# Patient Record
Sex: MALE | Race: BLACK OR AFRICAN AMERICAN | Employment: FULL TIME | URBAN - METROPOLITAN AREA
[De-identification: names, ages, dates, MRNs, and addresses within clinical notes are randomized per-mention and may not be internally consistent; named-entity substitution may affect disease eponyms.]

---

## 2017-02-01 ENCOUNTER — TELEPHONE (OUTPATIENT)
Dept: INTERNAL MEDICINE CLINIC | Facility: CLINIC | Age: 62
End: 2017-02-01

## 2017-03-16 ENCOUNTER — TELEPHONE (OUTPATIENT)
Dept: INTERNAL MEDICINE CLINIC | Facility: CLINIC | Age: 62
End: 2017-03-16

## 2017-03-16 NOTE — TELEPHONE ENCOUNTER
If he is still a pt here as he states he needs to make a fasting appt soon.    If he does not , he will be DC from the practice

## 2017-05-25 ENCOUNTER — LAB ENCOUNTER (OUTPATIENT)
Dept: LAB | Age: 62
End: 2017-05-25
Attending: FAMILY MEDICINE
Payer: COMMERCIAL

## 2017-05-25 ENCOUNTER — OFFICE VISIT (OUTPATIENT)
Dept: INTERNAL MEDICINE CLINIC | Facility: CLINIC | Age: 62
End: 2017-05-25

## 2017-05-25 VITALS
DIASTOLIC BLOOD PRESSURE: 90 MMHG | OXYGEN SATURATION: 96 % | BODY MASS INDEX: 50.62 KG/M2 | HEART RATE: 69 BPM | SYSTOLIC BLOOD PRESSURE: 148 MMHG | HEIGHT: 66 IN | WEIGHT: 315 LBS | TEMPERATURE: 99 F

## 2017-05-25 DIAGNOSIS — Z00.00 LABORATORY EXAMINATION ORDERED AS PART OF A ROUTINE GENERAL MEDICAL EXAMINATION: ICD-10-CM

## 2017-05-25 DIAGNOSIS — Z23 NEED FOR PROPHYLACTIC VACCINATION WITH COMBINED DIPHTHERIA-TETANUS-PERTUSSIS (DTP) VACCINE: ICD-10-CM

## 2017-05-25 DIAGNOSIS — L91.8 MULTIPLE ACQUIRED SKIN TAGS: ICD-10-CM

## 2017-05-25 DIAGNOSIS — R23.4 SKIN TEXTURE CHANGES: ICD-10-CM

## 2017-05-25 DIAGNOSIS — Z12.11 ENCOUNTER FOR SCREENING COLONOSCOPY: ICD-10-CM

## 2017-05-25 DIAGNOSIS — Z00.00 ROUTINE GENERAL MEDICAL EXAMINATION AT A HEALTH CARE FACILITY: Primary | ICD-10-CM

## 2017-05-25 DIAGNOSIS — M10.9 GOUTY ARTHROPATHY: ICD-10-CM

## 2017-05-25 PROCEDURE — 85025 COMPLETE CBC W/AUTO DIFF WBC: CPT

## 2017-05-25 PROCEDURE — 80053 COMPREHEN METABOLIC PANEL: CPT

## 2017-05-25 PROCEDURE — 80061 LIPID PANEL: CPT

## 2017-05-25 PROCEDURE — 84443 ASSAY THYROID STIM HORMONE: CPT

## 2017-05-25 PROCEDURE — 82306 VITAMIN D 25 HYDROXY: CPT

## 2017-05-25 PROCEDURE — 99386 PREV VISIT NEW AGE 40-64: CPT | Performed by: FAMILY MEDICINE

## 2017-05-25 PROCEDURE — 90471 IMMUNIZATION ADMIN: CPT | Performed by: FAMILY MEDICINE

## 2017-05-25 PROCEDURE — 90715 TDAP VACCINE 7 YRS/> IM: CPT | Performed by: FAMILY MEDICINE

## 2017-05-25 PROCEDURE — 84153 ASSAY OF PSA TOTAL: CPT

## 2017-05-25 PROCEDURE — 36415 COLL VENOUS BLD VENIPUNCTURE: CPT

## 2017-05-25 PROCEDURE — 83036 HEMOGLOBIN GLYCOSYLATED A1C: CPT

## 2017-05-25 PROCEDURE — 99203 OFFICE O/P NEW LOW 30 MIN: CPT | Performed by: FAMILY MEDICINE

## 2017-05-25 RX ORDER — CARVEDILOL 25 MG/1
25 TABLET ORAL 2 TIMES DAILY WITH MEALS
Qty: 180 TABLET | Refills: 0 | Status: SHIPPED | OUTPATIENT
Start: 2017-05-25 | End: 2017-07-24

## 2017-05-25 RX ORDER — ALLOPURINOL 100 MG/1
100 TABLET ORAL 2 TIMES DAILY
Qty: 180 TABLET | Refills: 1 | Status: SHIPPED | OUTPATIENT
Start: 2017-05-25 | End: 2018-08-13

## 2017-05-25 RX ORDER — CARVEDILOL 25 MG/1
25 TABLET ORAL 2 TIMES DAILY WITH MEALS
Qty: 60 TABLET | Refills: 1 | Status: SHIPPED | OUTPATIENT
Start: 2017-05-25 | End: 2017-05-25 | Stop reason: CLARIF

## 2017-05-25 NOTE — PROGRESS NOTES
Sayra Nathan is a 63year old male who presents for a complete physical exam.   HPI:   Pt complains of almazan gain since having both his replaced in the past 3 years. Pt has been seeing a doctor [facility] by where he lives now but does not like him.  We 01/09/2012       Lab Results  Component Value Date   AST 36* 10/30/2013   AST 22 06/18/2013   AST 22 10/01/2012       Lab Results  Component Value Date   ALT 54* 10/30/2013   ALT 27 06/18/2013   ALT 30 10/01/2012     No results found for: PSA       Current headaches  PSYCHE: denies depression or anxiety  HEMATOLOGIC: denies hx of anemia  ENDOCRINE: denies thyroid history,+DM  ALL/ASTHMA: denies hx of allergy or asthma    EXAM:   /90 mmHg  Pulse 69  Temp(Src) 98.6 °F (37 °C) (Oral)  Ht 66\"  Wt 327 lb 1 examination at a health care facility  (primary encounter diagnosis)  Need for prophylactic vaccination with combined diphtheria-tetanus-pertussis (dtp) vaccine  Encounter for screening colonoscopy  Multiple acquired skin tags  Skin texture changes  Gouty

## 2017-06-07 ENCOUNTER — TELEPHONE (OUTPATIENT)
Dept: INTERNAL MEDICINE CLINIC | Facility: CLINIC | Age: 62
End: 2017-06-07

## 2017-06-07 DIAGNOSIS — E55.9 VITAMIN D DEFICIENCY: ICD-10-CM

## 2017-06-07 DIAGNOSIS — E13.8 DIABETES MELLITUS OF OTHER TYPE WITH COMPLICATION: ICD-10-CM

## 2017-06-07 DIAGNOSIS — R74.8 ABNORMAL LIVER ENZYMES: Primary | ICD-10-CM

## 2017-06-07 RX ORDER — ERGOCALCIFEROL 1.25 MG/1
50000 CAPSULE ORAL WEEKLY
Qty: 12 CAPSULE | Refills: 1 | Status: SHIPPED | OUTPATIENT
Start: 2017-06-07 | End: 2018-02-20 | Stop reason: ALTCHOICE

## 2017-06-07 NOTE — TELEPHONE ENCOUNTER
----- Message from Radha Smith NP sent at 5/25/2017  5:11 PM CDT -----  Glucose up 179, Hgba1c pending. Alk phos, AST and ALT up. Pt needs to watch the tylenol, motrin and alcohol in his diet. Loosing weight would also help this.  Recheck these labs in on

## 2017-06-08 ENCOUNTER — TELEPHONE (OUTPATIENT)
Dept: INTERNAL MEDICINE CLINIC | Facility: CLINIC | Age: 62
End: 2017-06-08

## 2017-06-08 RX ORDER — LANCETS 33 GAUGE
1 EACH MISCELLANEOUS DAILY
Qty: 100 BOX | Refills: 1 | Status: SHIPPED | OUTPATIENT
Start: 2017-06-08 | End: 2018-06-08

## 2017-06-21 ENCOUNTER — OFFICE VISIT (OUTPATIENT)
Dept: INTERNAL MEDICINE CLINIC | Facility: CLINIC | Age: 62
End: 2017-06-21

## 2017-06-21 VITALS
DIASTOLIC BLOOD PRESSURE: 94 MMHG | OXYGEN SATURATION: 98 % | SYSTOLIC BLOOD PRESSURE: 150 MMHG | BODY MASS INDEX: 52 KG/M2 | WEIGHT: 315 LBS | HEART RATE: 65 BPM | TEMPERATURE: 99 F

## 2017-06-21 DIAGNOSIS — I10 ESSENTIAL HYPERTENSION: Primary | ICD-10-CM

## 2017-06-21 PROCEDURE — 99213 OFFICE O/P EST LOW 20 MIN: CPT | Performed by: FAMILY MEDICINE

## 2017-06-21 RX ORDER — VALSARTAN AND HYDROCHLOROTHIAZIDE 320; 12.5 MG/1; MG/1
1 TABLET, FILM COATED ORAL DAILY
Qty: 30 TABLET | Refills: 1 | Status: SHIPPED | OUTPATIENT
Start: 2017-06-21 | End: 2017-07-24

## 2017-06-21 NOTE — PROGRESS NOTES
CHIEF COMPLAINT:     Patient presents with:  Blood Pressure: Follow up. HPI:   Jamila Cruz is a 63year old male   Patient presents for recheck of his hypertension.  Pt has been taking medications as instructed, no medication side effects, home B Denies blurred vision or double vision  HENT: Denies congestion, rhinorrhea, sore throat or ear pain  CHEST: Denies chest pain, or palpitations  LUNGS: Denies shortness of breath, cough, or wheezing  NEURO: Denies headaches or lightheadedness      EXAM:

## 2017-07-12 ENCOUNTER — OFFICE VISIT (OUTPATIENT)
Dept: DERMATOLOGY CLINIC | Facility: CLINIC | Age: 62
End: 2017-07-12

## 2017-07-12 DIAGNOSIS — L98.9 PAINFUL SKIN LESION: ICD-10-CM

## 2017-07-12 DIAGNOSIS — L91.8 INFLAMED ACROCHORDON: ICD-10-CM

## 2017-07-12 DIAGNOSIS — D23.4 BENIGN NEOPLASM OF SCALP AND SKIN OF NECK: Primary | ICD-10-CM

## 2017-07-12 DIAGNOSIS — D23.9 BENIGN NEOPLASM OF SKIN, UNSPECIFIED LOCATION: ICD-10-CM

## 2017-07-12 PROCEDURE — 11200 RMVL SKIN TAGS UP TO&INC 15: CPT | Performed by: DERMATOLOGY

## 2017-07-12 PROCEDURE — 99201 OFFICE/OUTPT VISIT,NEW,LEVL I: CPT | Performed by: DERMATOLOGY

## 2017-07-12 RX ORDER — BLOOD-GLUCOSE METER
KIT MISCELLANEOUS
Refills: 0 | COMMUNITY
Start: 2017-06-09

## 2017-07-24 ENCOUNTER — OFFICE VISIT (OUTPATIENT)
Dept: INTERNAL MEDICINE CLINIC | Facility: CLINIC | Age: 62
End: 2017-07-24

## 2017-07-24 VITALS
WEIGHT: 315 LBS | OXYGEN SATURATION: 96 % | DIASTOLIC BLOOD PRESSURE: 78 MMHG | SYSTOLIC BLOOD PRESSURE: 138 MMHG | TEMPERATURE: 99 F | RESPIRATION RATE: 16 BRPM | HEART RATE: 70 BPM | BODY MASS INDEX: 51 KG/M2

## 2017-07-24 DIAGNOSIS — I10 ESSENTIAL HYPERTENSION: Primary | ICD-10-CM

## 2017-07-24 DIAGNOSIS — E11.9 DIABETES MELLITUS WITHOUT COMPLICATION (HCC): ICD-10-CM

## 2017-07-24 PROCEDURE — 99214 OFFICE O/P EST MOD 30 MIN: CPT | Performed by: FAMILY MEDICINE

## 2017-07-24 RX ORDER — CARVEDILOL 25 MG/1
25 TABLET ORAL 2 TIMES DAILY WITH MEALS
Qty: 180 TABLET | Refills: 0 | Status: SHIPPED | OUTPATIENT
Start: 2017-07-24 | End: 2018-02-20

## 2017-07-24 RX ORDER — VALSARTAN AND HYDROCHLOROTHIAZIDE 320; 12.5 MG/1; MG/1
1 TABLET, FILM COATED ORAL DAILY
Qty: 90 TABLET | Refills: 1 | Status: SHIPPED | OUTPATIENT
Start: 2017-07-24 | End: 2018-02-20

## 2017-07-24 NOTE — PROGRESS NOTES
CHIEF COMPLAINT:     Patient presents with:  HTN: Follow up      HPI:   Cristobal Samson is a 63year old male   Patient presents for recheck of his hypertension.  Pt has been taking medications as instructed, no medication side effects, home BP monitoring medical history on file. Social History:  Smoking status: Never Smoker                                                              Smokeless tobacco: Never Used                      Alcohol use:  No                 REVIEW OF SYSTEMS:   GENERAL: Denies fe pressure at home - < 135/85. Diabetes- DEC ordered  The patient indicates understanding of these issues and agrees to the plan. The patient is asked to return in 1-1.5 months.

## 2017-07-30 NOTE — PROGRESS NOTES
Pj Neil is a 63year old male. Patient presents with:  Lesion: New pt here for skin tags at neck. Review of patient's allergies indicates no known allergies.     Current Outpatient Prescriptions:  ONETOUCH DELICA LANCETS 07K Does 100 strip Rfl: 1   ergocalciferol 12152 units Oral Cap Take 1 capsule (50,000 Units total) by mouth once a week. Disp: 12 capsule Rfl: 1   MetFORMIN HCl 500 MG Oral Tab Take 1 tablet (500 mg total) by mouth 2 (two) times daily.  Disp: 60 tablet Rfl: 3   all lateral anterior neck. Caught on clothing painful. Increasing in number and size    Patient presents with concerns above. ROS:    Denies any other systemic complaints. No fevers, chills, night sweats, sensitivity to the sun, deeper lumps or bumps. unspecified location  Painful skin lesion    No orders of the defined types were placed in this encounter. Results From Past 48 Hours:  No results found for this or any previous visit (from the past 48 hour(s)).     Meds This Visit:      Imaging Orders

## 2017-09-05 ENCOUNTER — OFFICE VISIT (OUTPATIENT)
Dept: INTERNAL MEDICINE CLINIC | Facility: CLINIC | Age: 62
End: 2017-09-05

## 2017-09-05 VITALS
TEMPERATURE: 98 F | DIASTOLIC BLOOD PRESSURE: 90 MMHG | OXYGEN SATURATION: 97 % | HEART RATE: 62 BPM | WEIGHT: 315 LBS | BODY MASS INDEX: 52 KG/M2 | SYSTOLIC BLOOD PRESSURE: 138 MMHG

## 2017-09-05 DIAGNOSIS — I10 ESSENTIAL HYPERTENSION: Primary | ICD-10-CM

## 2017-09-05 DIAGNOSIS — M10.9 GOUTY ARTHROPATHY: ICD-10-CM

## 2017-09-05 PROCEDURE — 99214 OFFICE O/P EST MOD 30 MIN: CPT | Performed by: FAMILY MEDICINE

## 2017-09-05 RX ORDER — INDOMETHACIN 75 MG/1
75 CAPSULE, EXTENDED RELEASE ORAL 2 TIMES DAILY WITH MEALS
Qty: 180 CAPSULE | Refills: 1 | Status: SHIPPED | OUTPATIENT
Start: 2017-09-05 | End: 2018-02-20

## 2017-09-05 RX ORDER — COLCHICINE 0.6 MG/1
0.6 TABLET ORAL DAILY
Qty: 90 TABLET | Refills: 1 | Status: SHIPPED | OUTPATIENT
Start: 2017-09-05 | End: 2018-02-20

## 2017-09-05 NOTE — PROGRESS NOTES
CHIEF COMPLAINT:     Patient presents with:  Blood Pressure: Follow up. HPI:   Mason Macdonald is a 63year old male   Patient presents for recheck of his hypertension.  Pt has been taking medications as instructed, no medication side effects, home B 1200 MG Oral Cap Take 1 tablet by mouth daily. Disp:  Rfl:    allopurinol 100 MG Oral Tab Take 1 tablet (100 mg total) by mouth 2 (two) times daily. Disp: 180 tablet Rfl: 1      History reviewed. No pertinent past medical history.    Social History:  Smok meals.           Imaging & Consults:  None   1. Gouty arthropathy    - colchicine (COLCRYS) 0.6 MG Oral Tab; Take 1 tablet (0.6 mg total) by mouth daily. Dispense: 90 tablet; Refill: 1  - Indomethacin ER 75 MG Oral Cap CR;  Take 1 capsule (75 mg total) by

## 2017-09-06 ENCOUNTER — OFFICE VISIT (OUTPATIENT)
Dept: GASTROENTEROLOGY | Facility: CLINIC | Age: 62
End: 2017-09-06

## 2017-09-06 ENCOUNTER — TELEPHONE (OUTPATIENT)
Dept: GASTROENTEROLOGY | Facility: CLINIC | Age: 62
End: 2017-09-06

## 2017-09-06 VITALS
SYSTOLIC BLOOD PRESSURE: 152 MMHG | DIASTOLIC BLOOD PRESSURE: 102 MMHG | BODY MASS INDEX: 49.44 KG/M2 | HEIGHT: 67 IN | HEART RATE: 60 BPM | WEIGHT: 315 LBS

## 2017-09-06 DIAGNOSIS — Z12.11 SCREENING FOR COLON CANCER: Primary | ICD-10-CM

## 2017-09-06 DIAGNOSIS — Z12.11 COLON CANCER SCREENING: Primary | ICD-10-CM

## 2017-09-06 PROCEDURE — 99212 OFFICE O/P EST SF 10 MIN: CPT | Performed by: INTERNAL MEDICINE

## 2017-09-06 PROCEDURE — 99243 OFF/OP CNSLTJ NEW/EST LOW 30: CPT | Performed by: INTERNAL MEDICINE

## 2017-09-06 NOTE — TELEPHONE ENCOUNTER
Scheduled for:  Colon  Provider Name: KYLIE  Date:  11-9-17  Location:  [facility]  Sedation:  MAC  Time:  9:30am, arrival 8:30am  Prep: Colyte  Meds/Allergies Reconciled?:  yes  Diagnosis with codes:  Screening Z12.11  Was patient informed to call insurance with co

## 2017-09-06 NOTE — PROGRESS NOTES
Martha Lockett is a 63year old male. HPI:   Patient presents with:  Colonoscopy Screening  Abdominal Pain: belching    The patient is a 63-year-old male with a history of diabetes, who presents for colon cancer screening.   Patient reports he was sta Rfl: 1   carvedilol 25 MG Oral Tab Take 1 tablet (25 mg total) by mouth 2 (two) times daily with meals.  Disp: 180 tablet Rfl: 0   Blood Glucose Monitoring Suppl (FREESTYLE LITE) Does not apply Device U UTD Disp:  Rfl: 0   ONETOUCH DELICA LANCETS 63Q Does n discussed the screening modalities and he agrees to proceed with colonoscopy.   Risks/ benefits and alternatives were outlined including risk of perforation, bleeding, missed lesion and medication reaction.    - Coltye prep  - MAC sedation  - Adjust diabeti

## 2017-09-07 ENCOUNTER — NURSE ONLY (OUTPATIENT)
Dept: ENDOCRINOLOGY CLINIC | Facility: CLINIC | Age: 62
End: 2017-09-07

## 2017-09-07 VITALS
SYSTOLIC BLOOD PRESSURE: 176 MMHG | BODY MASS INDEX: 49.44 KG/M2 | WEIGHT: 315 LBS | DIASTOLIC BLOOD PRESSURE: 92 MMHG | HEIGHT: 67 IN | HEART RATE: 58 BPM

## 2017-09-07 DIAGNOSIS — E11.9 DIABETES MELLITUS WITHOUT COMPLICATION (HCC): Primary | ICD-10-CM

## 2017-09-07 PROCEDURE — G0108 DIAB MANAGE TRN  PER INDIV: HCPCS

## 2017-09-11 NOTE — PROGRESS NOTES
Jesika Tidwell  : 3/18/1955 attended Step 1 Diabetic Education:    Date: 2017       BP (!) 176/92   Pulse 58   Ht 67\"   Wt (!) 324 lb   BMI 50.75 kg/m²       HEMOGLOBIN A1c (% of total Hgb)   Date Value   10/30/2013 6.1 (H)   ----------  HgbA1C

## 2017-10-03 ENCOUNTER — OFFICE VISIT (OUTPATIENT)
Dept: INTERNAL MEDICINE CLINIC | Facility: CLINIC | Age: 62
End: 2017-10-03

## 2017-10-03 VITALS
HEART RATE: 76 BPM | SYSTOLIC BLOOD PRESSURE: 140 MMHG | DIASTOLIC BLOOD PRESSURE: 82 MMHG | RESPIRATION RATE: 18 BRPM | WEIGHT: 315 LBS | TEMPERATURE: 98 F | BODY MASS INDEX: 49.44 KG/M2 | HEIGHT: 67 IN

## 2017-10-03 DIAGNOSIS — I10 ESSENTIAL HYPERTENSION: Primary | ICD-10-CM

## 2017-10-03 PROCEDURE — 99213 OFFICE O/P EST LOW 20 MIN: CPT | Performed by: FAMILY MEDICINE

## 2017-10-03 NOTE — PROGRESS NOTES
CHIEF COMPLAINT:     Patient presents with:  HTN: Room 5. F/U on BP      HPI:   Rebecca Quinonez is a 63year old male   Patient presents for recheck of his hypertension.  Pt has been taking medications as instructed, no medication side effects, home BP mo VITAMIN E Take 1 Tab by mouth daily. Disp:  Rfl:    Omega-3 Fatty Acids (FISH OIL) 1200 MG Oral Cap Take 1 tablet by mouth daily.    Disp:  Rfl:       Past Medical History:   Diagnosis Date   • Biliary calculus 1985   • Diabetes mellitus ([facility] [address]    • Essent indicates understanding of these issues and agrees to the plan. The patient is asked to return in 3 months.

## 2017-10-31 ENCOUNTER — TELEPHONE (OUTPATIENT)
Dept: GASTROENTEROLOGY | Facility: CLINIC | Age: 62
End: 2017-10-31

## 2017-10-31 DIAGNOSIS — Z12.11 COLON CANCER SCREENING: Primary | ICD-10-CM

## 2017-10-31 NOTE — TELEPHONE ENCOUNTER
Rescheduled for:  [id number]  Provider Name:  Dr. Chaim Spicer   Date:    From-11/9/17  To-1/18/18  Location:  [facility]  Sedation:  MAC  Time:    From-0930  To-1015  Prep:  Dannielle, mailed new instructions 10/31/17  Meds/Allergies Reconciled?:  Physician reviewed

## 2017-10-31 NOTE — TELEPHONE ENCOUNTER
Pt called to reschedule CLN 11/9/17 due to transportation. Pt is aware of at least 72hr call back time.   Please call [phone] thank you

## 2017-11-14 DIAGNOSIS — R74.8 ABNORMAL LIVER ENZYMES: ICD-10-CM

## 2017-11-14 DIAGNOSIS — E55.9 VITAMIN D DEFICIENCY: ICD-10-CM

## 2017-12-14 DIAGNOSIS — E55.9 VITAMIN D DEFICIENCY: ICD-10-CM

## 2017-12-14 DIAGNOSIS — R74.8 ABNORMAL LIVER ENZYMES: ICD-10-CM

## 2017-12-18 ENCOUNTER — DIABETIC EDUCATION (OUTPATIENT)
Dept: ENDOCRINOLOGY CLINIC | Facility: CLINIC | Age: 62
End: 2017-12-18

## 2017-12-18 DIAGNOSIS — E11.65 TYPE 2 DIABETES MELLITUS WITH HYPERGLYCEMIA, WITHOUT LONG-TERM CURRENT USE OF INSULIN (HCC): Primary | ICD-10-CM

## 2017-12-18 PROCEDURE — G0108 DIAB MANAGE TRN  PER INDIV: HCPCS | Performed by: DIETITIAN, REGISTERED

## 2017-12-18 NOTE — TELEPHONE ENCOUNTER
From: Modesto Martinez  Sent: 12/14/2017 11:37 AM CST  Subject: Medication Renewal Request    Modesto Martinez would like a refill of the following medications:     MetFORMIN HCl 500 MG Oral Tab Ze Lagunas, TIANA]   Patient Comment: Pharmacy suggested that I try the Extended Release Version of the Metformin being I almost live in the bathroom, But we both agreed we should run that by You to see if You do approve of the switch. . Thank You. ..     Preferred pharmacy: [place] [address] [address], [facility] AT [address] [place] [address], [phone], [phone]

## 2017-12-18 NOTE — PROGRESS NOTES
Gianna SANTOS 3/18/1955 attended Step 2 Diabetic Education. Pt was seen individually as his work schedule conflicted with schedule for group classes.     Date: 2017  Start time: 10:00 End time: 12:00    His goal is to reduce 100# over the nex implementing healthy eating habits with portion control and attend Step 3 Class. Written materials provided for all areas covered. Patient verbalized understanding and has no further questions at this time.   Kecia Mcclellan MS, RD, CDE, LDN

## 2018-01-03 ENCOUNTER — TELEPHONE (OUTPATIENT)
Dept: GASTROENTEROLOGY | Facility: CLINIC | Age: 63
End: 2018-01-03

## 2018-01-03 DIAGNOSIS — Z12.11 COLON CANCER SCREENING: Primary | ICD-10-CM

## 2018-01-03 NOTE — TELEPHONE ENCOUNTER
CBLM to schedule procedure. Please transfer to Terry Mora at ext [phone] or [phone] for scheduling. Or please transfer to [facility] in GI if unavailable.

## 2018-01-03 NOTE — TELEPHONE ENCOUNTER
Forwarded to GI schedulers--pt contacted and is off week of March 26-30--please reschedule at this time, as it is the only week his son could be a . Will need to notify managed care of the change in date. Pt diabetic.  See 10/31 encounter for codieuli

## 2018-01-03 NOTE — TELEPHONE ENCOUNTER
Pt calling to reschedule CLN 1/18/18 due to class that needs to be taken per employer. Pt would prefer to have procedure at the end of March. Pt is aware of at least 72hr call back time.   Please call thank you [phone]

## 2018-01-11 ENCOUNTER — DIABETIC EDUCATION (OUTPATIENT)
Dept: ENDOCRINOLOGY CLINIC | Facility: CLINIC | Age: 63
End: 2018-01-11

## 2018-01-11 DIAGNOSIS — E11.65 TYPE 2 DIABETES MELLITUS WITH HYPERGLYCEMIA, WITHOUT LONG-TERM CURRENT USE OF INSULIN (HCC): Primary | ICD-10-CM

## 2018-01-11 PROCEDURE — G0109 DIAB MANAGE TRN IND/GROUP: HCPCS | Performed by: DIETITIAN, REGISTERED

## 2018-01-11 NOTE — PROGRESS NOTES
Jm Duff  [id number] attended Step 3 Diabetic Education:    Date: 1/11/2018  Start time: 9:00 End time: 11:00    Prevention, detection and treatment of chronic complications  Reviewed how to reduce risks of complications including eye, foot, den

## 2018-01-16 NOTE — TELEPHONE ENCOUNTER
CBLM to schedule procedure. Please transfer to Vasquez Camera at ext [phone] or [phone] for scheduling. Or please transfer to [facility] in GI if unavailable.

## 2018-02-12 ENCOUNTER — TELEPHONE (OUTPATIENT)
Dept: INTERNAL MEDICINE CLINIC | Facility: CLINIC | Age: 63
End: 2018-02-12

## 2018-02-12 NOTE — TELEPHONE ENCOUNTER
Called pt to inform overdue for labs. Pt stated has enough medication for a full month and will complete labs by the 1st of next month.

## 2018-02-20 ENCOUNTER — OFFICE VISIT (OUTPATIENT)
Dept: INTERNAL MEDICINE CLINIC | Facility: CLINIC | Age: 63
End: 2018-02-20

## 2018-02-20 VITALS
HEIGHT: 67 IN | HEART RATE: 74 BPM | WEIGHT: 315 LBS | DIASTOLIC BLOOD PRESSURE: 90 MMHG | TEMPERATURE: 99 F | RESPIRATION RATE: 16 BRPM | BODY MASS INDEX: 49.44 KG/M2 | SYSTOLIC BLOOD PRESSURE: 140 MMHG | OXYGEN SATURATION: 97 %

## 2018-02-20 DIAGNOSIS — I10 ESSENTIAL HYPERTENSION: ICD-10-CM

## 2018-02-20 DIAGNOSIS — M10.9 GOUTY ARTHROPATHY: ICD-10-CM

## 2018-02-20 DIAGNOSIS — Z13.21 SCREENING FOR ENDOCRINE, NUTRITIONAL, METABOLIC AND IMMUNITY DISORDER: ICD-10-CM

## 2018-02-20 DIAGNOSIS — Z13.228 SCREENING FOR ENDOCRINE, NUTRITIONAL, METABOLIC AND IMMUNITY DISORDER: ICD-10-CM

## 2018-02-20 DIAGNOSIS — E11.9 DIABETES MELLITUS WITHOUT COMPLICATION (HCC): Primary | ICD-10-CM

## 2018-02-20 DIAGNOSIS — Z13.29 SCREENING FOR ENDOCRINE, NUTRITIONAL, METABOLIC AND IMMUNITY DISORDER: ICD-10-CM

## 2018-02-20 DIAGNOSIS — Z13.0 SCREENING FOR ENDOCRINE, NUTRITIONAL, METABOLIC AND IMMUNITY DISORDER: ICD-10-CM

## 2018-02-20 PROCEDURE — 99214 OFFICE O/P EST MOD 30 MIN: CPT | Performed by: FAMILY MEDICINE

## 2018-02-20 RX ORDER — INDOMETHACIN 75 MG/1
75 CAPSULE, EXTENDED RELEASE ORAL 2 TIMES DAILY WITH MEALS
Qty: 180 CAPSULE | Refills: 0 | Status: SHIPPED | OUTPATIENT
Start: 2018-02-20 | End: 2018-07-19

## 2018-02-20 RX ORDER — VALSARTAN AND HYDROCHLOROTHIAZIDE 320; 12.5 MG/1; MG/1
1 TABLET, FILM COATED ORAL DAILY
Qty: 90 TABLET | Refills: 0 | Status: SHIPPED | OUTPATIENT
Start: 2018-02-20 | End: 2018-08-13

## 2018-02-20 RX ORDER — CARVEDILOL 25 MG/1
25 TABLET ORAL 2 TIMES DAILY WITH MEALS
Qty: 180 TABLET | Refills: 0 | Status: SHIPPED | OUTPATIENT
Start: 2018-02-20 | End: 2018-08-13

## 2018-02-20 RX ORDER — COLCHICINE 0.6 MG/1
0.6 TABLET ORAL DAILY
Qty: 90 TABLET | Refills: 0 | Status: SHIPPED | OUTPATIENT
Start: 2018-02-20 | End: 2018-07-19

## 2018-02-20 RX ORDER — METFORMIN HYDROCHLORIDE 500 MG/1
500 TABLET, EXTENDED RELEASE ORAL
Qty: 90 TABLET | Refills: 0 | Status: SHIPPED | OUTPATIENT
Start: 2018-02-20 | End: 2018-07-19

## 2018-02-20 NOTE — PROGRESS NOTES
HPI:   Milady Arredondo is a 63year old male who presents for recheck of his diabetes. Patient’s FBS have been - Pt not sure 's. Pt does not test daily. Pt over due for his labs- they were due in November 2017.   Last visit with ophthalmologist w 10/01/2012 22   ----------  ALT (U/L)   Date Value   10/30/2013 54 (H)   06/18/2013 27   10/01/2012 30   ----------  Alt (U/L)   Date Value   05/25/2017 88 (H)   ----------   No results found for: [facility]      Current Outpatient Prescriptions:  carv replacement   Social History: Smoking status: Never Smoker                                                              Smokeless tobacco: Never Used                      Alcohol use: No              Exercise: once per week.   Diet: watches fats closely and Visit:  Signed Prescriptions Disp Refills    carvedilol 25 MG Oral Tab 180 tablet 0      Sig: Take 1 tablet (25 mg total) by mouth 2 (two) times daily with meals.       Valsartan-Hydrochlorothiazide (DIOVAN HCT) 320-12.5 MG Oral Tab 90 tablet 0      Sig: Ta

## 2018-03-20 ENCOUNTER — HOSPITAL ENCOUNTER (OUTPATIENT)
Age: 63
Discharge: HOME | End: 2018-03-20
Attending: EMERGENCY MEDICINE
Payer: COMMERCIAL

## 2018-03-20 ENCOUNTER — LAB ENCOUNTER (OUTPATIENT)
Dept: LAB | Age: 63
End: 2018-03-20
Attending: FAMILY MEDICINE
Payer: COMMERCIAL

## 2018-03-20 VITALS
HEIGHT: 67 IN | RESPIRATION RATE: 18 BRPM | OXYGEN SATURATION: 96 % | SYSTOLIC BLOOD PRESSURE: 183 MMHG | BODY MASS INDEX: 48.65 KG/M2 | DIASTOLIC BLOOD PRESSURE: 109 MMHG | HEART RATE: 62 BPM | TEMPERATURE: 98 F | WEIGHT: 310 LBS

## 2018-03-20 DIAGNOSIS — E13.8 DIABETES MELLITUS OF OTHER TYPE WITH COMPLICATION: ICD-10-CM

## 2018-03-20 DIAGNOSIS — Z13.21 SCREENING FOR ENDOCRINE, NUTRITIONAL, METABOLIC AND IMMUNITY DISORDER: ICD-10-CM

## 2018-03-20 DIAGNOSIS — Z13.29 SCREENING FOR ENDOCRINE, NUTRITIONAL, METABOLIC AND IMMUNITY DISORDER: ICD-10-CM

## 2018-03-20 DIAGNOSIS — Z13.228 SCREENING FOR ENDOCRINE, NUTRITIONAL, METABOLIC AND IMMUNITY DISORDER: ICD-10-CM

## 2018-03-20 DIAGNOSIS — R74.8 ABNORMAL LIVER ENZYMES: ICD-10-CM

## 2018-03-20 DIAGNOSIS — S29.019A STRAIN OF THORACIC REGION, INITIAL ENCOUNTER: Primary | ICD-10-CM

## 2018-03-20 DIAGNOSIS — E55.9 VITAMIN D DEFICIENCY: ICD-10-CM

## 2018-03-20 DIAGNOSIS — Z13.0 SCREENING FOR ENDOCRINE, NUTRITIONAL, METABOLIC AND IMMUNITY DISORDER: ICD-10-CM

## 2018-03-20 LAB
ALP SERPL-CCNC: 83 U/L (ref 32–100)
ALT SERPL-CCNC: 33 U/L (ref 17–63)
AST SERPL-CCNC: 30 U/L (ref 15–41)
GLUCOSE SERPL-MCNC: 138 MG/DL (ref 70–99)

## 2018-03-20 PROCEDURE — 84403 ASSAY OF TOTAL TESTOSTERONE: CPT

## 2018-03-20 PROCEDURE — 36415 COLL VENOUS BLD VENIPUNCTURE: CPT

## 2018-03-20 PROCEDURE — 84402 ASSAY OF FREE TESTOSTERONE: CPT

## 2018-03-20 PROCEDURE — 99213 OFFICE O/P EST LOW 20 MIN: CPT

## 2018-03-20 PROCEDURE — 82947 ASSAY GLUCOSE BLOOD QUANT: CPT

## 2018-03-20 PROCEDURE — 99204 OFFICE O/P NEW MOD 45 MIN: CPT

## 2018-03-20 PROCEDURE — 83036 HEMOGLOBIN GLYCOSYLATED A1C: CPT

## 2018-03-20 PROCEDURE — 84460 ALANINE AMINO (ALT) (SGPT): CPT

## 2018-03-20 PROCEDURE — 84075 ASSAY ALKALINE PHOSPHATASE: CPT

## 2018-03-20 PROCEDURE — 84450 TRANSFERASE (AST) (SGOT): CPT

## 2018-03-20 RX ORDER — CYCLOBENZAPRINE HCL 10 MG
5 TABLET ORAL 3 TIMES DAILY PRN
Qty: 15 TABLET | Refills: 0 | Status: SHIPPED | OUTPATIENT
Start: 2018-03-20 | End: 2018-03-25

## 2018-03-20 NOTE — ED PROVIDER NOTES
Patient Seen in: [facility] Immediate Care In [address] [place] [address]    History   Patient presents with:  Back Pain (musculoskeletal)    Stated Complaint: Back Pain    HPI    The patient is a 19-year-old male with past history of hypertension, diabetes mellitus None (Room air)    Current:BP (!) 183/109   Pulse 62   Temp 97.9 °F (36.6 °C) (Oral)   Resp 18   Ht 170.2 cm (5' 7\")   Wt (!) 140.6 kg   SpO2 96%   BMI 48.55 kg/m²         Physical Exam    Constitutional: Well-developed obese male in no acute distress  He

## 2018-03-20 NOTE — ED INITIAL ASSESSMENT (HPI)
Middle and right sided Back pain since yesterday. Describes pain as achy. States he was playing with his grandkids on Sunday and felt the pain yesterday morning. Denies any urinary symptoms. Has been taking Indomethacin for the pain with relief.  Movement

## 2018-03-21 LAB — HBA1C MFR BLD: 7.3 % (ref 4–6)

## 2018-03-22 ENCOUNTER — TELEPHONE (OUTPATIENT)
Dept: INTERNAL MEDICINE CLINIC | Facility: CLINIC | Age: 63
End: 2018-03-22

## 2018-03-22 DIAGNOSIS — E11.9 DIABETES MELLITUS WITHOUT COMPLICATION (HCC): Primary | ICD-10-CM

## 2018-03-22 NOTE — TELEPHONE ENCOUNTER
Notes recorded by Cathleen Sterling NP on 3/21/2018 at 10:38 AM CDT  Hgba1c down some. Lets increase the Metformin ER to 1000 mg one daily #90.  Glucose and Hgba1c in 3 months

## 2018-03-22 NOTE — TELEPHONE ENCOUNTER
----- Message from Cheng River NP sent at 3/20/2018  6:36 PM CDT -----  Glucose improving,await Hgba1c. Pt to keep working on diet, and loosing weight. Rest of labs ok.  Others pending

## 2018-03-22 NOTE — TELEPHONE ENCOUNTER
Pt has not been taking metformin er 500 mg daily on the average taking 2 x week . Pt promised to take daily from now on so need to increase dose of metformin . Takes at night in case of loose stools. When do you want to repeat testing ?  Pt requests just sen

## 2018-03-23 LAB
TESTOSTERONE, FREE, S: 4.13 NG/DL
TESTOSTERONE, TOTAL, S: 258 NG/DL

## 2018-04-03 NOTE — TELEPHONE ENCOUNTER
CBLM to schedule procedure.  Please transfer to [facility] in GI     3 attempts, sending letter and closing this TE

## 2018-06-19 RX ORDER — BLOOD-GLUCOSE METER
KIT MISCELLANEOUS
Qty: 100 STRIP | Refills: 0 | Status: SHIPPED | OUTPATIENT
Start: 2018-06-19

## 2018-07-18 ENCOUNTER — TELEPHONE (OUTPATIENT)
Dept: INTERNAL MEDICINE CLINIC | Facility: CLINIC | Age: 63
End: 2018-07-18

## 2018-07-18 DIAGNOSIS — M10.9 GOUTY ARTHROPATHY: ICD-10-CM

## 2018-07-18 DIAGNOSIS — Z12.5 SCREENING PSA (PROSTATE SPECIFIC ANTIGEN): Primary | ICD-10-CM

## 2018-07-18 NOTE — TELEPHONE ENCOUNTER
Patient calling in requesting a refill for the following medication:     Indomethacin ER 75 MG Oral Cap CR  MetFORMIN HCl  MG Oral Tablet 24 Hr  colchicine (COLCRYS) 0.6 MG Oral Tab    To be sent to:  [address] -

## 2018-07-19 ENCOUNTER — TELEPHONE (OUTPATIENT)
Dept: INTERNAL MEDICINE CLINIC | Facility: CLINIC | Age: 63
End: 2018-07-19

## 2018-07-19 RX ORDER — COLCHICINE 0.6 MG/1
0.6 TABLET ORAL DAILY
Qty: 90 TABLET | Refills: 0 | Status: SHIPPED | OUTPATIENT
Start: 2018-07-19 | End: 2018-10-15

## 2018-07-19 RX ORDER — INDOMETHACIN 75 MG/1
75 CAPSULE, EXTENDED RELEASE ORAL 2 TIMES DAILY WITH MEALS
Qty: 180 CAPSULE | Refills: 0 | Status: SHIPPED | OUTPATIENT
Start: 2018-07-19 | End: 2019-02-09

## 2018-07-19 RX ORDER — METFORMIN HYDROCHLORIDE 500 MG/1
500 TABLET, EXTENDED RELEASE ORAL
Qty: 90 TABLET | Refills: 0 | Status: SHIPPED | OUTPATIENT
Start: 2018-07-19 | End: 2018-09-10 | Stop reason: DRUGHIGH

## 2018-07-19 NOTE — TELEPHONE ENCOUNTER
colchicine (COLCRYS) 0.6 MG Oral Tab  Indomethacin ER 75 MG Oral Cap CR    Needs refills on both and changed phamacy to [facility] [address]

## 2018-08-13 ENCOUNTER — APPOINTMENT (OUTPATIENT)
Dept: LAB | Age: 63
End: 2018-08-13
Attending: FAMILY MEDICINE
Payer: COMMERCIAL

## 2018-08-13 ENCOUNTER — OFFICE VISIT (OUTPATIENT)
Dept: INTERNAL MEDICINE CLINIC | Facility: CLINIC | Age: 63
End: 2018-08-13
Payer: COMMERCIAL

## 2018-08-13 VITALS
SYSTOLIC BLOOD PRESSURE: 176 MMHG | BODY MASS INDEX: 51 KG/M2 | HEART RATE: 63 BPM | OXYGEN SATURATION: 98 % | TEMPERATURE: 99 F | WEIGHT: 315 LBS | DIASTOLIC BLOOD PRESSURE: 102 MMHG

## 2018-08-13 DIAGNOSIS — E78.5 HYPERLIPIDEMIA, UNSPECIFIED HYPERLIPIDEMIA TYPE: ICD-10-CM

## 2018-08-13 DIAGNOSIS — E11.9 DIABETES MELLITUS WITHOUT COMPLICATION (HCC): Primary | ICD-10-CM

## 2018-08-13 DIAGNOSIS — E11.9 DIABETES MELLITUS WITHOUT COMPLICATION (HCC): ICD-10-CM

## 2018-08-13 DIAGNOSIS — I10 ESSENTIAL HYPERTENSION: ICD-10-CM

## 2018-08-13 DIAGNOSIS — Z12.5 SCREENING PSA (PROSTATE SPECIFIC ANTIGEN): ICD-10-CM

## 2018-08-13 LAB
CHOLEST SMN-MCNC: 156 MG/DL (ref ?–200)
CREAT UR-SCNC: 111 MG/DL
EST. AVERAGE GLUCOSE BLD GHB EST-MCNC: 160 MG/DL (ref 68–126)
GLUCOSE BLD-MCNC: 136 MG/DL (ref 70–99)
HBA1C MFR BLD HPLC: 7.2 % (ref ?–5.7)
HDLC SERPL-MCNC: 41 MG/DL (ref 40–59)
LDLC SERPL CALC-MCNC: 93 MG/DL (ref ?–100)
MICROALBUMIN UR-MCNC: 8.05 MG/DL
MICROALBUMIN/CREAT 24H UR-RTO: 72.5 UG/MG (ref ?–30)
NONHDLC SERPL-MCNC: 115 MG/DL (ref ?–130)
PSA SERPL-MCNC: 0.48 NG/ML (ref 0.01–4)
TRIGL SERPL-MCNC: 110 MG/DL (ref 30–149)
VLDLC SERPL CALC-MCNC: 22 MG/DL (ref 0–30)

## 2018-08-13 PROCEDURE — 83036 HEMOGLOBIN GLYCOSYLATED A1C: CPT | Performed by: FAMILY MEDICINE

## 2018-08-13 PROCEDURE — 36415 COLL VENOUS BLD VENIPUNCTURE: CPT | Performed by: FAMILY MEDICINE

## 2018-08-13 PROCEDURE — 82043 UR ALBUMIN QUANTITATIVE: CPT | Performed by: FAMILY MEDICINE

## 2018-08-13 PROCEDURE — 99214 OFFICE O/P EST MOD 30 MIN: CPT | Performed by: FAMILY MEDICINE

## 2018-08-13 PROCEDURE — 80061 LIPID PANEL: CPT | Performed by: FAMILY MEDICINE

## 2018-08-13 PROCEDURE — 82947 ASSAY GLUCOSE BLOOD QUANT: CPT | Performed by: FAMILY MEDICINE

## 2018-08-13 PROCEDURE — 84153 ASSAY OF PSA TOTAL: CPT | Performed by: FAMILY MEDICINE

## 2018-08-13 PROCEDURE — 82570 ASSAY OF URINE CREATININE: CPT | Performed by: FAMILY MEDICINE

## 2018-08-13 RX ORDER — BIOTIN 1 MG
1 TABLET ORAL EVERY OTHER DAY
COMMUNITY

## 2018-08-13 RX ORDER — CARVEDILOL 25 MG/1
25 TABLET ORAL 2 TIMES DAILY WITH MEALS
Qty: 180 TABLET | Refills: 0 | Status: SHIPPED | OUTPATIENT
Start: 2018-08-13 | End: 2018-12-14

## 2018-08-13 RX ORDER — IRBESARTAN AND HYDROCHLOROTHIAZIDE 300; 12.5 MG/1; MG/1
1 TABLET, FILM COATED ORAL DAILY
Qty: 30 TABLET | Refills: 1 | Status: SHIPPED | OUTPATIENT
Start: 2018-08-13 | End: 2018-10-14

## 2018-08-13 NOTE — PROGRESS NOTES
HPI:   Cristobal Samson is a 66year old male who presents for recheck of his diabetes. Patient’s FBS have been 109-130 when he checks it. Pt has been taking his Metformin at night when he remembers to take it. Pt works a lot of hours at [employer].   Last vi Cholesterol (mg/dL)   Date Value   05/25/2017 41 (L)   ----------  HDL CHOLESTEROL (mg/dL)   Date Value   06/18/2013 43   10/01/2012 41   01/09/2012 44   ----------  LDL-CHOLESTEROL (mg/dL (calc))   Date Value   06/18/2013 107   10/01/2012 111   01/09/2012 Diagnosis Date   • Biliary calculus 1985   • Diabetes mellitus ([facility] [address]    • Essential hypertension 1992   • Gout    • History of gallstones       Past Surgical History:  2014: HIP REPLACEMENT SURGERY      Comment: both  2014: OTHER SURGICAL HISTORY      Co who presents for a recheck of his   Essential hypertension  Diabetes mellitus without complication (hcc)  (primary encounter diagnosis)  Hyperlipidemia, unspecified hyperlipidemia type      Orders Placed This Encounter      Microalb/Creat Ratio, Random Margaret Tran

## 2018-08-17 ENCOUNTER — TELEPHONE (OUTPATIENT)
Dept: INTERNAL MEDICINE CLINIC | Facility: CLINIC | Age: 63
End: 2018-08-17

## 2018-08-17 DIAGNOSIS — E11.9 DIABETES MELLITUS WITHOUT COMPLICATION (HCC): Primary | ICD-10-CM

## 2018-08-17 NOTE — TELEPHONE ENCOUNTER
----- Message from Leda Robbins NP sent at 8/14/2018  5:58 PM CDT -----  Micro albumin up. Pt to take his Irbesartan daily to protect his kidneys form the DM. Hgba1c 7.2  Still up. Lets add jardiance 10 mg one daily.  Can give samples or call in for him fo

## 2018-08-21 ENCOUNTER — TELEPHONE (OUTPATIENT)
Dept: INTERNAL MEDICINE CLINIC | Facility: CLINIC | Age: 63
End: 2018-08-21

## 2018-08-21 NOTE — TELEPHONE ENCOUNTER
Pt would like to know if he can get samples for Empagliflozin (Appian) 10 MG and if so if he can have a call back

## 2018-08-22 ENCOUNTER — TELEPHONE (OUTPATIENT)
Dept: INTERNAL MEDICINE CLINIC | Facility: CLINIC | Age: 63
End: 2018-08-22

## 2018-08-22 NOTE — TELEPHONE ENCOUNTER
[facility] pharmacy called to follow up on prior authorization that was faxed for:  Empagliflozin (Reji Rota) 10 MG Oral Tab    [id number]- [place], [address] AT [facility], [phone], [phone]

## 2018-08-22 NOTE — TELEPHONE ENCOUNTER
Pt calling in inquiring about the sample request.   Pt was asking if that is what Sudheer Green would like him to take, if not, what other med does she recommend.      Please call pt @ : [phone]

## 2018-08-28 ENCOUNTER — TELEPHONE (OUTPATIENT)
Dept: INTERNAL MEDICINE CLINIC | Facility: CLINIC | Age: 63
End: 2018-08-28

## 2018-09-10 ENCOUNTER — OFFICE VISIT (OUTPATIENT)
Dept: INTERNAL MEDICINE CLINIC | Facility: CLINIC | Age: 63
End: 2018-09-10
Payer: COMMERCIAL

## 2018-09-10 VITALS
DIASTOLIC BLOOD PRESSURE: 86 MMHG | OXYGEN SATURATION: 98 % | WEIGHT: 315 LBS | TEMPERATURE: 98 F | SYSTOLIC BLOOD PRESSURE: 148 MMHG | BODY MASS INDEX: 50 KG/M2 | HEART RATE: 66 BPM

## 2018-09-10 DIAGNOSIS — I10 ESSENTIAL HYPERTENSION: Primary | ICD-10-CM

## 2018-09-10 PROCEDURE — 99213 OFFICE O/P EST LOW 20 MIN: CPT | Performed by: FAMILY MEDICINE

## 2018-09-10 RX ORDER — LORAZEPAM 0.5 MG
2 TABLET ORAL DAILY
COMMUNITY
End: 2020-07-14 | Stop reason: ALTCHOICE

## 2018-09-10 RX ORDER — AMLODIPINE BESYLATE 5 MG/1
5 TABLET ORAL DAILY
Qty: 90 TABLET | Refills: 0 | Status: SHIPPED | OUTPATIENT
Start: 2018-09-10 | End: 2018-12-08

## 2018-09-10 RX ORDER — METFORMIN HYDROCHLORIDE 1000 MG/1
1000 TABLET, FILM COATED, EXTENDED RELEASE ORAL
Qty: 90 TABLET | Refills: 0 | Status: SHIPPED | OUTPATIENT
Start: 2018-09-10 | End: 2018-09-18

## 2018-09-10 NOTE — PROGRESS NOTES
CHIEF COMPLAINT:     Patient presents with:  Blood Pressure: Follow up      HPI:   Natalie Vila is a 66year old male   Patient presents for recheck of his hypertension.  Pt has been taking medications as instructed, no medication side effects, home BP capsule (75 mg total) by mouth 2 (two) times daily with meals. Disp: 180 capsule Rfl: 0   colchicine (COLCRYS) 0.6 MG Oral Tab Take 1 tablet (0.6 mg total) by mouth daily.  Disp: 90 tablet Rfl: 0   FREESTYLE LITE TEST In Vitro Strip TEST DAILY AS DIRECTED D this encounter.       Meds & Refills for this Visit:  Requested Prescriptions     Signed Prescriptions Disp Refills   • MetFORMIN HCl ER, MOD, 1000 MG (MOD) Oral Tablet 24 Hr 90 tablet 0     Sig: Take 1 tablet (1,000 mg total) by mouth daily with breakfast.

## 2018-09-17 ENCOUNTER — TELEPHONE (OUTPATIENT)
Dept: INTERNAL MEDICINE CLINIC | Facility: CLINIC | Age: 63
End: 2018-09-17

## 2018-09-17 NOTE — TELEPHONE ENCOUNTER
Alternative requested: Patient's insurance pays for 2 tablets of Metformin ER 500mg daily only. Suggested alternatives: Metformin HCL ER 500mg OSM-TB, Metformin HCL ER 500mg tablet.

## 2018-09-17 NOTE — TELEPHONE ENCOUNTER
Pt needs to be on 1000 mg of the extended release so if Pt's insurance will cover 2 of the 500 mg tablets that is fine. #180,0 refills. Pt needs a Hgba1c in 3 months.

## 2018-09-18 RX ORDER — METFORMIN HYDROCHLORIDE 500 MG/1
1000 TABLET, EXTENDED RELEASE ORAL DAILY
Qty: 180 TABLET | Refills: 0 | Status: SHIPPED | OUTPATIENT
Start: 2018-09-18 | End: 2018-12-21

## 2018-10-14 DIAGNOSIS — I10 ESSENTIAL HYPERTENSION: ICD-10-CM

## 2018-10-15 DIAGNOSIS — M10.9 GOUTY ARTHROPATHY: ICD-10-CM

## 2018-10-16 RX ORDER — COLCHICINE 0.6 MG/1
TABLET ORAL
Qty: 30 TABLET | Refills: 0 | Status: SHIPPED | OUTPATIENT
Start: 2018-10-16 | End: 2018-10-25

## 2018-10-16 RX ORDER — IRBESARTAN AND HYDROCHLOROTHIAZIDE 300; 12.5 MG/1; MG/1
TABLET, FILM COATED ORAL
Qty: 30 TABLET | Refills: 0 | Status: SHIPPED | OUTPATIENT
Start: 2018-10-16 | End: 2018-10-25

## 2018-10-16 RX ORDER — METFORMIN HYDROCHLORIDE 500 MG/1
TABLET, EXTENDED RELEASE ORAL
Qty: 30 TABLET | Refills: 0 | Status: SHIPPED | OUTPATIENT
Start: 2018-10-16 | End: 2019-04-09

## 2018-10-17 NOTE — TELEPHONE ENCOUNTER
Your information has been submitted to [facility]. To check for an updated outcome later, reopen this PA request from your dashboard. If [facility] has not responded to your request within 24 hours, contact [facility] at [phone].  If you think there may

## 2018-10-25 ENCOUNTER — OFFICE VISIT (OUTPATIENT)
Dept: INTERNAL MEDICINE CLINIC | Facility: CLINIC | Age: 63
End: 2018-10-25
Payer: COMMERCIAL

## 2018-10-25 VITALS
SYSTOLIC BLOOD PRESSURE: 124 MMHG | HEIGHT: 67 IN | RESPIRATION RATE: 18 BRPM | WEIGHT: 315 LBS | HEART RATE: 65 BPM | OXYGEN SATURATION: 97 % | TEMPERATURE: 98 F | DIASTOLIC BLOOD PRESSURE: 76 MMHG | BODY MASS INDEX: 49.44 KG/M2

## 2018-10-25 DIAGNOSIS — Z23 NEED FOR PNEUMOCOCCAL VACCINATION: ICD-10-CM

## 2018-10-25 DIAGNOSIS — Z12.11 SCREEN FOR COLON CANCER: Primary | ICD-10-CM

## 2018-10-25 DIAGNOSIS — I10 ESSENTIAL HYPERTENSION: ICD-10-CM

## 2018-10-25 DIAGNOSIS — Z12.11 ENCOUNTER FOR SCREENING FECAL OCCULT BLOOD TESTING: ICD-10-CM

## 2018-10-25 DIAGNOSIS — M10.9 GOUTY ARTHROPATHY: ICD-10-CM

## 2018-10-25 PROCEDURE — 90471 IMMUNIZATION ADMIN: CPT | Performed by: FAMILY MEDICINE

## 2018-10-25 PROCEDURE — 90670 PCV13 VACCINE IM: CPT | Performed by: FAMILY MEDICINE

## 2018-10-25 PROCEDURE — 99396 PREV VISIT EST AGE 40-64: CPT | Performed by: FAMILY MEDICINE

## 2018-10-25 RX ORDER — IRBESARTAN AND HYDROCHLOROTHIAZIDE 300; 12.5 MG/1; MG/1
1 TABLET, FILM COATED ORAL
Qty: 90 TABLET | Refills: 1 | Status: SHIPPED | OUTPATIENT
Start: 2018-10-25 | End: 2019-02-23

## 2018-10-25 RX ORDER — COLCHICINE 0.6 MG/1
0.6 TABLET ORAL
Qty: 30 TABLET | Refills: 2 | Status: SHIPPED | OUTPATIENT
Start: 2018-10-25 | End: 2019-04-09

## 2018-10-25 NOTE — PROGRESS NOTES
Elisa Martinez is a 66year old male who presents for a complete physical exam.   HPI:   Pt doing well. Pt was in [place] last week to see his son and had some pork chops.  After that Pt's gout flared up a little and he took his colchicine and his tart c BY MOUTH EVERY DAY WITH BREAKFAST (Patient taking differently: TAKE 2 TABLET BY MOUTH EVERY DAY WITH BREAKFAST) Disp: 30 tablet Rfl: 0   MetFORMIN HCl  MG Oral Tablet 24 Hr Take 2 tablets (1,000 mg total) by mouth daily.  Disp: 180 tablet Rfl: 0   Mis Children: 3. Exercise: 2-3 times per week.   Diet: watches fats closely and watches sugar closely     REVIEW OF SYSTEMS:   GENERAL: feels well otherwise  SKIN: denies any unusual skin lesions  EYES:denies blurred vision or double vision  HEENT: denies leonidas are due to recheck his DM. Prevnar 13 today. Pt referred for screening colonoscopy. Medication refilled for Gout and HTN which both are stable. Pt states he has to find someone to drive him. Pt is going to his eye doctor today.  Pt info handouts given for:

## 2018-12-10 RX ORDER — AMLODIPINE BESYLATE 5 MG/1
TABLET ORAL
Qty: 90 TABLET | Refills: 0 | Status: SHIPPED | OUTPATIENT
Start: 2018-12-10 | End: 2019-02-23

## 2018-12-10 NOTE — TELEPHONE ENCOUNTER
Amlodipine last filled 9/18/18 #90    LOV 10/25/18 - CPX    RTC - 1 year    Last CMP ordered 5/25/17

## 2018-12-14 DIAGNOSIS — I10 ESSENTIAL HYPERTENSION: ICD-10-CM

## 2018-12-17 RX ORDER — CARVEDILOL 25 MG/1
25 TABLET ORAL 2 TIMES DAILY WITH MEALS
Qty: 180 TABLET | Refills: 0 | Status: SHIPPED | OUTPATIENT
Start: 2018-12-17 | End: 2019-02-23

## 2018-12-21 RX ORDER — METFORMIN HYDROCHLORIDE 500 MG/1
TABLET, EXTENDED RELEASE ORAL
Qty: 180 TABLET | Refills: 0 | Status: SHIPPED | OUTPATIENT
Start: 2018-12-21 | End: 2019-10-10

## 2018-12-26 RX ORDER — METFORMIN HYDROCHLORIDE 500 MG/1
TABLET, EXTENDED RELEASE ORAL
Qty: 30 TABLET | Refills: 0 | OUTPATIENT
Start: 2018-12-26

## 2019-02-06 ENCOUNTER — HOSPITAL ENCOUNTER (OUTPATIENT)
Age: 64
Discharge: HOME | End: 2019-02-06
Attending: EMERGENCY MEDICINE
Payer: COMMERCIAL

## 2019-02-06 VITALS
HEART RATE: 77 BPM | SYSTOLIC BLOOD PRESSURE: 149 MMHG | BODY MASS INDEX: 47 KG/M2 | TEMPERATURE: 98 F | WEIGHT: 300 LBS | RESPIRATION RATE: 16 BRPM | OXYGEN SATURATION: 96 % | DIASTOLIC BLOOD PRESSURE: 93 MMHG

## 2019-02-06 DIAGNOSIS — S39.012A LUMBOSACRAL STRAIN, INITIAL ENCOUNTER: Primary | ICD-10-CM

## 2019-02-06 LAB
BILIRUB UR QL STRIP: NEGATIVE
CLARITY UR: CLEAR
COLOR UR: YELLOW
GLUCOSE UR STRIP-MCNC: NEGATIVE MG/DL
HGB UR QL STRIP: NEGATIVE
KETONES UR STRIP-MCNC: NEGATIVE MG/DL
LEUKOCYTE ESTERASE UR QL STRIP: NEGATIVE
NITRITE UR QL STRIP: NEGATIVE
PH UR STRIP: 6.5 [PH]
PROT UR STRIP-MCNC: NEGATIVE MG/DL
SP GR UR STRIP: 1.01
UROBILINOGEN UR STRIP-ACNC: <2 MG/DL

## 2019-02-06 PROCEDURE — 81003 URINALYSIS AUTO W/O SCOPE: CPT

## 2019-02-06 PROCEDURE — 99214 OFFICE O/P EST MOD 30 MIN: CPT

## 2019-02-06 PROCEDURE — 99213 OFFICE O/P EST LOW 20 MIN: CPT

## 2019-02-06 RX ORDER — METHOCARBAMOL 500 MG/1
500 TABLET, FILM COATED ORAL 2 TIMES DAILY
Qty: 10 TABLET | Refills: 0 | Status: SHIPPED | OUTPATIENT
Start: 2019-02-06 | End: 2019-04-09 | Stop reason: ALTCHOICE

## 2019-02-06 NOTE — ED PROVIDER NOTES
Patient Seen in: [facility]mediate Care In [facility]    History   Patient presents with:  Back Pain (musculoskeletal)    Stated Complaint: back pain    HPI    Chief complaint: Back pain    History of present illness:   The patient complains of back 690 mg by mouth daily. Cholecalciferol (VITAMIN D3) 1000 units Oral Cap,  Take 1 capsule by mouth every other day. Indomethacin ER 75 MG Oral Cap CR,  Take 1 capsule (75 mg total) by mouth 2 (two) times daily with meals.    FREESTYLE LITE TEST In Vitr lower extremities hips knees and ankle flexion and extension, dorsiflexion and plantarflexion of the foot preserved bilateral 5 out of 5 strength, sensation intact from sacral region throughout lower extremities down to the dorsal surface of the foot, 2+ D

## 2019-02-06 NOTE — ED INITIAL ASSESSMENT (HPI)
R lower back pain since yesterday. Pain is worse with movement. Pt said he had a hard time getting out of bed this morning because of the pain. States he fell asleep on top of a pile of laundry so states that maybe he \"slept funny. \"

## 2019-02-08 ENCOUNTER — APPOINTMENT (OUTPATIENT)
Dept: GENERAL RADIOLOGY | Facility: HOSPITAL | Age: 64
End: 2019-02-08
Attending: NURSE PRACTITIONER
Payer: COMMERCIAL

## 2019-02-08 ENCOUNTER — HOSPITAL ENCOUNTER (EMERGENCY)
Facility: HOSPITAL | Age: 64
Discharge: HOME | End: 2019-02-08
Payer: COMMERCIAL

## 2019-02-08 VITALS
RESPIRATION RATE: 19 BRPM | OXYGEN SATURATION: 99 % | WEIGHT: 300 LBS | SYSTOLIC BLOOD PRESSURE: 139 MMHG | BODY MASS INDEX: 48.21 KG/M2 | TEMPERATURE: 98 F | DIASTOLIC BLOOD PRESSURE: 82 MMHG | HEART RATE: 73 BPM | HEIGHT: 66 IN

## 2019-02-08 DIAGNOSIS — M54.9 MODERATE BACK PAIN: Primary | ICD-10-CM

## 2019-02-08 PROCEDURE — 99284 EMERGENCY DEPT VISIT MOD MDM: CPT

## 2019-02-08 PROCEDURE — 72100 X-RAY EXAM L-S SPINE 2/3 VWS: CPT | Performed by: NURSE PRACTITIONER

## 2019-02-08 PROCEDURE — 96372 THER/PROPH/DIAG INJ SC/IM: CPT

## 2019-02-08 RX ORDER — KETOROLAC TROMETHAMINE 30 MG/ML
60 INJECTION, SOLUTION INTRAMUSCULAR; INTRAVENOUS ONCE
Status: COMPLETED | OUTPATIENT
Start: 2019-02-08 | End: 2019-02-08

## 2019-02-08 RX ORDER — ORPHENADRINE CITRATE 100 MG/1
100 TABLET, EXTENDED RELEASE ORAL 2 TIMES DAILY
Qty: 10 TABLET | Refills: 0 | Status: SHIPPED | OUTPATIENT
Start: 2019-02-08 | End: 2019-02-13

## 2019-02-08 RX ORDER — IBUPROFEN 600 MG/1
600 TABLET ORAL EVERY 8 HOURS PRN
Qty: 30 TABLET | Refills: 0 | Status: SHIPPED | OUTPATIENT
Start: 2019-02-08 | End: 2019-02-09

## 2019-02-08 RX ORDER — ORPHENADRINE CITRATE 30 MG/ML
30 INJECTION INTRAMUSCULAR; INTRAVENOUS EVERY 12 HOURS
Status: DISCONTINUED | OUTPATIENT
Start: 2019-02-08 | End: 2019-02-08

## 2019-02-08 NOTE — ED INITIAL ASSESSMENT (HPI)
Pt came in for continued back pain, reports muscle relaxer not helping. Reports \"stumbling down the stairs\" on Sunday. Denies fall, reports he caught himself. RR even and nonlabored, speaking in full sentences.

## 2019-02-08 NOTE — ED NOTES
PT safe to DC home per MD. Lupe Plummer to dress self. DC teaching done, pt verbalizes understanding. Ari gomez.

## 2019-02-08 NOTE — ED PROVIDER NOTES
Patient Seen in: [facility] Emergency Department    History   Patient presents with:  Back Pain (musculoskeletal)    Stated Complaint: Back Pain s/p Fall    HPI    Chief complaint: Back pain    History of present illness:   The patient complains of b METFORMIN HCL  MG Oral Tablet 24 Hr,  TAKE 1 TABLET BY MOUTH EVERY DAY WITH BREAKFAST  Patient taking differently: TAKE 2 TABLET BY MOUTH EVERY DAY WITH BREAKFAST   Misc Natural Products (TART CHERRY ADVANCED) Oral Cap,  Take 2 capsules by mouth charito motion, no midline bony tenderness  Abdomen: Soft nontender nondistended, no mass or prominent aortic pulsation  Back: No Tenderness to palpation in  The bilateral lumbar paraspinal region, no midline bony tenderness, no erythema, decreased range of motion

## 2019-02-09 ENCOUNTER — TELEPHONE (OUTPATIENT)
Dept: FAMILY MEDICINE CLINIC | Facility: CLINIC | Age: 64
End: 2019-02-09

## 2019-02-09 RX ORDER — KETOROLAC TROMETHAMINE 10 MG/1
10 TABLET, FILM COATED ORAL EVERY 6 HOURS PRN
Qty: 20 TABLET | Refills: 0 | Status: SHIPPED | OUTPATIENT
Start: 2019-02-09 | End: 2019-04-09 | Stop reason: ALTCHOICE

## 2019-02-09 NOTE — TELEPHONE ENCOUNTER
Patient called today at 10:30 am on the on call phone. Having persistent back pain after ED visit yesterday. Does not feel that ibuprofen is effective. Has appt with Dr. Yoon Spar office on Monday. Sent rx for ketorolac 10 mg PO q6h PRN.  Can take muscle rela

## 2019-02-11 ENCOUNTER — OFFICE VISIT (OUTPATIENT)
Dept: INTERNAL MEDICINE CLINIC | Facility: CLINIC | Age: 64
End: 2019-02-11
Payer: COMMERCIAL

## 2019-02-11 VITALS
OXYGEN SATURATION: 98 % | DIASTOLIC BLOOD PRESSURE: 86 MMHG | HEART RATE: 110 BPM | TEMPERATURE: 98 F | SYSTOLIC BLOOD PRESSURE: 142 MMHG | RESPIRATION RATE: 16 BRPM

## 2019-02-11 DIAGNOSIS — M25.552 BILATERAL HIP PAIN: ICD-10-CM

## 2019-02-11 DIAGNOSIS — S29.019A THORACIC MYOFASCIAL STRAIN, INITIAL ENCOUNTER: ICD-10-CM

## 2019-02-11 DIAGNOSIS — M25.551 BILATERAL HIP PAIN: ICD-10-CM

## 2019-02-11 DIAGNOSIS — M54.50 RIGHT-SIDED LOW BACK PAIN WITHOUT SCIATICA, UNSPECIFIED CHRONICITY: Primary | ICD-10-CM

## 2019-02-11 PROCEDURE — 96372 THER/PROPH/DIAG INJ SC/IM: CPT | Performed by: FAMILY MEDICINE

## 2019-02-11 PROCEDURE — 99214 OFFICE O/P EST MOD 30 MIN: CPT | Performed by: FAMILY MEDICINE

## 2019-02-11 RX ORDER — CYCLOBENZAPRINE HCL 10 MG
10 TABLET ORAL 3 TIMES DAILY PRN
Qty: 30 TABLET | Refills: 0 | Status: SHIPPED | OUTPATIENT
Start: 2019-02-11 | End: 2019-03-03

## 2019-02-11 RX ORDER — KETOROLAC TROMETHAMINE 30 MG/ML
60 INJECTION, SOLUTION INTRAMUSCULAR; INTRAVENOUS ONCE
Status: COMPLETED | OUTPATIENT
Start: 2019-02-11 | End: 2019-02-11

## 2019-02-11 RX ORDER — PREDNISONE 20 MG/1
TABLET ORAL
Qty: 10 TABLET | Refills: 0 | Status: SHIPPED | OUTPATIENT
Start: 2019-02-11 | End: 2019-02-23 | Stop reason: ALTCHOICE

## 2019-02-11 RX ADMIN — KETOROLAC TROMETHAMINE 60 MG: 30 INJECTION, SOLUTION INTRAMUSCULAR; INTRAVENOUS at 11:49:00

## 2019-02-11 NOTE — PROGRESS NOTES
CHIEF COMPLAINT:     Patient presents with:  Back Pain: started last sunday, Pt was going down the stairs and slipped.        HPI:   Terry Bello is a 66year old male who is here for complaints of lumbar pain but now his left knee and left foot are pa tablet Rfl: 0   Irbesartan-Hydrochlorothiazide 300-12.5 MG Oral Tab Take 1 tablet by mouth once daily. Disp: 90 tablet Rfl: 1   colchicine 0.6 MG Oral Tab Take 1 tablet (0.6 mg total) by mouth once daily.  Disp: 30 tablet Rfl: 2   METFORMIN HCL  MG Or 98.3 °F (36.8 °C) (Oral)   Resp 16   SpO2 98%    GENERAL: well developed, well nourished,in no apparent distress  SKIN: no rashes,no suspicious lesions  NECK: supple,no adenopathy,no bruits  LUNGS: clear to auscultation  CARDIO: RRR without murmur  GI: nor his Ortho who did his surgery. Pt does have an appt with a Back specialist tomorrow.

## 2019-02-13 ENCOUNTER — PATIENT MESSAGE (OUTPATIENT)
Dept: INTERNAL MEDICINE CLINIC | Facility: CLINIC | Age: 64
End: 2019-02-13

## 2019-02-13 ENCOUNTER — TELEPHONE (OUTPATIENT)
Dept: INTERNAL MEDICINE CLINIC | Facility: CLINIC | Age: 64
End: 2019-02-13

## 2019-02-13 DIAGNOSIS — S29.019A ACUTE THORACIC MYOFASCIAL STRAIN, INITIAL ENCOUNTER: ICD-10-CM

## 2019-02-13 DIAGNOSIS — M54.50 RIGHT-SIDED LOW BACK PAIN WITHOUT SCIATICA, UNSPECIFIED CHRONICITY: Primary | ICD-10-CM

## 2019-02-13 DIAGNOSIS — M25.551 BILATERAL HIP PAIN: ICD-10-CM

## 2019-02-13 DIAGNOSIS — M25.552 BILATERAL HIP PAIN: ICD-10-CM

## 2019-02-13 NOTE — TELEPHONE ENCOUNTER
Patient calling in, stated he will be faxing a form to be filled out by Camille, to return back to work.     Title of Form: Absence Certificate

## 2019-02-13 NOTE — TELEPHONE ENCOUNTER
From: Charanjit Lagunas  To: SHENA Rueda  Sent: 2/13/2019 2:53 PM CST  Subject: Referral Request    Good Chatman Jetty,  I did visit Dr. Tim Rogers at the [facility] in [facility] on Tuesday February 12, 2018 with My

## 2019-02-23 ENCOUNTER — OFFICE VISIT (OUTPATIENT)
Dept: INTERNAL MEDICINE CLINIC | Facility: CLINIC | Age: 64
End: 2019-02-23
Payer: COMMERCIAL

## 2019-02-23 VITALS
RESPIRATION RATE: 16 BRPM | DIASTOLIC BLOOD PRESSURE: 86 MMHG | SYSTOLIC BLOOD PRESSURE: 148 MMHG | HEART RATE: 98 BPM | TEMPERATURE: 98 F | OXYGEN SATURATION: 95 % | WEIGHT: 306 LBS | HEIGHT: 67 IN | BODY MASS INDEX: 48.03 KG/M2

## 2019-02-23 DIAGNOSIS — I49.9 IRREGULAR HEART BEAT: ICD-10-CM

## 2019-02-23 DIAGNOSIS — I10 ESSENTIAL HYPERTENSION: ICD-10-CM

## 2019-02-23 DIAGNOSIS — R10.9 RIGHT FLANK PAIN: Primary | ICD-10-CM

## 2019-02-23 LAB
APPEARANCE: CLEAR
BILIRUBIN: NEGATIVE
GLUCOSE (URINE DIPSTICK): NEGATIVE MG/DL
KETONES (URINE DIPSTICK): NEGATIVE MG/DL
LEUKOCYTES: NEGATIVE
MULTISTIX LOT#: NORMAL NUMERIC
NITRITE, URINE: NEGATIVE
PH, URINE: 6.5 (ref 4.5–8)
PROTEIN (URINE DIPSTICK): NEGATIVE MG/DL
SPECIFIC GRAVITY: 1.01 (ref 1–1.03)
UROBILINOGEN,SEMI-QN: 0.2 MG/DL (ref 0–1.9)

## 2019-02-23 PROCEDURE — 99214 OFFICE O/P EST MOD 30 MIN: CPT | Performed by: FAMILY MEDICINE

## 2019-02-23 PROCEDURE — 93000 ELECTROCARDIOGRAM COMPLETE: CPT | Performed by: FAMILY MEDICINE

## 2019-02-23 PROCEDURE — 81003 URINALYSIS AUTO W/O SCOPE: CPT | Performed by: FAMILY MEDICINE

## 2019-02-23 RX ORDER — CARVEDILOL 25 MG/1
25 TABLET ORAL 2 TIMES DAILY WITH MEALS
Qty: 180 TABLET | Refills: 0 | Status: SHIPPED | OUTPATIENT
Start: 2019-02-23 | End: 2019-07-24

## 2019-02-23 RX ORDER — AMLODIPINE BESYLATE 5 MG/1
5 TABLET ORAL
Qty: 90 TABLET | Refills: 0 | Status: SHIPPED | OUTPATIENT
Start: 2019-02-23 | End: 2019-07-24

## 2019-02-23 RX ORDER — IRBESARTAN AND HYDROCHLOROTHIAZIDE 300; 12.5 MG/1; MG/1
1 TABLET, FILM COATED ORAL
Qty: 90 TABLET | Refills: 0 | Status: SHIPPED | OUTPATIENT
Start: 2019-02-23 | End: 2019-10-10

## 2019-02-23 NOTE — PROGRESS NOTES
CHIEF COMPLAINT:   Patient presents with:  Abdomen/Flank Pain (GI/):  started the weekend when he was last seen. Lower flank pain, usually goes to bathroom 8-9 times daily just depends how much he drinks, Elevated hate rate.  No urine ordor, clear color 300-12.5 MG Oral Tab Take 1 tablet by mouth once daily. Disp: 90 tablet Rfl: 1   colchicine 0.6 MG Oral Tab Take 1 tablet (0.6 mg total) by mouth once daily.  Disp: 30 tablet Rfl: 2   METFORMIN HCL  MG Oral Tablet 24 Hr TAKE 1 TABLET BY MOUTH EVERY DA SpO2 95%   BMI 47.93 kg/m²   GENERAL: well developed, well nourished,in no apparent distress  CARDIO: RRR, no murmurs. EKG esst wnl  -EKG shows Normal sinus rhythm with rate of 90, IL and anterolateral [id number].    LUNGS: clear to auscu heart beat  - Pt to continue monitoring his rate. Pt to take all his BP medications daily.  - ELECTROCARDIOGRAM, COMPLETE    3. Essential hypertension  Conservative measures dicussed. Diet and exercise explained and encouraged. Fasting labs due.   Home bl

## 2019-02-25 ENCOUNTER — TELEPHONE (OUTPATIENT)
Dept: INTERNAL MEDICINE CLINIC | Facility: CLINIC | Age: 64
End: 2019-02-25

## 2019-02-25 NOTE — TELEPHONE ENCOUNTER
Sent medical release form for Most recent diabetic eye exam to Dr. Adeline Matson.  Made copy placed in to be scanned and placed original in tickler file on my desk

## 2019-03-01 ENCOUNTER — OFFICE VISIT (OUTPATIENT)
Dept: PHYSICAL THERAPY | Age: 64
End: 2019-03-01
Attending: FAMILY MEDICINE
Payer: COMMERCIAL

## 2019-03-01 DIAGNOSIS — M25.551 BILATERAL HIP PAIN: ICD-10-CM

## 2019-03-01 DIAGNOSIS — M54.50 RIGHT-SIDED LOW BACK PAIN WITHOUT SCIATICA, UNSPECIFIED CHRONICITY: ICD-10-CM

## 2019-03-01 DIAGNOSIS — M25.552 BILATERAL HIP PAIN: ICD-10-CM

## 2019-03-01 DIAGNOSIS — S29.019A ACUTE THORACIC MYOFASCIAL STRAIN, INITIAL ENCOUNTER: ICD-10-CM

## 2019-03-01 PROCEDURE — 97161 PT EVAL LOW COMPLEX 20 MIN: CPT

## 2019-03-01 PROCEDURE — 97110 THERAPEUTIC EXERCISES: CPT

## 2019-03-01 NOTE — PROGRESS NOTES
LUMBAR SPINE EVALUATION:   Referring Physician: Dr. Mcclain Side  Diagnosis: Right-sided low back pain without sciatica, unspecified chronicity (M54.5);  Acute thoracic myofascial strain, initial encounter (S29.019A)  Bilateral hip pain (M25.551,M25.552)     Da scapular stability, posture, body mechanics, bed mobility, and ergonomics necessary to return to PLOF, sitting and performing transitional motion without limitation    Rehab dx: thoracic muscle strain       Precautions: None     OBJECTIVE:   Observation/Po Re-education; Therapeutic Activity; Ultrasound;  Electrical Stim; Traction; Patient education: Home exercise program instruction    Education or treatment limitation: None  Rehab Potential:good  FOTO: 94/100 (predicted 95/100)    In agreement with FOTO scor

## 2019-03-20 ENCOUNTER — APPOINTMENT (OUTPATIENT)
Dept: PHYSICAL THERAPY | Age: 64
End: 2019-03-20
Attending: FAMILY MEDICINE
Payer: COMMERCIAL

## 2019-03-21 ENCOUNTER — APPOINTMENT (OUTPATIENT)
Dept: PHYSICAL THERAPY | Age: 64
End: 2019-03-21
Attending: FAMILY MEDICINE
Payer: COMMERCIAL

## 2019-03-26 ENCOUNTER — OFFICE VISIT (OUTPATIENT)
Dept: PHYSICAL THERAPY | Age: 64
End: 2019-03-26
Attending: FAMILY MEDICINE
Payer: COMMERCIAL

## 2019-03-26 PROCEDURE — 97110 THERAPEUTIC EXERCISES: CPT

## 2019-03-26 NOTE — PROGRESS NOTES
Diagnosis: Right-sided low back pain without sciatica, unspecified chronicity (M54.5);  Acute thoracic myofascial strain, initial encounter (S29.019A)  Bilateral hip pain (M25.551,M25.552)       Authorized # of Visits: 8 (POC)  Insurance: [facility] PPO

## 2019-03-28 ENCOUNTER — OFFICE VISIT (OUTPATIENT)
Dept: PHYSICAL THERAPY | Age: 64
End: 2019-03-28
Attending: FAMILY MEDICINE
Payer: COMMERCIAL

## 2019-03-28 PROCEDURE — 97110 THERAPEUTIC EXERCISES: CPT

## 2019-03-28 NOTE — PROGRESS NOTES
Diagnosis: Right-sided low back pain without sciatica, unspecified chronicity (M54.5);  Acute thoracic myofascial strain, initial encounter (S29.019A)  Bilateral hip pain (M25.551,M25.552)       Authorized # of Visits: 8 (POC)  Insurance: [facility] PPO 3  Total Timed Treatment:40 min       TotalTreatment Time: 42 min

## 2019-04-02 ENCOUNTER — OFFICE VISIT (OUTPATIENT)
Dept: PHYSICAL THERAPY | Age: 64
End: 2019-04-02
Attending: FAMILY MEDICINE
Payer: COMMERCIAL

## 2019-04-02 PROCEDURE — 97110 THERAPEUTIC EXERCISES: CPT

## 2019-04-02 NOTE — PROGRESS NOTES
Diagnosis: Right-sided low back pain without sciatica, unspecified chronicity (M54.5);  Acute thoracic myofascial strain, initial encounter (S29.019A)  Bilateral hip pain (M25.551,M25.552)       Authorized # of Visits: 8 (POC)  Insurance: [facility] PPO (bed mobility)     2) The patient will demonstrate > 4/5 MMT scapular strength necessary to maintain proper sitting posture > 2 hours      3) The patient will demonstrate normal pectoralis minor muscle length necessary for proper posture     4) The patient

## 2019-04-04 ENCOUNTER — OFFICE VISIT (OUTPATIENT)
Dept: PHYSICAL THERAPY | Age: 64
End: 2019-04-04
Attending: FAMILY MEDICINE
Payer: COMMERCIAL

## 2019-04-04 PROCEDURE — 97110 THERAPEUTIC EXERCISES: CPT

## 2019-04-04 NOTE — PROGRESS NOTES
Diagnosis: Right-sided low back pain without sciatica, unspecified chronicity (M54.5);  Acute thoracic myofascial strain, initial encounter (S29.019A)  Bilateral hip pain (M25.551,M25.552)       Authorized # of Visits: 8 (POC)  Insurance: [facility] PPO repetitions to improve functional strength.  Pt educated in BUE shoulder horizontal abduction with resistance band for HEP       Goals:  1) The patient will be safe and independent with a progressive HEP necessary to mange symptoms during ADLs (bed mobility

## 2019-04-09 ENCOUNTER — OFFICE VISIT (OUTPATIENT)
Dept: PHYSICAL THERAPY | Age: 64
End: 2019-04-09
Attending: FAMILY MEDICINE
Payer: COMMERCIAL

## 2019-04-09 ENCOUNTER — OFFICE VISIT (OUTPATIENT)
Dept: INTERNAL MEDICINE CLINIC | Facility: CLINIC | Age: 64
End: 2019-04-09
Payer: COMMERCIAL

## 2019-04-09 VITALS
SYSTOLIC BLOOD PRESSURE: 122 MMHG | RESPIRATION RATE: 14 BRPM | DIASTOLIC BLOOD PRESSURE: 88 MMHG | BODY MASS INDEX: 49.12 KG/M2 | TEMPERATURE: 98 F | WEIGHT: 313 LBS | OXYGEN SATURATION: 98 % | HEIGHT: 67 IN | HEART RATE: 67 BPM

## 2019-04-09 DIAGNOSIS — E11.9 DIABETES MELLITUS WITHOUT COMPLICATION (HCC): Primary | ICD-10-CM

## 2019-04-09 DIAGNOSIS — M10.9 GOUTY ARTHROPATHY: ICD-10-CM

## 2019-04-09 DIAGNOSIS — M25.562 ACUTE PAIN OF BOTH KNEES: ICD-10-CM

## 2019-04-09 DIAGNOSIS — M25.561 ACUTE PAIN OF BOTH KNEES: ICD-10-CM

## 2019-04-09 PROCEDURE — 99214 OFFICE O/P EST MOD 30 MIN: CPT | Performed by: FAMILY MEDICINE

## 2019-04-09 PROCEDURE — 83036 HEMOGLOBIN GLYCOSYLATED A1C: CPT | Performed by: FAMILY MEDICINE

## 2019-04-09 PROCEDURE — 97110 THERAPEUTIC EXERCISES: CPT

## 2019-04-09 RX ORDER — COLCHICINE 0.6 MG/1
0.6 TABLET ORAL
Qty: 30 TABLET | Refills: 2 | Status: SHIPPED | OUTPATIENT
Start: 2019-04-09 | End: 2019-06-27

## 2019-04-09 RX ORDER — METHYLPREDNISOLONE 4 MG/1
TABLET ORAL
Qty: 1 KIT | Refills: 0 | Status: SHIPPED | OUTPATIENT
Start: 2019-04-09 | End: 2019-07-24 | Stop reason: ALTCHOICE

## 2019-04-09 NOTE — PROGRESS NOTES
CHIEF COMPLAINT:     Patient presents with:  Knee Pain: both knees are bothering him. pt sleeps with knees bent and bends knees with he sits       HPI:   Rebecca Quinonez is a 60year old male . The patient complains of  bilateral knee pain.  Sx's started capsule by mouth every other day.    Disp:  Rfl:    FREESTYLE LITE TEST In Vitro Strip TEST DAILY AS DIRECTED Disp: 100 strip Rfl: 0   Blood Glucose Monitoring Suppl (FREESTYLE LITE) Does not apply Device U UTD Disp:  Rfl: 0   Multiple Vitamin (MULTIVITAMIN crepitus, locking or popping. + swelling on the inner aspect of the knee. No redness or warmth. Gait is steady, but slow. No palpable Baker's cyst..     Physical Exam    ASSESSMENT AND PLAN:     Gouty arthropathy  Diabetes mellitus without complication (h

## 2019-04-09 NOTE — PROGRESS NOTES
Diagnosis: Right-sided low back pain without sciatica, unspecified chronicity (M54.5);  Acute thoracic myofascial strain, initial encounter (S29.019A)  Bilateral hip pain (M25.551,M25.552)       Authorized # of Visits: 8 (POC)  Insurance: [facility] PPO push ups 2 x 15    SA reach with BTB R/L 20x    Doorway stretch 6 x 10 cts                         HEP:    HEP: HEP:  HEP: BUE horizontal abduction          Manual Therapy:  0 min        Assessment:   Pt able to tolerate increase in repetitions without res

## 2019-04-11 ENCOUNTER — OFFICE VISIT (OUTPATIENT)
Dept: PHYSICAL THERAPY | Age: 64
End: 2019-04-11
Attending: FAMILY MEDICINE
Payer: COMMERCIAL

## 2019-04-11 PROCEDURE — 97110 THERAPEUTIC EXERCISES: CPT

## 2019-04-11 NOTE — PROGRESS NOTES
DISCHARGE SUMMARY       Diagnosis: Right-sided low back pain without sciatica, unspecified chronicity (M54.5);  Acute thoracic myofascial strain, initial encounter (S29.019A)  Bilateral hip pain (M25.551,M25.552)       Authorized # of Visits: 8 (POC)  Insur F/B 5 min    Standing BUE ext BTB 20x  Standing narrow/high row BTB 20x    Standing SA reach BTB 20x  Standing low row with rot BTB 20x  Standing diagonals BTB  20x    (Review HEP)                          HEP: BUE horizontal abduction           Manual The

## 2019-05-20 DIAGNOSIS — I10 ESSENTIAL HYPERTENSION: ICD-10-CM

## 2019-05-20 RX ORDER — CARVEDILOL 25 MG/1
TABLET ORAL
Qty: 180 TABLET | Refills: 0 | OUTPATIENT
Start: 2019-05-20

## 2019-05-20 RX ORDER — AMLODIPINE BESYLATE 5 MG/1
TABLET ORAL
Qty: 90 TABLET | Refills: 0 | OUTPATIENT
Start: 2019-05-20

## 2019-05-20 NOTE — TELEPHONE ENCOUNTER
Pt states that he did not request medication refills nor does he need them.      Carvedilol failed protocol due to  Hypertension Medications Protocol Failed5/20 11:39 AM   CMP or BMP in past 12 months   Last OV relevant to medication: 10-25-18  Last refill

## 2019-06-27 DIAGNOSIS — M10.9 GOUTY ARTHROPATHY: ICD-10-CM

## 2019-06-27 RX ORDER — COLCHICINE 0.6 MG/1
0.6 TABLET ORAL
Qty: 90 TABLET | Refills: 0 | Status: SHIPPED | OUTPATIENT
Start: 2019-06-27 | End: 2019-10-10

## 2019-07-24 ENCOUNTER — OFFICE VISIT (OUTPATIENT)
Dept: INTERNAL MEDICINE CLINIC | Facility: CLINIC | Age: 64
End: 2019-07-24
Payer: COMMERCIAL

## 2019-07-24 VITALS
HEART RATE: 66 BPM | SYSTOLIC BLOOD PRESSURE: 132 MMHG | BODY MASS INDEX: 48.85 KG/M2 | DIASTOLIC BLOOD PRESSURE: 78 MMHG | OXYGEN SATURATION: 98 % | WEIGHT: 311.25 LBS | HEIGHT: 67 IN | RESPIRATION RATE: 16 BRPM | TEMPERATURE: 98 F

## 2019-07-24 DIAGNOSIS — E11.9 DIABETES MELLITUS WITHOUT COMPLICATION (HCC): ICD-10-CM

## 2019-07-24 DIAGNOSIS — S29.019A ACUTE THORACIC MYOFASCIAL STRAIN, INITIAL ENCOUNTER: ICD-10-CM

## 2019-07-24 DIAGNOSIS — M10.9 GOUTY ARTHROPATHY: Primary | ICD-10-CM

## 2019-07-24 DIAGNOSIS — I10 ESSENTIAL HYPERTENSION: ICD-10-CM

## 2019-07-24 PROCEDURE — 99214 OFFICE O/P EST MOD 30 MIN: CPT | Performed by: FAMILY MEDICINE

## 2019-07-24 RX ORDER — CYCLOBENZAPRINE HCL 10 MG
10 TABLET ORAL 3 TIMES DAILY
Qty: 30 TABLET | Refills: 1 | Status: SHIPPED | OUTPATIENT
Start: 2019-07-24 | End: 2019-08-13

## 2019-07-24 RX ORDER — AMLODIPINE BESYLATE 5 MG/1
5 TABLET ORAL
Qty: 90 TABLET | Refills: 0 | Status: SHIPPED | OUTPATIENT
Start: 2019-07-24 | End: 2019-10-10

## 2019-07-24 RX ORDER — CARVEDILOL 25 MG/1
25 TABLET ORAL 2 TIMES DAILY WITH MEALS
Qty: 180 TABLET | Refills: 0 | Status: SHIPPED | OUTPATIENT
Start: 2019-07-24 | End: 2019-10-10

## 2019-07-24 RX ORDER — INDOMETHACIN 50 MG/1
50 CAPSULE ORAL
Qty: 30 CAPSULE | Refills: 0 | Status: SHIPPED | OUTPATIENT
Start: 2019-07-24 | End: 2019-12-19 | Stop reason: DRUGHIGH

## 2019-07-24 RX ORDER — MELOXICAM 15 MG/1
15 TABLET ORAL DAILY
Qty: 30 TABLET | Refills: 1 | Status: SHIPPED | OUTPATIENT
Start: 2019-07-24 | End: 2020-09-23

## 2019-07-24 RX ORDER — METFORMIN HYDROCHLORIDE 500 MG/1
1000 TABLET, EXTENDED RELEASE ORAL
Qty: 180 TABLET | Refills: 0 | Status: CANCELLED | OUTPATIENT
Start: 2019-07-24

## 2019-07-24 NOTE — PROGRESS NOTES
CHIEF COMPLAINT:     Patient presents with:  Spasms: Pt has been having back spasms. Pt states last Wednesday, pt sleeps side ways in the middle of the bed. And pt got up and got a back spasms on the right side of back. Pt was using Flexeril.        HPI: times daily with meals. Take 1 tablet three times a day for three days when gout flares up. Disp: 30 capsule Rfl: 0   COLCHICINE 0.6 MG Oral Tab TAKE 1 TABLET (0.6 MG TOTAL) BY MOUTH ONCE DAILY.  Disp: 90 tablet Rfl: 0   Irbesartan-hydroCHLOROthiazide 300-1 lymphadenopathy  MUSCULOSKELETAL: Per HPI. No other joints are affected  NEURO: No numbness or tingling. No loss of bowel or bladder control. EXAM:   /78 (BP Location: Left arm, Patient Position: Sitting, Cuff Size: large)   Pulse 66   Temp 97. 8 Take 1 capsule (50 mg total) by mouth 3 (three) times daily with meals. Take 1 tablet three times a day for three days when gout flares up. Dispense: 30 capsule; Refill: 0    3.  Acute thoracic and Lumbar myofascial strain, initial encounter  --If have wor

## 2019-09-24 ENCOUNTER — OFFICE VISIT (OUTPATIENT)
Dept: INTERNAL MEDICINE CLINIC | Facility: CLINIC | Age: 64
End: 2019-09-24
Payer: COMMERCIAL

## 2019-09-24 VITALS
SYSTOLIC BLOOD PRESSURE: 134 MMHG | RESPIRATION RATE: 16 BRPM | OXYGEN SATURATION: 97 % | DIASTOLIC BLOOD PRESSURE: 82 MMHG | TEMPERATURE: 98 F | BODY MASS INDEX: 48.97 KG/M2 | HEART RATE: 68 BPM | WEIGHT: 312 LBS | HEIGHT: 67 IN

## 2019-09-24 DIAGNOSIS — E11.9 DIABETES MELLITUS WITHOUT COMPLICATION (HCC): ICD-10-CM

## 2019-09-24 DIAGNOSIS — I10 ESSENTIAL HYPERTENSION: Primary | ICD-10-CM

## 2019-09-24 DIAGNOSIS — J30.9 ALLERGIC RHINITIS, UNSPECIFIED SEASONALITY, UNSPECIFIED TRIGGER: ICD-10-CM

## 2019-09-24 DIAGNOSIS — K21.9 GASTROESOPHAGEAL REFLUX DISEASE, ESOPHAGITIS PRESENCE NOT SPECIFIED: ICD-10-CM

## 2019-09-24 PROCEDURE — 99214 OFFICE O/P EST MOD 30 MIN: CPT | Performed by: FAMILY MEDICINE

## 2019-09-24 RX ORDER — PANTOPRAZOLE SODIUM 40 MG/1
40 TABLET, DELAYED RELEASE ORAL
Qty: 30 TABLET | Refills: 1 | Status: SHIPPED | OUTPATIENT
Start: 2019-09-24 | End: 2019-09-30

## 2019-09-24 NOTE — PROGRESS NOTES
HPI:   Mason Macdonald is a 60year old male who presents for recheck of his diabetes. Patient’s FBS have been  118-130. States morning FBS around 130. Pt tests his blood 1-2 times daily. Last visit with ophthalmologist was 1 year.    Pt has been scottie Value   08/13/2018 7.2 (H)   05/25/2017 7.9 (H)     Glycohemoglobin (HgA1c) (%)   Date Value   03/20/2018 7.3 (H)     Cholesterol, Total (mg/dL)   Date Value   08/13/2018 156   05/25/2017 178     CHOLESTEROL, TOTAL (mg/dL)   Date Value   06/18/2013 175   1 tablet Rfl: 0   Misc Natural Products (TART CHERRY ADVANCED) Oral Cap Take 2 capsules by mouth daily. Disp:  Rfl:    Linolenic Acid (OMEGA 3 OR) Take 690 mg by mouth daily.  Disp:  Rfl:    Cholecalciferol (VITAMIN D3) 1000 units Oral Cap Take 1 capsule by m Position: Sitting, Cuff Size: large)   Pulse 68   Temp 97.9 °F (36.6 °C) (Oral)   Resp 16   Ht 67\"   Wt (!) 312 lb   SpO2 97%   BMI 48.87 kg/m²        GENERAL: well developed, well nourished,in no apparent distress, pleasant, no weight.   SKIN: no rashes,n The patient indicates understanding of these issues and agrees to the plan.   The patient is asked to return in 1 month for complete physical exam.

## 2019-09-29 ENCOUNTER — PATIENT MESSAGE (OUTPATIENT)
Dept: INTERNAL MEDICINE CLINIC | Facility: CLINIC | Age: 64
End: 2019-09-29

## 2019-09-30 RX ORDER — RANITIDINE 300 MG/1
300 TABLET ORAL 2 TIMES DAILY
Qty: 60 TABLET | Refills: 2 | Status: SHIPPED | OUTPATIENT
Start: 2019-09-30 | End: 2019-12-11

## 2019-09-30 NOTE — TELEPHONE ENCOUNTER
From: Luly Rowland  To: SHENA Reed  Sent: 9/29/2019 10:54 AM CDT  Subject: Prescription Question    Good Morning,    [facility] would not FILL THE PRESCRIPTION that You called in last Thursday saying My Insurance would not cover it.  Is there another

## 2019-09-30 NOTE — PROGRESS NOTES
Can you see what PPI the Pt's insurance will cover like generic Nexium 40 mg, Omeprazole 40 mg, prevacid 30 mg? If not he can use otc but he will need to take two of them a day.

## 2019-09-30 NOTE — PROGRESS NOTES
Approved but have qty limitations:   Nexium, omeprazole, and pantoprazole.     Covered:  Pepcid 20 & 40mg   ranitidine hcl tab 150 mg   ranitidine hcl tab 300 mg (Zantac)

## 2019-10-10 ENCOUNTER — OFFICE VISIT (OUTPATIENT)
Dept: INTERNAL MEDICINE CLINIC | Facility: CLINIC | Age: 64
End: 2019-10-10
Payer: COMMERCIAL

## 2019-10-10 ENCOUNTER — TELEPHONE (OUTPATIENT)
Dept: INTERNAL MEDICINE CLINIC | Facility: CLINIC | Age: 64
End: 2019-10-10

## 2019-10-10 VITALS
DIASTOLIC BLOOD PRESSURE: 86 MMHG | HEART RATE: 78 BPM | HEIGHT: 67 IN | OXYGEN SATURATION: 97 % | SYSTOLIC BLOOD PRESSURE: 130 MMHG | BODY MASS INDEX: 49.01 KG/M2 | RESPIRATION RATE: 18 BRPM | TEMPERATURE: 98 F | WEIGHT: 312.25 LBS

## 2019-10-10 DIAGNOSIS — G89.29 CHRONIC LOW BACK PAIN WITH RIGHT-SIDED SCIATICA, UNSPECIFIED BACK PAIN LATERALITY: ICD-10-CM

## 2019-10-10 DIAGNOSIS — M10.9 GOUTY ARTHROPATHY: ICD-10-CM

## 2019-10-10 DIAGNOSIS — I10 ESSENTIAL HYPERTENSION: Primary | ICD-10-CM

## 2019-10-10 DIAGNOSIS — M54.41 CHRONIC LOW BACK PAIN WITH RIGHT-SIDED SCIATICA, UNSPECIFIED BACK PAIN LATERALITY: ICD-10-CM

## 2019-10-10 PROCEDURE — 90686 IIV4 VACC NO PRSV 0.5 ML IM: CPT | Performed by: FAMILY MEDICINE

## 2019-10-10 PROCEDURE — 90471 IMMUNIZATION ADMIN: CPT | Performed by: FAMILY MEDICINE

## 2019-10-10 PROCEDURE — 90472 IMMUNIZATION ADMIN EACH ADD: CPT | Performed by: FAMILY MEDICINE

## 2019-10-10 PROCEDURE — 90732 PPSV23 VACC 2 YRS+ SUBQ/IM: CPT | Performed by: FAMILY MEDICINE

## 2019-10-10 PROCEDURE — 99214 OFFICE O/P EST MOD 30 MIN: CPT | Performed by: FAMILY MEDICINE

## 2019-10-10 RX ORDER — METFORMIN HYDROCHLORIDE 500 MG/1
1000 TABLET, EXTENDED RELEASE ORAL
Qty: 180 TABLET | Refills: 0 | Status: SHIPPED | OUTPATIENT
Start: 2019-10-10 | End: 2020-01-11

## 2019-10-10 RX ORDER — IRBESARTAN AND HYDROCHLOROTHIAZIDE 300; 12.5 MG/1; MG/1
1 TABLET, FILM COATED ORAL
Qty: 90 TABLET | Refills: 0 | Status: SHIPPED | OUTPATIENT
Start: 2019-10-10 | End: 2019-11-20 | Stop reason: RX

## 2019-10-10 RX ORDER — AMLODIPINE BESYLATE 5 MG/1
5 TABLET ORAL
Qty: 90 TABLET | Refills: 0 | Status: SHIPPED | OUTPATIENT
Start: 2019-10-10 | End: 2020-01-11

## 2019-10-10 RX ORDER — COLCHICINE 0.6 MG/1
0.6 TABLET ORAL
Qty: 90 TABLET | Refills: 0 | Status: SHIPPED | OUTPATIENT
Start: 2019-10-10 | End: 2020-04-14

## 2019-10-10 RX ORDER — CARVEDILOL 25 MG/1
25 TABLET ORAL 2 TIMES DAILY WITH MEALS
Qty: 180 TABLET | Refills: 0 | Status: SHIPPED | OUTPATIENT
Start: 2019-10-10 | End: 2020-01-11

## 2019-10-10 NOTE — PROGRESS NOTES
CHIEF COMPLAINT:     Patient presents with:  Musculoskeletal Problem: Pt states there were a couple times were he couldn't feel his leg, Pt thinks he was wearing belt to tight. Pt states he took off his belt and felt blood cirrculation flow back to legs.  P mg total) by mouth 2 (two) times daily. Disp: 60 tablet Rfl: 2   Meloxicam 15 MG Oral Tab Take 1 tablet (15 mg total) by mouth daily.  Disp: 30 tablet Rfl: 1   indomethacin 50 MG Oral Cap Take 1 capsule (50 mg total) by mouth 3 (three) times daily with meal Temp 98.1 °F (36.7 °C) (Oral)   Resp 18   Ht 67\"   Wt (!) 312 lb 4 oz (141.6 kg)   SpO2 97%   BMI 48.91 kg/m²        GENERAL: well developed, well nourished,in no apparent distress  SKIN: no rashes,no suspicious lesions  HEAD: atraumatic, normocephalic times daily with meals. • amLODIPine Besylate 5 MG Oral Tab 90 tablet 0     Sig: Take 1 tablet (5 mg total) by mouth once daily. Imaging & Consults:  FLULAVAL INFLUENZA VACCINE QUAD PRESERVATIVE FREE 0.5 ML  PNEUMOCOCCAL IMM (PNEUMOVAX)      1.  Ess

## 2019-10-10 NOTE — TELEPHONE ENCOUNTER
Leave papers have been faxed to [place] @ [phone] - copies have been made for scan and POD folder and originals have been put into outgoing mail as the pt requested. LVM for pt to contact office to relay message.

## 2019-11-05 ENCOUNTER — LAB ENCOUNTER (OUTPATIENT)
Dept: LAB | Age: 64
End: 2019-11-05
Attending: FAMILY MEDICINE
Payer: COMMERCIAL

## 2019-11-05 DIAGNOSIS — Z12.5 SPECIAL SCREENING FOR MALIGNANT NEOPLASM OF PROSTATE: ICD-10-CM

## 2019-11-05 DIAGNOSIS — Z00.00 LABORATORY EXAMINATION ORDERED AS PART OF A ROUTINE GENERAL MEDICAL EXAMINATION: ICD-10-CM

## 2019-11-05 DIAGNOSIS — I10 ESSENTIAL HYPERTENSION: ICD-10-CM

## 2019-11-05 DIAGNOSIS — E11.9 DIABETES MELLITUS WITHOUT COMPLICATION (HCC): ICD-10-CM

## 2019-11-05 PROCEDURE — 85025 COMPLETE CBC W/AUTO DIFF WBC: CPT

## 2019-11-05 PROCEDURE — 83036 HEMOGLOBIN GLYCOSYLATED A1C: CPT

## 2019-11-05 PROCEDURE — 80053 COMPREHEN METABOLIC PANEL: CPT

## 2019-11-05 PROCEDURE — 36415 COLL VENOUS BLD VENIPUNCTURE: CPT

## 2019-11-05 PROCEDURE — 82570 ASSAY OF URINE CREATININE: CPT

## 2019-11-05 PROCEDURE — 82043 UR ALBUMIN QUANTITATIVE: CPT

## 2019-11-05 PROCEDURE — 80061 LIPID PANEL: CPT

## 2019-11-05 PROCEDURE — 84443 ASSAY THYROID STIM HORMONE: CPT

## 2019-11-05 PROCEDURE — 84402 ASSAY OF FREE TESTOSTERONE: CPT

## 2019-11-05 PROCEDURE — 84403 ASSAY OF TOTAL TESTOSTERONE: CPT

## 2019-11-07 ENCOUNTER — OFFICE VISIT (OUTPATIENT)
Dept: INTERNAL MEDICINE CLINIC | Facility: CLINIC | Age: 64
End: 2019-11-07
Payer: COMMERCIAL

## 2019-11-07 VITALS
SYSTOLIC BLOOD PRESSURE: 130 MMHG | WEIGHT: 295.75 LBS | RESPIRATION RATE: 16 BRPM | TEMPERATURE: 98 F | DIASTOLIC BLOOD PRESSURE: 78 MMHG | HEIGHT: 67 IN | BODY MASS INDEX: 46.42 KG/M2 | HEART RATE: 70 BPM | OXYGEN SATURATION: 98 %

## 2019-11-07 DIAGNOSIS — R74.8 ELEVATED LIVER ENZYMES: ICD-10-CM

## 2019-11-07 DIAGNOSIS — K64.9 HEMORRHOIDS, UNSPECIFIED HEMORRHOID TYPE: ICD-10-CM

## 2019-11-07 DIAGNOSIS — Z00.00 ROUTINE GENERAL MEDICAL EXAMINATION AT A HEALTH CARE FACILITY: Primary | ICD-10-CM

## 2019-11-07 DIAGNOSIS — R79.9 ELEVATED BUN: ICD-10-CM

## 2019-11-07 PROCEDURE — 99396 PREV VISIT EST AGE 40-64: CPT | Performed by: FAMILY MEDICINE

## 2019-11-07 PROCEDURE — 99212 OFFICE O/P EST SF 10 MIN: CPT | Performed by: FAMILY MEDICINE

## 2019-11-07 NOTE — PROGRESS NOTES
Gianna Ramos is a 60year old male who presents for a complete physical exam.   HPI:   Pt complains of external hemorrhoid that is improving. Pt had some constipation, straining few weeks ago. Then the hemorrhoid started.  Pt has been using prep H and 50 (H) 05/25/2017     Lab Results   Component Value Date    ALT 76 (H) 11/05/2019    ALT 33 03/20/2018    ALT 88 (H) 05/25/2017     Lab Results   Component Value Date    PSA 0.48 08/13/2018    PSA 0.494 05/25/2017        Current Outpatient Medications   Me of gallstones       Past Surgical History:   Procedure Laterality Date   • HIP REPLACEMENT SURGERY  2014    both   • OTHER SURGICAL HISTORY  2014    bilateral hip replacement      Family History   Adopted: Yes   Problem Relation Age of Onset   • Heart Suzi Weinstein masses  MUSCULOSKELETAL: back is not tender,FROM of the back  EXTREMITIES: no cyanosis, clubbing or edema  NEURO: Oriented times three,cranial nerves are intact,motor and sensory are grossly intact    ASSESSMENT AND PLAN:   Jesika Tidwell is a 60 year o

## 2019-11-15 ENCOUNTER — TELEPHONE (OUTPATIENT)
Dept: INTERNAL MEDICINE CLINIC | Facility: CLINIC | Age: 64
End: 2019-11-15

## 2019-11-15 DIAGNOSIS — E78.5 HYPERLIPIDEMIA, UNSPECIFIED HYPERLIPIDEMIA TYPE: ICD-10-CM

## 2019-11-15 DIAGNOSIS — R74.8 ELEVATED LIVER ENZYMES: Primary | ICD-10-CM

## 2019-11-15 NOTE — TELEPHONE ENCOUNTER
Patient returning call from yesterday to discuss his lab results. Relayed pts results. Pt understood. No further questions.      Notes recorded by SHENA Forde on 11/14/2019 at 10:18 AM CST  Testosterone wnl

## 2019-11-20 RX ORDER — IRBESARTAN 300 MG/1
300 TABLET ORAL NIGHTLY
Qty: 90 TABLET | Refills: 0 | Status: SHIPPED | OUTPATIENT
Start: 2019-11-20 | End: 2019-11-21 | Stop reason: RX

## 2019-11-20 RX ORDER — HYDROCHLOROTHIAZIDE 12.5 MG/1
12.5 CAPSULE, GELATIN COATED ORAL DAILY
Qty: 90 CAPSULE | Refills: 0 | Status: SHIPPED | OUTPATIENT
Start: 2019-11-20 | End: 2020-02-17

## 2019-11-20 NOTE — TELEPHONE ENCOUNTER
Pt calling to let SM know that his irbesartan hctz is on backorder and he is out . Split meds ?  Send to pharmacy only callpt if changing medicatiobn

## 2019-11-20 NOTE — TELEPHONE ENCOUNTER
SHENA Hughes  [facility] Clinical Staff             Microalb improving. Pt to keep taking his BP meds and keeping his BP under control. Creatine up,and ratio down recheck BMP nonfasting and well hydrated. AST and ALT up.  Pt should  avoid NSAID, Jewels Lie

## 2019-11-21 RX ORDER — VALSARTAN 320 MG/1
320 TABLET ORAL DAILY
Qty: 90 TABLET | Refills: 0 | Status: SHIPPED | OUTPATIENT
Start: 2019-11-21 | End: 2020-02-17

## 2019-11-21 NOTE — TELEPHONE ENCOUNTER
[facility] calling in, stated now the RX Irbesartan 300 MG Oral Tab. Is on back order. Requesting an alternative prescription.

## 2019-12-11 RX ORDER — RANITIDINE 300 MG/1
300 TABLET ORAL 2 TIMES DAILY
Qty: 180 TABLET | Refills: 0 | Status: SHIPPED | OUTPATIENT
Start: 2019-12-11 | End: 2020-12-21 | Stop reason: ALTCHOICE

## 2019-12-11 NOTE — TELEPHONE ENCOUNTER
Ranitidine 300 mg 1 tab bid filled 9-30-19 60 with 2 refills     LOV 11-7-19   No upcoming apt on file   Labs 11-5-19

## 2019-12-19 DIAGNOSIS — M10.9 GOUTY ARTHROPATHY: ICD-10-CM

## 2019-12-19 RX ORDER — INDOMETHACIN 50 MG/1
CAPSULE ORAL
Qty: 30 CAPSULE | Refills: 0 | OUTPATIENT
Start: 2019-12-19

## 2019-12-19 NOTE — TELEPHONE ENCOUNTER
Indomethacin 50 MG  Last OV relevant to medication: 11-7-19  Last refill date: 7-24-19 #/refills: 0  When pt was asked to return for OV: CPX 1yr  Upcoming appt/reason: none  Recent labs: none

## 2020-01-10 DIAGNOSIS — I10 ESSENTIAL HYPERTENSION: ICD-10-CM

## 2020-01-10 NOTE — TELEPHONE ENCOUNTER
Last OV: 11/7/19 with Romina Rubio NP  Last refill date: 10/10/19     #/refills: #3-month supply, 0 refills  When pt was asked to return for OV: 1 year  Upcoming appt: 1/13/2020 with Romina Rubio NP  Last labs 11/5/19

## 2020-01-11 RX ORDER — CARVEDILOL 25 MG/1
TABLET ORAL
Qty: 180 TABLET | Refills: 0 | Status: SHIPPED | OUTPATIENT
Start: 2020-01-11 | End: 2020-09-23

## 2020-01-11 RX ORDER — AMLODIPINE BESYLATE 5 MG/1
TABLET ORAL
Qty: 90 TABLET | Refills: 0 | Status: SHIPPED | OUTPATIENT
Start: 2020-01-11 | End: 2020-09-23

## 2020-01-11 RX ORDER — METFORMIN HYDROCHLORIDE 500 MG/1
1000 TABLET, EXTENDED RELEASE ORAL
Qty: 180 TABLET | Refills: 0 | Status: SHIPPED | OUTPATIENT
Start: 2020-01-11 | End: 2020-04-14

## 2020-01-13 ENCOUNTER — OFFICE VISIT (OUTPATIENT)
Dept: INTERNAL MEDICINE CLINIC | Facility: CLINIC | Age: 65
End: 2020-01-13
Payer: COMMERCIAL

## 2020-01-13 VITALS
HEIGHT: 67 IN | HEART RATE: 66 BPM | DIASTOLIC BLOOD PRESSURE: 80 MMHG | TEMPERATURE: 98 F | OXYGEN SATURATION: 95 % | SYSTOLIC BLOOD PRESSURE: 128 MMHG | WEIGHT: 304.5 LBS | BODY MASS INDEX: 47.79 KG/M2 | RESPIRATION RATE: 18 BRPM

## 2020-01-13 DIAGNOSIS — J01.40 ACUTE PANSINUSITIS, RECURRENCE NOT SPECIFIED: Primary | ICD-10-CM

## 2020-01-13 PROCEDURE — 99213 OFFICE O/P EST LOW 20 MIN: CPT | Performed by: FAMILY MEDICINE

## 2020-01-13 RX ORDER — AZITHROMYCIN 250 MG/1
TABLET, FILM COATED ORAL
Qty: 6 TABLET | Refills: 0 | Status: SHIPPED | OUTPATIENT
Start: 2020-01-13 | End: 2020-06-08 | Stop reason: ALTCHOICE

## 2020-01-13 RX ORDER — CODEINE PHOSPHATE AND GUAIFENESIN 10; 100 MG/5ML; MG/5ML
5 SOLUTION ORAL EVERY 6 HOURS PRN
Qty: 120 ML | Refills: 0 | Status: SHIPPED | OUTPATIENT
Start: 2020-01-13 | End: 2020-07-14 | Stop reason: ALTCHOICE

## 2020-01-13 NOTE — PROGRESS NOTES
CHIEF COMPLAINT:   Patient presents with:  Cold: Pt states his congestion and wheezing is better. Denies any SOB and denies any chills. Pt states he had the flu and went to a cold. OTC allegra.  Pt states he had black phlegm last week but then changed to wh TEST In Vitro Strip TEST DAILY AS DIRECTED 100 strip 0   • Blood Glucose Monitoring Suppl (FREESTYLE LITE) Does not apply Device U UTD  0   • Multiple Vitamin (MULTIVITAMINS) Oral Tab Take 1 Tab by mouth daily.      • Zn-Pyg Afri-Nettle-Saw Palmet (SAW PALM murmur  LYMPH:  no lymphadenopathy. ASSESSMENT AND PLAN:   Jm Duff is a 60year old male who presents with   No diagnosis found. No orders of the defined types were placed in this encounter.       Meds & Refills for this Visit:  Requeste

## 2020-01-31 ENCOUNTER — HOSPITAL ENCOUNTER (OUTPATIENT)
Dept: CT IMAGING | Facility: HOSPITAL | Age: 65
Discharge: HOME | End: 2020-01-31
Attending: FAMILY MEDICINE

## 2020-01-31 DIAGNOSIS — Z13.9 ENCOUNTER FOR SCREENING: ICD-10-CM

## 2020-02-17 RX ORDER — VALSARTAN 320 MG/1
TABLET ORAL
Qty: 90 TABLET | Refills: 0 | Status: SHIPPED | OUTPATIENT
Start: 2020-02-17 | End: 2020-02-20 | Stop reason: RX

## 2020-02-17 RX ORDER — HYDROCHLOROTHIAZIDE 12.5 MG/1
CAPSULE, GELATIN COATED ORAL
Qty: 90 CAPSULE | Refills: 0 | Status: SHIPPED | OUTPATIENT
Start: 2020-02-17 | End: 2020-07-08

## 2020-02-17 NOTE — TELEPHONE ENCOUNTER
HYDROCHLOROTHIAZIDE 12.5 MG CP    Passed Protocol    Last OV relevant to medication: 11/7/2019  Last refill date: 11/20/2019     #/refills: #90 w/ 0 refills   When pt was asked to return for OV: 1 year   Upcoming appt/reason: no future appointments   Lab R

## 2020-02-18 ENCOUNTER — OFFICE VISIT (OUTPATIENT)
Dept: SURGERY | Facility: CLINIC | Age: 65
End: 2020-02-18
Payer: COMMERCIAL

## 2020-02-18 VITALS
HEIGHT: 67 IN | RESPIRATION RATE: 18 BRPM | SYSTOLIC BLOOD PRESSURE: 192 MMHG | DIASTOLIC BLOOD PRESSURE: 98 MMHG | WEIGHT: 297 LBS | HEART RATE: 79 BPM | BODY MASS INDEX: 46.62 KG/M2

## 2020-02-18 DIAGNOSIS — K80.10 CALCULUS OF GALLBLADDER WITH CHRONIC CHOLECYSTITIS WITHOUT OBSTRUCTION: ICD-10-CM

## 2020-02-18 DIAGNOSIS — K83.8 PNEUMOBILIA: Primary | ICD-10-CM

## 2020-02-18 PROCEDURE — 99243 OFF/OP CNSLTJ NEW/EST LOW 30: CPT | Performed by: SURGERY

## 2020-02-18 NOTE — H&P
New Patient Visit Note       Active Problems      1. Pneumobilia    2. Calculus of gallbladder with chronic cholecystitis without obstruction        Chief Complaint   Patient presents with:  Gallbladder: NW PT ref by PCP for gallbladder- CT done 1/31.  PT d • OTHER SURGICAL HISTORY  2014    bilateral hip replacement       The family history and social history have been reviewed by me today.     Family History   Adopted: Yes   Problem Relation Age of Onset   • Heart Attack Father    • Heart Attack Mother      Aron Matthew •  colchicine 0.6 MG Oral Tab, Take 1 tablet (0.6 mg total) by mouth once daily. , Disp: 90 tablet, Rfl: 0  •  Meloxicam 15 MG Oral Tab, Take 1 tablet (15 mg total) by mouth daily. , Disp: 30 tablet, Rfl: 1  •  Misc Natural Products (TART CHERRY ADVANCED) Or Neurological: He is alert and oriented to person, place, and time. Skin: Skin is warm and dry. Psychiatric: He has a normal mood and affect. His behavior is normal.   Nursing note and vitals reviewed.           Assessment   Pneumobilia  (primary encount

## 2020-02-20 ENCOUNTER — TELEPHONE (OUTPATIENT)
Dept: INTERNAL MEDICINE CLINIC | Facility: CLINIC | Age: 65
End: 2020-02-20

## 2020-02-20 RX ORDER — OLMESARTAN MEDOXOMIL 40 MG/1
40 TABLET ORAL DAILY
Qty: 90 TABLET | Refills: 0 | Status: SHIPPED | OUTPATIENT
Start: 2020-02-20 | End: 2020-07-08

## 2020-02-20 NOTE — TELEPHONE ENCOUNTER
Valsartan 320 Mg on backorder. Alternative needed. Suggested: Irbesartan 300 mg, Olmesartan 40 mg, candesartan 32 mg, telemisartan 80 mg, losartan 100 mg.

## 2020-02-20 NOTE — TELEPHONE ENCOUNTER
Called pt to let him know we are sending over [address] since Valsartan is in back order. He understood.

## 2020-06-08 ENCOUNTER — OFFICE VISIT (OUTPATIENT)
Dept: INTERNAL MEDICINE CLINIC | Facility: CLINIC | Age: 65
End: 2020-06-08
Payer: COMMERCIAL

## 2020-06-08 VITALS
HEART RATE: 72 BPM | HEIGHT: 67 IN | TEMPERATURE: 99 F | BODY MASS INDEX: 48.26 KG/M2 | SYSTOLIC BLOOD PRESSURE: 138 MMHG | DIASTOLIC BLOOD PRESSURE: 88 MMHG | WEIGHT: 307.5 LBS

## 2020-06-08 DIAGNOSIS — I10 ESSENTIAL HYPERTENSION: ICD-10-CM

## 2020-06-08 DIAGNOSIS — E11.9 DIABETES MELLITUS WITHOUT COMPLICATION (HCC): ICD-10-CM

## 2020-06-08 DIAGNOSIS — G47.00 INSOMNIA, UNSPECIFIED TYPE: Primary | ICD-10-CM

## 2020-06-08 PROCEDURE — 99214 OFFICE O/P EST MOD 30 MIN: CPT | Performed by: FAMILY MEDICINE

## 2020-06-08 RX ORDER — CYCLOBENZAPRINE HCL 10 MG
10 TABLET ORAL 3 TIMES DAILY
COMMUNITY
Start: 2020-05-18 | End: 2021-04-01 | Stop reason: ALTCHOICE

## 2020-06-15 ENCOUNTER — TELEPHONE (OUTPATIENT)
Dept: INTERNAL MEDICINE CLINIC | Facility: CLINIC | Age: 65
End: 2020-06-15

## 2020-06-15 ENCOUNTER — PATIENT MESSAGE (OUTPATIENT)
Dept: INTERNAL MEDICINE CLINIC | Facility: CLINIC | Age: 65
End: 2020-06-15

## 2020-06-15 DIAGNOSIS — M54.2 NECK PAIN: Primary | ICD-10-CM

## 2020-06-15 RX ORDER — CYCLOBENZAPRINE HCL 10 MG
10 TABLET ORAL 3 TIMES DAILY
Qty: 30 TABLET | Refills: 0 | Status: SHIPPED | OUTPATIENT
Start: 2020-06-15 | End: 2020-06-25

## 2020-06-15 NOTE — TELEPHONE ENCOUNTER
Incoming fax from [address]   With diabetic eye exam results   Placed in [facility] in basket

## 2020-06-15 NOTE — TELEPHONE ENCOUNTER
Pt can do some PT if he would like and I can give him a muscle relaxant to try Flexeril 10 mg one tid  prn #30.

## 2020-06-15 NOTE — TELEPHONE ENCOUNTER
From: Natalie Vila  To:  SHENA Gibbs  Sent: 6/15/2020 5:35 AM CDT  Subject: Other    Morning,  I have a Hypothetical Question for You This Morning, And I Hope I am right with the answer   After my appointment last Monday, I came home and tried t

## 2020-06-17 ENCOUNTER — OFFICE VISIT (OUTPATIENT)
Dept: PHYSICAL THERAPY | Age: 65
End: 2020-06-17
Attending: FAMILY MEDICINE
Payer: COMMERCIAL

## 2020-06-17 DIAGNOSIS — M54.2 NECK PAIN: ICD-10-CM

## 2020-06-17 PROCEDURE — 97162 PT EVAL MOD COMPLEX 30 MIN: CPT | Performed by: PHYSICAL THERAPIST

## 2020-06-17 PROCEDURE — 97110 THERAPEUTIC EXERCISES: CPT | Performed by: PHYSICAL THERAPIST

## 2020-06-17 NOTE — PROGRESS NOTES
SPINE EVALUATION:   Referring Physician: SHENA Mckeon  Diagnosis:   Neck pain (M54.2) Date of Service: 6/17/2020     PATIENT SUMMARY   Milady Arredondo \"MJ\"is a 63year old male who presents to therapy today with complaints of neck stiffness in t transfer sit - stand. Signs and symptoms are consistent with diagnosis of neck pain. Pt and PT discussed evaluation findings, pathology, POC and HEP. Pt voiced understanding and performs HEP correctly without reported pain.  Skilled Physical Therapy is me Moderate Complexity decision making due to 1-2 personal factors/comorbidities, 3 body structures involved/activity limitations, and evolving symptoms including changing pain levels. PLAN OF CARE:    Goals: (to be met in 8 visits)   1.  Patient to report in

## 2020-06-23 ENCOUNTER — TELEPHONE (OUTPATIENT)
Dept: INTERNAL MEDICINE CLINIC | Facility: CLINIC | Age: 65
End: 2020-06-23

## 2020-06-23 NOTE — TELEPHONE ENCOUNTER
[employer] calling for update on care and health maintenance for patient asked for a nurse call to discuss

## 2020-06-23 NOTE — TELEPHONE ENCOUNTER
Unable to give info by phone due to [place]. Pt's insurance may send request by witting via fax.

## 2020-06-24 ENCOUNTER — OFFICE VISIT (OUTPATIENT)
Dept: PHYSICAL THERAPY | Age: 65
End: 2020-06-24
Attending: FAMILY MEDICINE
Payer: COMMERCIAL

## 2020-06-24 PROCEDURE — 97140 MANUAL THERAPY 1/> REGIONS: CPT | Performed by: PHYSICAL THERAPIST

## 2020-06-24 PROCEDURE — 97110 THERAPEUTIC EXERCISES: CPT | Performed by: PHYSICAL THERAPIST

## 2020-06-24 NOTE — PROGRESS NOTES
Dx:   Neck pain (M54.2)      Insurance (Authorized # of Visits):  As needed     Authorizing Physician: SHENA Salazar  Next MD visit: none scheduled  Fall Risk: standard         Precautions: n/a             Subjective: C/o intermittent neck discomfort seated thoracic spine extension, seated cervical spine retraction, scapular retraction, MHP 10-15 min. Complete as well tolerated; stop if c/o.        Charges: 2 there ex, 1 manual       Total Timed Treatment: 45 min  Total Treatment Time: 45 min

## 2020-06-26 ENCOUNTER — OFFICE VISIT (OUTPATIENT)
Dept: PHYSICAL THERAPY | Age: 65
End: 2020-06-26
Attending: FAMILY MEDICINE
Payer: COMMERCIAL

## 2020-06-26 PROCEDURE — 97110 THERAPEUTIC EXERCISES: CPT | Performed by: PHYSICAL THERAPIST

## 2020-06-26 PROCEDURE — 97140 MANUAL THERAPY 1/> REGIONS: CPT | Performed by: PHYSICAL THERAPIST

## 2020-06-26 NOTE — PROGRESS NOTES
Dx:   Neck pain (M54.2)      Insurance (Authorized # of Visits):  As needed     Authorizing Physician: SHENA Salazar  Next MD visit: none scheduled  Fall Risk: standard         Precautions: n/a             Subjective:  Overslept; I'm late.  Slept reall sec  Thread the needle at wall x 10 reps each L/R  Row high/mid/low with black tubing x 15 reps   There Ex:   Seated thoracic spine ext/pec stretch 5 x 5 sec each  Thread the needle at wall x 10 reps each L/R  Row high/mid/low with black tubing x 15 reps

## 2020-07-01 ENCOUNTER — OFFICE VISIT (OUTPATIENT)
Dept: PHYSICAL THERAPY | Age: 65
End: 2020-07-01
Attending: FAMILY MEDICINE
Payer: COMMERCIAL

## 2020-07-01 PROCEDURE — 97140 MANUAL THERAPY 1/> REGIONS: CPT | Performed by: PHYSICAL THERAPIST

## 2020-07-01 PROCEDURE — 97110 THERAPEUTIC EXERCISES: CPT | Performed by: PHYSICAL THERAPIST

## 2020-07-01 NOTE — PROGRESS NOTES
Dx:   Neck pain (M54.2)      Insurance (Authorized # of Visits):  As needed     Authorizing Physician: SHENA Abbott  Next MD visit: none scheduled  Fall Risk: standard         Precautions: n/a             Subjective:  Neck feels better.  Slept awkward effort to reduce L hand symptoms  Seated thoracic spine ext/pec stretch 5 x 5 sec each  S/L spinal rotation with pec stretch 3 x 15 sec  Thread the needle at wall x 10 reps each L/R  Row high/mid/low with black tubing x 15 reps   There Ex:   Seated thoraci

## 2020-07-03 ENCOUNTER — OFFICE VISIT (OUTPATIENT)
Dept: PHYSICAL THERAPY | Age: 65
End: 2020-07-03
Attending: FAMILY MEDICINE
Payer: COMMERCIAL

## 2020-07-03 PROCEDURE — 97140 MANUAL THERAPY 1/> REGIONS: CPT | Performed by: PHYSICAL THERAPIST

## 2020-07-03 PROCEDURE — 97110 THERAPEUTIC EXERCISES: CPT | Performed by: PHYSICAL THERAPIST

## 2020-07-03 NOTE — PROGRESS NOTES
Dx:   Neck pain (M54.2)      Insurance (Authorized # of Visits):  As needed     Authorizing Physician: SHENA Akins  Next MD visit: none scheduled  Fall Risk: standard         Precautions: n/a             Subjective:  Magdaleno Reese asleep in chair last night, high/mid/low with black tubing x 15 reps   There Ex:   Seated thoracic spine ext/pec stretch 5 x 5 sec each  Thread the needle at wall x 10 reps each L/R  Row high/mid/low with black tubing x 15 reps There Ex:   Seated thoracic spine ext/pec stretch 5 x 5

## 2020-07-07 RX ORDER — METFORMIN HYDROCHLORIDE 500 MG/1
1000 TABLET, EXTENDED RELEASE ORAL
Qty: 180 TABLET | Refills: 0 | Status: SHIPPED | OUTPATIENT
Start: 2020-07-07 | End: 2020-10-05

## 2020-07-08 ENCOUNTER — OFFICE VISIT (OUTPATIENT)
Dept: PHYSICAL THERAPY | Age: 65
End: 2020-07-08
Attending: FAMILY MEDICINE
Payer: COMMERCIAL

## 2020-07-08 PROCEDURE — 97110 THERAPEUTIC EXERCISES: CPT | Performed by: PHYSICAL THERAPIST

## 2020-07-08 RX ORDER — OLMESARTAN MEDOXOMIL 40 MG/1
TABLET ORAL
Qty: 90 TABLET | Refills: 0 | Status: SHIPPED | OUTPATIENT
Start: 2020-07-08 | End: 2020-09-23 | Stop reason: DRUGHIGH

## 2020-07-08 RX ORDER — HYDROCHLOROTHIAZIDE 12.5 MG/1
CAPSULE, GELATIN COATED ORAL
Qty: 90 CAPSULE | Refills: 0 | Status: SHIPPED | OUTPATIENT
Start: 2020-07-08 | End: 2020-10-05

## 2020-07-08 NOTE — TELEPHONE ENCOUNTER
Olmesartan 40 mg  Last OV relevant to medication: 6-8-20  Last refill date: 2-20-20 #/refills: 0  When pt was asked to return for OV: 6 mo.   Upcoming appt/reason: none  Recent labs: 11-5-19: CMP    HCTZ 12.5 mg  Last OV relevant to medication: 6-8-20  Last

## 2020-07-08 NOTE — PROGRESS NOTES
Dx:   Neck pain (M54.2)      Insurance (Authorized # of Visits):  As needed     Authorizing Physician: SHENA Ventura  Next MD visit: none scheduled  Fall Risk: standard         Precautions: n/a           Subjective:  Neck 98% better since started PT. TX#: 3/8 Date:      7/1/2020 - Pt arrived 10 min late. TX#: 4/8 Date:  7/3/2020               TX#: 5/8 Date: 7/8/2020  Tx#: 6/8 - Patient arrived ~20 minutes late.     There Ex:   HEP review/practice: doorway pec stretch, seated thoracic 5 sec on: 15 sec off             HEP review/progression: doorway pec stretch, seated thoracic spine extension, seated cervical spine retraction, scapular retraction ( pt declined resistance band, reports has one at home), MHP 10-15 min.   May add self ce

## 2020-07-10 ENCOUNTER — APPOINTMENT (OUTPATIENT)
Dept: PHYSICAL THERAPY | Age: 65
End: 2020-07-10
Attending: FAMILY MEDICINE
Payer: COMMERCIAL

## 2020-07-14 ENCOUNTER — OFFICE VISIT (OUTPATIENT)
Dept: INTERNAL MEDICINE CLINIC | Facility: CLINIC | Age: 65
End: 2020-07-14
Payer: COMMERCIAL

## 2020-07-14 VITALS
SYSTOLIC BLOOD PRESSURE: 128 MMHG | HEIGHT: 67 IN | WEIGHT: 294.75 LBS | DIASTOLIC BLOOD PRESSURE: 78 MMHG | BODY MASS INDEX: 46.26 KG/M2 | TEMPERATURE: 98 F | RESPIRATION RATE: 20 BRPM | HEART RATE: 80 BPM

## 2020-07-14 DIAGNOSIS — R20.0 NUMBNESS AND TINGLING OF LEFT HAND: ICD-10-CM

## 2020-07-14 DIAGNOSIS — M25.532 LEFT WRIST PAIN: Primary | ICD-10-CM

## 2020-07-14 DIAGNOSIS — R20.2 NUMBNESS AND TINGLING OF LEFT HAND: ICD-10-CM

## 2020-07-14 PROCEDURE — 99213 OFFICE O/P EST LOW 20 MIN: CPT | Performed by: FAMILY MEDICINE

## 2020-07-14 RX ORDER — PREDNISONE 10 MG/1
TABLET ORAL
Qty: 20 TABLET | Refills: 0 | Status: SHIPPED | OUTPATIENT
Start: 2020-07-14 | End: 2020-09-22 | Stop reason: ALTCHOICE

## 2020-07-14 NOTE — PROGRESS NOTES
Patient presents with:  Hand Pain: L hand, pt states it goes numb. Pt pinky is the only finger that he has sensation. Every finger is numb. Wrist hurts. Pt is looking for a referral through [facility]. [facility] Advil.      HPI:     Sayra Nathan is a 63 year o 0   • Indomethacin ER 75 MG Oral Cap CR Take 1 capsule (75 mg total) by mouth 2 (two) times daily with meals. 60 capsule 1   • RANITIDINE  MG Oral Tab TAKE 1 TABLET (300 MG TOTAL) BY MOUTH 2 (TWO) TIMES DAILY.  180 tablet 0   • Cholecalciferol (VITAM Crepitus: none  - Ecchymosis/bruising: none  - Range of motion: FROM with mild pain  - Tenderness: + mild  - Joint laxity: none  - Tendons intact: +  - Peripheral pulses: intact  - Motor exam/strength/sensation: intact      ASSESSMENT AND PLAN:     Left wr

## 2020-07-20 ENCOUNTER — OFFICE VISIT (OUTPATIENT)
Dept: ORTHOPEDICS CLINIC | Facility: CLINIC | Age: 65
End: 2020-07-20
Payer: COMMERCIAL

## 2020-07-20 ENCOUNTER — HOSPITAL ENCOUNTER (OUTPATIENT)
Dept: GENERAL RADIOLOGY | Facility: HOSPITAL | Age: 65
Discharge: HOME | End: 2020-07-20
Attending: ORTHOPAEDIC SURGERY
Payer: COMMERCIAL

## 2020-07-20 VITALS — BODY MASS INDEX: 45.52 KG/M2 | HEIGHT: 67 IN | WEIGHT: 290 LBS

## 2020-07-20 DIAGNOSIS — R52 PAIN: ICD-10-CM

## 2020-07-20 DIAGNOSIS — G56.02 LEFT CARPAL TUNNEL SYNDROME: Primary | ICD-10-CM

## 2020-07-20 PROCEDURE — 99243 OFF/OP CNSLTJ NEW/EST LOW 30: CPT | Performed by: ORTHOPAEDIC SURGERY

## 2020-07-20 PROCEDURE — L3908 WHO COCK-UP NONMOLDE PRE OTS: HCPCS | Performed by: ORTHOPAEDIC SURGERY

## 2020-07-20 PROCEDURE — 3008F BODY MASS INDEX DOCD: CPT | Performed by: ORTHOPAEDIC SURGERY

## 2020-07-20 PROCEDURE — 73110 X-RAY EXAM OF WRIST: CPT | Performed by: ORTHOPAEDIC SURGERY

## 2020-07-20 NOTE — H&P
NURSING INTAKE COMMENTS: Patient presents with:  Consult: left hand pain- pt states pain started 3 wks. pt states he saw his pcp and she rx'd prednisone and it helped. pt has numbness in fingers.        HPI: This 63year old male presents today with complain Monitoring Suppl (FREESTYLE LITE) Does not apply Device U UTD  0   • Multiple Vitamin (MULTIVITAMINS) Oral Tab Take 1 Tab by mouth daily.      • OLMESARTAN MEDOXOMIL 40 MG Oral Tab TAKE 1 TABLET BY MOUTH EVERY DAY (Patient not taking: Reported on 7/20/2020) benign  Neurologic: Bilateral motor strength symmetric and intact grossly, sensation intact to light touch grossly  Musculoskeletal: extremity exam demonstrates some symmetric 1st dorsal interosseous atrophy bilaterally.   5 out of 5 opponens strength bilat

## 2020-07-22 ENCOUNTER — APPOINTMENT (OUTPATIENT)
Dept: PHYSICAL THERAPY | Age: 65
End: 2020-07-22
Attending: FAMILY MEDICINE
Payer: COMMERCIAL

## 2020-07-24 ENCOUNTER — APPOINTMENT (OUTPATIENT)
Dept: PHYSICAL THERAPY | Age: 65
End: 2020-07-24
Attending: FAMILY MEDICINE
Payer: COMMERCIAL

## 2020-09-22 ENCOUNTER — PATIENT MESSAGE (OUTPATIENT)
Dept: INTERNAL MEDICINE CLINIC | Facility: CLINIC | Age: 65
End: 2020-09-22

## 2020-09-22 ENCOUNTER — OFFICE VISIT (OUTPATIENT)
Dept: ORTHOPEDICS CLINIC | Facility: CLINIC | Age: 65
End: 2020-09-22
Payer: COMMERCIAL

## 2020-09-22 ENCOUNTER — HOSPITAL ENCOUNTER (OUTPATIENT)
Dept: GENERAL RADIOLOGY | Facility: HOSPITAL | Age: 65
Discharge: HOME | End: 2020-09-22
Attending: ORTHOPAEDIC SURGERY
Payer: COMMERCIAL

## 2020-09-22 ENCOUNTER — HOSPITAL ENCOUNTER (EMERGENCY)
Facility: HOSPITAL | Age: 65
Discharge: HOME | End: 2020-09-22
Attending: EMERGENCY MEDICINE
Payer: COMMERCIAL

## 2020-09-22 VITALS
HEIGHT: 66 IN | WEIGHT: 300 LBS | OXYGEN SATURATION: 98 % | SYSTOLIC BLOOD PRESSURE: 209 MMHG | HEART RATE: 95 BPM | TEMPERATURE: 98 F | RESPIRATION RATE: 18 BRPM | DIASTOLIC BLOOD PRESSURE: 107 MMHG | BODY MASS INDEX: 48.21 KG/M2

## 2020-09-22 VITALS — SYSTOLIC BLOOD PRESSURE: 192 MMHG | RESPIRATION RATE: 18 BRPM | DIASTOLIC BLOOD PRESSURE: 111 MMHG | HEART RATE: 90 BPM

## 2020-09-22 DIAGNOSIS — I10 UNCONTROLLED HYPERTENSION: Primary | ICD-10-CM

## 2020-09-22 DIAGNOSIS — M19.032 PRIMARY OSTEOARTHRITIS OF LEFT WRIST: Primary | ICD-10-CM

## 2020-09-22 DIAGNOSIS — R52 PAIN: ICD-10-CM

## 2020-09-22 DIAGNOSIS — M70.22 OLECRANON BURSITIS OF LEFT ELBOW: ICD-10-CM

## 2020-09-22 PROCEDURE — 3077F SYST BP >= 140 MM HG: CPT | Performed by: ORTHOPAEDIC SURGERY

## 2020-09-22 PROCEDURE — 73070 X-RAY EXAM OF ELBOW: CPT | Performed by: ORTHOPAEDIC SURGERY

## 2020-09-22 PROCEDURE — 3080F DIAST BP >= 90 MM HG: CPT | Performed by: ORTHOPAEDIC SURGERY

## 2020-09-22 PROCEDURE — 99214 OFFICE O/P EST MOD 30 MIN: CPT | Performed by: ORTHOPAEDIC SURGERY

## 2020-09-22 PROCEDURE — 99282 EMERGENCY DEPT VISIT SF MDM: CPT

## 2020-09-22 RX ORDER — TRIAMCINOLONE ACETONIDE 40 MG/ML
40 INJECTION, SUSPENSION INTRA-ARTICULAR; INTRAMUSCULAR ONCE
Status: DISCONTINUED | OUTPATIENT
Start: 2020-09-22 | End: 2020-09-22

## 2020-09-22 NOTE — PROGRESS NOTES
NURSING INTAKE COMMENTS: Patient presents with:  Elbow Pain: left- pt states pain started 5 days ago. pt denies any injury. HPI: This 63year old male presents today with complaints of left elbow pain.   This is a pleasant 53-year-old gentleman well-k EVERY DAY 90 tablet 0   • Indomethacin ER 75 MG Oral Cap CR Take 1 capsule (75 mg total) by mouth 2 (two) times daily with meals. 60 capsule 1   • Cholecalciferol (VITAMIN D3) 1000 units Oral Cap Take 1 capsule by mouth every other day.        • FREESTYLE L palpation of the distal triceps medial, lateral or anterior elbow. There is full elbow range of motion with minimal pain. Left wrist exam demonstrates tenderness to palpation over the radial styloid and radial carpal joint.   There is no ulnar tenderness pressure was noted to be significantly elevated after multiple checks greater than 180/100.   Therefore, I contacted the patient's primary care physician for further management of his hypertension also sent him to emergency department for further evaluation

## 2020-09-22 NOTE — TELEPHONE ENCOUNTER
From: Sayra Nathan  To: SHENA Garrison  Sent: 9/22/2020 1:49 PM CDT  Subject: Other    Walked to ER, figured what the [facility], here waiting now. .    Thanks

## 2020-09-23 ENCOUNTER — OFFICE VISIT (OUTPATIENT)
Dept: INTERNAL MEDICINE CLINIC | Facility: CLINIC | Age: 65
End: 2020-09-23
Payer: COMMERCIAL

## 2020-09-23 VITALS
SYSTOLIC BLOOD PRESSURE: 160 MMHG | HEART RATE: 96 BPM | RESPIRATION RATE: 22 BRPM | HEIGHT: 66 IN | WEIGHT: 300 LBS | DIASTOLIC BLOOD PRESSURE: 90 MMHG | TEMPERATURE: 98 F | BODY MASS INDEX: 48.21 KG/M2

## 2020-09-23 DIAGNOSIS — M70.22 OLECRANON BURSITIS OF LEFT ELBOW: Primary | ICD-10-CM

## 2020-09-23 DIAGNOSIS — M25.532 LEFT WRIST PAIN: ICD-10-CM

## 2020-09-23 DIAGNOSIS — I10 ESSENTIAL HYPERTENSION: ICD-10-CM

## 2020-09-23 PROCEDURE — 3077F SYST BP >= 140 MM HG: CPT | Performed by: FAMILY MEDICINE

## 2020-09-23 PROCEDURE — 3008F BODY MASS INDEX DOCD: CPT | Performed by: FAMILY MEDICINE

## 2020-09-23 PROCEDURE — 99214 OFFICE O/P EST MOD 30 MIN: CPT | Performed by: FAMILY MEDICINE

## 2020-09-23 PROCEDURE — 3080F DIAST BP >= 90 MM HG: CPT | Performed by: FAMILY MEDICINE

## 2020-09-23 RX ORDER — HYDROCODONE BITARTRATE AND ACETAMINOPHEN 5; 325 MG/1; MG/1
1 TABLET ORAL EVERY 6 HOURS PRN
Qty: 30 TABLET | Refills: 0 | Status: SHIPPED | OUTPATIENT
Start: 2020-09-23 | End: 2021-04-01 | Stop reason: ALTCHOICE

## 2020-09-23 RX ORDER — MELOXICAM 15 MG/1
15 TABLET ORAL DAILY
Qty: 30 TABLET | Refills: 1 | Status: SHIPPED | OUTPATIENT
Start: 2020-09-23 | End: 2020-12-21

## 2020-09-23 RX ORDER — OLMESARTAN MEDOXOMIL 20 MG/1
20 TABLET ORAL DAILY
Qty: 30 TABLET | Refills: 0 | Status: SHIPPED | OUTPATIENT
Start: 2020-09-23 | End: 2020-10-05

## 2020-09-23 RX ORDER — AMLODIPINE BESYLATE 5 MG/1
5 TABLET ORAL DAILY
Qty: 90 TABLET | Refills: 0 | Status: SHIPPED | OUTPATIENT
Start: 2020-09-23 | End: 2021-03-04

## 2020-09-23 RX ORDER — CARVEDILOL 25 MG/1
25 TABLET ORAL 2 TIMES DAILY WITH MEALS
Qty: 180 TABLET | Refills: 0 | Status: SHIPPED | OUTPATIENT
Start: 2020-09-23 | End: 2021-03-20

## 2020-09-23 NOTE — ED PROVIDER NOTES
Patient Seen in: [facility] AND [facility] Emergency Department    History   Patient presents with:  Hypertension      HPI    Patient presents to the ED after being sent by his doctor for hypertension.   He states that he has history of hypertension and his medic reviewed. Social History and Family History elements reviewed from today, pertinent positives to the presenting problem noted.     Physical Exam     ED Triage Vitals [09/22/20 1326]   BP (!) 199/108   Pulse 112   Resp 18   Temp 98 °F (36.7 °C)   Temp s 98%, Room air, Normal     Differential Diagnosis/ Diagnostic Considerations: Uncontrolled hypertension    Medical Record Review: I personally reviewed available prior medical records for any recent pertinent discharge summaries, testing, and procedures and

## 2020-09-23 NOTE — PROGRESS NOTES
CHIEF COMPLAINT:     Patient presents with:  Hypertension: Pt was in ER due to BP. Pt states Benicar is making him pee a lot and is keeping him up. at 4am today BP was at 147/94. HPI:   Bird Sewell is a 63year old male .   The patient presents HYDROcodone-acetaminophen (NORCO) 5-325 MG Oral Tab Take 1 tablet by mouth every 6 (six) hours as needed for Pain. 30 tablet 0   • Meloxicam 15 MG Oral Tab Take 1 tablet (15 mg total) by mouth daily.  30 tablet 1   • HYDROCHLOROTHIAZIDE 12.5 MG Oral Cap NOEL MUSCULOSKELETAL: see HPI  LYMPH:  Denies lymphadenopathy  NEURO: Denies headaches or lightheadedness      EXAM:   /90 (BP Location: Right arm, Patient Position: Sitting, Cuff Size: large)   Pulse 96   Temp 97.8 °F (36.6 °C) (Oral)   Resp 22   Ht 66 hypertension  - Benicar decreased to 20 mg daily. Pt to take all his medications daily! BP check in 1-2 weeks. Conservative measures dicussed. Diet and exercise explained and encouraged. Home blood pressure monitoring.  Pt should measure BP’s two to thr

## 2020-09-23 NOTE — TELEPHONE ENCOUNTER
From: Elisa Martinez  To: SHENA Sanderson  Sent: 9/22/2020 5:07 PM CDT  Subject: Other    At home now.  ER Doc felt it was the Change in Medication along with taking Advil and Aleive within a 12 hr period along with the Olmesartan-Medoxomil and Coffee

## 2020-09-28 ENCOUNTER — OFFICE VISIT (OUTPATIENT)
Dept: INTERNAL MEDICINE CLINIC | Facility: CLINIC | Age: 65
End: 2020-09-28
Payer: COMMERCIAL

## 2020-09-28 VITALS
TEMPERATURE: 98 F | DIASTOLIC BLOOD PRESSURE: 78 MMHG | SYSTOLIC BLOOD PRESSURE: 136 MMHG | HEART RATE: 84 BPM | HEIGHT: 66 IN | BODY MASS INDEX: 48.21 KG/M2 | RESPIRATION RATE: 18 BRPM | WEIGHT: 300 LBS

## 2020-09-28 DIAGNOSIS — Z12.5 SPECIAL SCREENING FOR MALIGNANT NEOPLASM OF PROSTATE: ICD-10-CM

## 2020-09-28 DIAGNOSIS — M10.9 GOUTY ARTHROPATHY: ICD-10-CM

## 2020-09-28 DIAGNOSIS — Z00.00 LABORATORY EXAMINATION ORDERED AS PART OF A ROUTINE GENERAL MEDICAL EXAMINATION: ICD-10-CM

## 2020-09-28 DIAGNOSIS — I10 ESSENTIAL HYPERTENSION: Primary | ICD-10-CM

## 2020-09-28 PROCEDURE — 3075F SYST BP GE 130 - 139MM HG: CPT | Performed by: FAMILY MEDICINE

## 2020-09-28 PROCEDURE — 99214 OFFICE O/P EST MOD 30 MIN: CPT | Performed by: FAMILY MEDICINE

## 2020-09-28 PROCEDURE — 3078F DIAST BP <80 MM HG: CPT | Performed by: FAMILY MEDICINE

## 2020-09-28 PROCEDURE — 3008F BODY MASS INDEX DOCD: CPT | Performed by: FAMILY MEDICINE

## 2020-09-28 RX ORDER — PREDNISONE 10 MG/1
TABLET ORAL
Qty: 20 TABLET | Refills: 0 | Status: SHIPPED | OUTPATIENT
Start: 2020-09-28 | End: 2020-12-29 | Stop reason: ALTCHOICE

## 2020-09-28 NOTE — PROGRESS NOTES
CHIEF COMPLAINT:     Patient presents with:  Hypertension: Pt took BP today 129/65 when he woke up. Pt rechecked after shower and it was 139/75. Pt is a bit nervous now. Gout: R ankle.        HPI:   Rikki Pepper is a 63year old male   Patient present (1,000 MG TOTAL) BY MOUTH ONCE DAILY. 180 tablet 0   • colchicine 0.6 MG Oral Tab Take 1 tablet (0.6 mg total) by mouth once daily. 90 tablet 0   • Indomethacin ER 75 MG Oral Cap CR Take 1 capsule (75 mg total) by mouth 2 (two) times daily with meals.  60 c developed, well nourished,in no apparent distress  SKIN: no rashes,no suspicious lesions  HEAD: atraumatic, normocephalic  EYES: conjunctiva clear, EOM intact, PERRLA  HEENT: ears,nose and throat clear  NECK: supple, non-tender  LUNGS: clear to auscultatio dietary modifications to decrease the risk. FMLA forms completed for Pt after his visit. Pt called to come pick them up. - predniSONE 10 MG Oral Tab; Day 1-2 four tabs, day 3-4 three tabs, day 5-6 two tabs,day 7-8 one tab  Dispense: 20 tablet;  Refill: 0

## 2020-09-29 ENCOUNTER — TELEPHONE (OUTPATIENT)
Dept: INTERNAL MEDICINE CLINIC | Facility: CLINIC | Age: 65
End: 2020-09-29

## 2020-09-29 NOTE — TELEPHONE ENCOUNTER
Spoke to pt. He will be here today or tomorrow to p/u. Made copy placed 1 in tickler file above sink cabinet and another to be scanned.

## 2020-10-05 RX ORDER — OLMESARTAN MEDOXOMIL 20 MG/1
20 TABLET ORAL DAILY
Qty: 90 TABLET | Refills: 0 | Status: SHIPPED | OUTPATIENT
Start: 2020-10-05 | End: 2021-01-28

## 2020-10-05 RX ORDER — HYDROCHLOROTHIAZIDE 12.5 MG/1
CAPSULE, GELATIN COATED ORAL
Qty: 90 CAPSULE | Refills: 0 | Status: SHIPPED | OUTPATIENT
Start: 2020-10-05

## 2020-10-05 RX ORDER — OLMESARTAN MEDOXOMIL 40 MG/1
TABLET ORAL
Qty: 90 TABLET | Refills: 0 | OUTPATIENT
Start: 2020-10-05

## 2020-10-05 RX ORDER — METFORMIN HYDROCHLORIDE 500 MG/1
1000 TABLET, EXTENDED RELEASE ORAL
Qty: 180 TABLET | Refills: 0 | Status: SHIPPED | OUTPATIENT
Start: 2020-10-05 | End: 2020-12-31

## 2020-10-05 NOTE — TELEPHONE ENCOUNTER
Refused Benicar 40 mg, dose adjusted to 20 mg daily.     hctz 12.5 mg  Last OV relevant to medication: 9-28-20  Last refill date: 7-8-20 #/refills: 0  When pt was asked to return for OV: Nov. 2020  Upcoming appt/reason: none  Recent labs: 11-5-19: ANNIE García

## 2020-10-16 ENCOUNTER — OFFICE VISIT (OUTPATIENT)
Dept: ORTHOPEDICS CLINIC | Facility: CLINIC | Age: 65
End: 2020-10-16
Payer: COMMERCIAL

## 2020-10-16 VITALS — RESPIRATION RATE: 22 BRPM | SYSTOLIC BLOOD PRESSURE: 126 MMHG | DIASTOLIC BLOOD PRESSURE: 77 MMHG | HEART RATE: 71 BPM

## 2020-10-16 DIAGNOSIS — G56.02 LEFT CARPAL TUNNEL SYNDROME: Primary | ICD-10-CM

## 2020-10-16 PROCEDURE — 3078F DIAST BP <80 MM HG: CPT | Performed by: ORTHOPAEDIC SURGERY

## 2020-10-16 PROCEDURE — 99212 OFFICE O/P EST SF 10 MIN: CPT | Performed by: ORTHOPAEDIC SURGERY

## 2020-10-16 PROCEDURE — 3074F SYST BP LT 130 MM HG: CPT | Performed by: ORTHOPAEDIC SURGERY

## 2020-10-17 NOTE — PROGRESS NOTES
NURSING INTAKE COMMENTS: Patient presents with:  Wrist Pain: Left f/u - states he is better - no more pain - has a little numbness and tingling on and off -      HPI: This 63year old male presents today with complaints of follow-up for left wrist pain wit tablet 0   • Meloxicam 15 MG Oral Tab Take 1 tablet (15 mg total) by mouth daily. 30 tablet 1   • cyclobenzaprine 10 MG Oral Tab Take 10 mg by mouth 3 (three) times daily. • colchicine 0.6 MG Oral Tab Take 1 tablet (0.6 mg total) by mouth once daily.  9 Durkan's compression test after about 5 seconds. Imaging: Xr Elbow, (2 Views), Left (cpt=73070)    Result Date: 9/22/2020  PROCEDURE: XR ELBOW, (2 VIEWS), LEFT (CPT=73070)  COMPARISON: None. INDICATIONS: Posterior left elbow pain for 4 days, no injury.

## 2020-12-14 ENCOUNTER — TELEPHONE (OUTPATIENT)
Dept: INTERNAL MEDICINE CLINIC | Facility: CLINIC | Age: 65
End: 2020-12-14

## 2020-12-21 DIAGNOSIS — S29.019A STRAIN OF MUSCLE AND TENDON OF UNSPECIFIED WALL OF THORAX, INITIAL ENCOUNTER: ICD-10-CM

## 2020-12-21 RX ORDER — MELOXICAM 15 MG/1
15 TABLET ORAL DAILY
Qty: 30 TABLET | Refills: 0 | Status: SHIPPED | OUTPATIENT
Start: 2020-12-21 | End: 2021-04-01 | Stop reason: ALTCHOICE

## 2020-12-22 ENCOUNTER — OFFICE VISIT (OUTPATIENT)
Dept: ORTHOPEDICS CLINIC | Facility: CLINIC | Age: 65
End: 2020-12-22
Payer: COMMERCIAL

## 2020-12-22 VITALS
WEIGHT: 300 LBS | HEIGHT: 66 IN | SYSTOLIC BLOOD PRESSURE: 135 MMHG | HEART RATE: 63 BPM | BODY MASS INDEX: 48.21 KG/M2 | DIASTOLIC BLOOD PRESSURE: 72 MMHG

## 2020-12-22 DIAGNOSIS — M19.032 PRIMARY OSTEOARTHRITIS OF LEFT WRIST: ICD-10-CM

## 2020-12-22 DIAGNOSIS — G56.02 LEFT CARPAL TUNNEL SYNDROME: ICD-10-CM

## 2020-12-22 DIAGNOSIS — M70.22 OLECRANON BURSITIS OF LEFT ELBOW: Primary | ICD-10-CM

## 2020-12-22 PROCEDURE — 3078F DIAST BP <80 MM HG: CPT | Performed by: ORTHOPAEDIC SURGERY

## 2020-12-22 PROCEDURE — 3008F BODY MASS INDEX DOCD: CPT | Performed by: ORTHOPAEDIC SURGERY

## 2020-12-22 PROCEDURE — 3075F SYST BP GE 130 - 139MM HG: CPT | Performed by: ORTHOPAEDIC SURGERY

## 2020-12-22 PROCEDURE — 99213 OFFICE O/P EST LOW 20 MIN: CPT | Performed by: ORTHOPAEDIC SURGERY

## 2020-12-22 NOTE — PROGRESS NOTES
NURSING INTAKE COMMENTS: Patient presents with:  Hand Pain: CTS and fall since last visit, left elbow pain      HPI: This 63year old male presents today with complaints of follow-up left wrist evaluation of left elbow.   The patient did fall directly onto (FREESTYLE LITE) Does not apply Device U UTD  0   • Multiple Vitamin (MULTIVITAMINS) Oral Tab Take 1 Tab by mouth daily. • Meloxicam 15 MG Oral Tab Take 1 tablet (15 mg total) by mouth daily.  (Patient not taking: Reported on 12/22/2020 ) 30 tablet 0 Left wrist exam demonstrates no tenderness palpation about the wrist negative combined Phalen's and Durkan's compression test negative Tinel's at the cubital tunnel and carpal tunnel. Imaging: No results found.          Lab Results   Component Value Date

## 2020-12-28 NOTE — PROGRESS NOTES
Charanjit Lagunas is a 63year old male who presents for a complete physical exam.   HPI:   Pt complains of none but did stop his Metformin because he read that he should not take it with his Benicar. I reassured him there is no interaction between them. 03/20/2018    ALT 88 (H) 05/25/2017     Lab Results   Component Value Date    PSA 0.48 08/13/2018    PSA 0.494 05/25/2017        Current Outpatient Medications   Medication Sig Dispense Refill   • lansoprazole 30 MG Oral Capsule Delayed Release Take 1 caps Surgical History:   Procedure Laterality Date   • HIP REPLACEMENT SURGERY  2014    both   • OTHER SURGICAL HISTORY  2014    bilateral hip replacement      Family History   Adopted: Yes   Problem Relation Age of Onset   • Heart Attack Father    • Heart Erinn Hannah tender,FROM of the back  EXTREMITIES: no cyanosis, clubbing or edema  NEURO: Oriented times three,cranial nerves are intact,motor and sensory are grossly intact    ASSESSMENT AND PLAN:   Jm Duff is a 63year old male who presents for a complete p

## 2020-12-29 ENCOUNTER — PATIENT MESSAGE (OUTPATIENT)
Dept: INTERNAL MEDICINE CLINIC | Facility: CLINIC | Age: 65
End: 2020-12-29

## 2020-12-29 ENCOUNTER — OFFICE VISIT (OUTPATIENT)
Dept: INTERNAL MEDICINE CLINIC | Facility: CLINIC | Age: 65
End: 2020-12-29
Payer: COMMERCIAL

## 2020-12-29 ENCOUNTER — LAB ENCOUNTER (OUTPATIENT)
Dept: LAB | Age: 65
End: 2020-12-29
Attending: FAMILY MEDICINE
Payer: COMMERCIAL

## 2020-12-29 VITALS
RESPIRATION RATE: 18 BRPM | SYSTOLIC BLOOD PRESSURE: 122 MMHG | DIASTOLIC BLOOD PRESSURE: 86 MMHG | TEMPERATURE: 98 F | WEIGHT: 302 LBS | HEART RATE: 72 BPM | HEIGHT: 66 IN | BODY MASS INDEX: 48.53 KG/M2

## 2020-12-29 DIAGNOSIS — Z12.5 SPECIAL SCREENING FOR MALIGNANT NEOPLASM OF PROSTATE: ICD-10-CM

## 2020-12-29 DIAGNOSIS — Z23 NEED FOR INFLUENZA VACCINATION: ICD-10-CM

## 2020-12-29 DIAGNOSIS — Z12.11 ENCOUNTER FOR SCREENING FECAL OCCULT BLOOD TESTING: ICD-10-CM

## 2020-12-29 DIAGNOSIS — Z00.00 ROUTINE GENERAL MEDICAL EXAMINATION AT A HEALTH CARE FACILITY: Primary | ICD-10-CM

## 2020-12-29 DIAGNOSIS — R74.8 ELEVATED ALKALINE PHOSPHATASE LEVEL: ICD-10-CM

## 2020-12-29 DIAGNOSIS — E11.9 DIABETES MELLITUS WITHOUT COMPLICATION (HCC): ICD-10-CM

## 2020-12-29 DIAGNOSIS — R74.8 ELEVATED LIVER ENZYMES: ICD-10-CM

## 2020-12-29 DIAGNOSIS — Z00.00 ROUTINE GENERAL MEDICAL EXAMINATION AT A HEALTH CARE FACILITY: ICD-10-CM

## 2020-12-29 DIAGNOSIS — E11.9 DIABETES MELLITUS WITHOUT COMPLICATION (HCC): Primary | ICD-10-CM

## 2020-12-29 PROCEDURE — 3008F BODY MASS INDEX DOCD: CPT | Performed by: FAMILY MEDICINE

## 2020-12-29 PROCEDURE — 99396 PREV VISIT EST AGE 40-64: CPT | Performed by: FAMILY MEDICINE

## 2020-12-29 PROCEDURE — 3074F SYST BP LT 130 MM HG: CPT | Performed by: FAMILY MEDICINE

## 2020-12-29 PROCEDURE — 36415 COLL VENOUS BLD VENIPUNCTURE: CPT

## 2020-12-29 PROCEDURE — 84443 ASSAY THYROID STIM HORMONE: CPT

## 2020-12-29 PROCEDURE — 80053 COMPREHEN METABOLIC PANEL: CPT

## 2020-12-29 PROCEDURE — 83036 HEMOGLOBIN GLYCOSYLATED A1C: CPT

## 2020-12-29 PROCEDURE — 90471 IMMUNIZATION ADMIN: CPT | Performed by: FAMILY MEDICINE

## 2020-12-29 PROCEDURE — 90686 IIV4 VACC NO PRSV 0.5 ML IM: CPT | Performed by: FAMILY MEDICINE

## 2020-12-29 PROCEDURE — 80061 LIPID PANEL: CPT

## 2020-12-29 PROCEDURE — 3079F DIAST BP 80-89 MM HG: CPT | Performed by: FAMILY MEDICINE

## 2020-12-29 PROCEDURE — 85025 COMPLETE CBC W/AUTO DIFF WBC: CPT

## 2020-12-30 NOTE — TELEPHONE ENCOUNTER
Rachell Pickens, APRN   12/29/2020  6:21 PM      Hgba1c bad up to 7. 6. average glucose 171. Pt needs to restart his Metformin. I checked with Dr. Jacquie Junior and none of his BP meds interfere with his Metformin.  If he does not want to restart it then the we can tr

## 2020-12-30 NOTE — TELEPHONE ENCOUNTER
From: Gianna Arango  To: SHENA Taylor  Sent: 12/29/2020 7:08 PM CST  Subject: Test Results Question    I will do better with the Glucose level, Will go back to the Metformin in the AM. I do feel very well yho even with that Crappy result.  Can we

## 2020-12-31 RX ORDER — METFORMIN HYDROCHLORIDE 500 MG/1
1000 TABLET, EXTENDED RELEASE ORAL
Qty: 180 TABLET | Refills: 0 | Status: SHIPPED | OUTPATIENT
Start: 2020-12-31 | End: 2022-01-25

## 2021-01-28 RX ORDER — OLMESARTAN MEDOXOMIL 20 MG/1
TABLET ORAL
Qty: 90 TABLET | Refills: 0 | Status: SHIPPED | OUTPATIENT
Start: 2021-01-28 | End: 2021-10-12 | Stop reason: ALTCHOICE

## 2021-01-29 RX ORDER — INDOMETHACIN 75 MG/1
75 CAPSULE, EXTENDED RELEASE ORAL 2 TIMES DAILY WITH MEALS
Qty: 60 CAPSULE | Refills: 1 | Status: SHIPPED | OUTPATIENT
Start: 2021-01-29 | End: 2021-07-19

## 2021-02-03 ENCOUNTER — TELEMEDICINE (OUTPATIENT)
Dept: INTERNAL MEDICINE CLINIC | Facility: CLINIC | Age: 66
End: 2021-02-03
Payer: COMMERCIAL

## 2021-02-03 DIAGNOSIS — Z20.822 SUSPECTED COVID-19 VIRUS INFECTION: Primary | ICD-10-CM

## 2021-02-03 PROCEDURE — 99212 OFFICE O/P EST SF 10 MIN: CPT | Performed by: FAMILY MEDICINE

## 2021-02-03 NOTE — PROGRESS NOTES
Virtual Video Check-In     This visit is conducted using Telemedicine with live, interactive video and audio.     Gianna Ramos, who has verified his/her identification by name and , verbally consents to a Virtual/Telephone Check-In visit on  lymphadenopathy  NEURO:  Denies headaches and lightheadedness.    PSYCH: denies depression or anxiety    ALLERGIES:    Seasonal                Runny nose    Current Outpatient Medications   Medication Sig Dispense Refill   • INDOMETHACIN ER 75 MG Oral Cap C Laterality Date   • HIP REPLACEMENT SURGERY  2014    both   • OTHER SURGICAL HISTORY  2014    bilateral hip replacement      Social History    Tobacco Use      Smoking status: Never Smoker      Smokeless tobacco: Never Used    Alcohol use: No    Drug use: completed using two-way, real-time interactive audio and/or video communication.   This has been done in good sonido to provide continuity of care in the best interest of the provider-patient relationship, due to the ongoing public health crisis/national cristin

## 2021-02-04 ENCOUNTER — LAB ENCOUNTER (OUTPATIENT)
Dept: LAB | Facility: HOSPITAL | Age: 66
End: 2021-02-04
Attending: FAMILY MEDICINE
Payer: COMMERCIAL

## 2021-02-04 DIAGNOSIS — Z20.822 SUSPECTED COVID-19 VIRUS INFECTION: ICD-10-CM

## 2021-02-04 LAB — SARS-COV-2 RNA RESP QL NAA+PROBE: NOT DETECTED

## 2021-02-16 ENCOUNTER — TELEMEDICINE (OUTPATIENT)
Dept: INTERNAL MEDICINE CLINIC | Facility: CLINIC | Age: 66
End: 2021-02-16

## 2021-02-16 DIAGNOSIS — J01.90 ACUTE SINUSITIS, RECURRENCE NOT SPECIFIED, UNSPECIFIED LOCATION: Primary | ICD-10-CM

## 2021-02-16 DIAGNOSIS — R06.02 SOB (SHORTNESS OF BREATH): ICD-10-CM

## 2021-02-16 PROCEDURE — 99213 OFFICE O/P EST LOW 20 MIN: CPT | Performed by: FAMILY MEDICINE

## 2021-02-16 RX ORDER — AMOXICILLIN AND CLAVULANATE POTASSIUM 875; 125 MG/1; MG/1
1 TABLET, FILM COATED ORAL 2 TIMES DAILY
Qty: 20 TABLET | Refills: 0 | Status: SHIPPED | OUTPATIENT
Start: 2021-02-16 | End: 2021-02-26

## 2021-02-16 RX ORDER — ALBUTEROL SULFATE 90 UG/1
2 AEROSOL, METERED RESPIRATORY (INHALATION) EVERY 4 HOURS PRN
Qty: 1 INHALER | Refills: 0 | Status: SHIPPED | OUTPATIENT
Start: 2021-02-16

## 2021-02-16 RX ORDER — METHYLPREDNISOLONE 4 MG/1
TABLET ORAL
Qty: 1 KIT | Refills: 0 | Status: SHIPPED | OUTPATIENT
Start: 2021-02-16 | End: 2021-04-01 | Stop reason: ALTCHOICE

## 2021-02-16 NOTE — PROGRESS NOTES
Virtual Video Check-In     This visit is conducted using Telemedicine with live, interactive video and audio.     Jha Kadeemmario, who has verified his/her identification by name and , verbally consents to a Virtual/Telephone Check-In visit on  HYDROCHLOROTHIAZIDE 12.5 MG Oral Cap TAKE 1 CAPSULE BY MOUTH EVERY DAY 90 capsule 0   • amLODIPine Besylate 5 MG Oral Tab Take 1 tablet (5 mg total) by mouth daily.  90 tablet 0   • carvedilol 25 MG Oral Tab Take 1 tablet (25 mg total) by mouth 2 (two) time insight    ASSESSMENT/PLAN:    Sob (shortness of breath)  Acute sinusitis, recurrence not specified, unspecified location  (primary encounter diagnosis)    No orders of the defined types were placed in this encounter.       Meds & Refills for this Visit:  R best interest of the provider-patient relationship, due to the ongoing public health crisis/national emergency and because of restrictions of visitation. There are limitations of this visit as physical exam could not be fully performed.   Every conscious e

## 2021-03-03 DIAGNOSIS — I10 ESSENTIAL HYPERTENSION: ICD-10-CM

## 2021-03-03 NOTE — TELEPHONE ENCOUNTER
LOV: 2/16/2021 with Rudy Bhakta NP  RTC: no follow-up on file  Last Relevant Labs: 12/29/2020   Filled: 10/5/2020  #90 with 0 refills    No future appointments.

## 2021-03-04 RX ORDER — AMLODIPINE BESYLATE 5 MG/1
TABLET ORAL
Qty: 90 TABLET | Refills: 0 | Status: SHIPPED | OUTPATIENT
Start: 2021-03-04 | End: 2021-06-04

## 2021-03-19 DIAGNOSIS — I10 ESSENTIAL HYPERTENSION: ICD-10-CM

## 2021-03-19 NOTE — TELEPHONE ENCOUNTER
LOV: 2/16/2021 with SHENA Haro  RTC: no follow-up on file  Last Relevant Labs: 12/9/2020   Filled: 9/23/2020  #180 with 0 refills    No future appointments.

## 2021-03-20 DIAGNOSIS — Z23 NEED FOR VACCINATION: ICD-10-CM

## 2021-03-20 RX ORDER — CARVEDILOL 25 MG/1
TABLET ORAL
Qty: 180 TABLET | Refills: 0 | Status: SHIPPED | OUTPATIENT
Start: 2021-03-20 | End: 2021-08-02

## 2021-04-01 ENCOUNTER — OFFICE VISIT (OUTPATIENT)
Dept: INTERNAL MEDICINE CLINIC | Facility: CLINIC | Age: 66
End: 2021-04-01
Payer: COMMERCIAL

## 2021-04-01 ENCOUNTER — ORDER TRANSCRIPTION (OUTPATIENT)
Dept: SLEEP CENTER | Age: 66
End: 2021-04-01

## 2021-04-01 VITALS
SYSTOLIC BLOOD PRESSURE: 138 MMHG | OXYGEN SATURATION: 99 % | RESPIRATION RATE: 16 BRPM | BODY MASS INDEX: 50.22 KG/M2 | WEIGHT: 312.5 LBS | DIASTOLIC BLOOD PRESSURE: 96 MMHG | HEART RATE: 63 BPM | HEIGHT: 66 IN | TEMPERATURE: 98 F

## 2021-04-01 DIAGNOSIS — R06.02 SOB (SHORTNESS OF BREATH): Primary | ICD-10-CM

## 2021-04-01 DIAGNOSIS — G47.00 INSOMNIA, UNSPECIFIED TYPE: ICD-10-CM

## 2021-04-01 DIAGNOSIS — G47.33 OBSTRUCTIVE SLEEP APNEA (ADULT) (PEDIATRIC): Primary | ICD-10-CM

## 2021-04-01 DIAGNOSIS — E11.9 DIABETES MELLITUS WITHOUT COMPLICATION (HCC): ICD-10-CM

## 2021-04-01 DIAGNOSIS — I10 ESSENTIAL HYPERTENSION: ICD-10-CM

## 2021-04-01 PROCEDURE — 3051F HG A1C>EQUAL 7.0%<8.0%: CPT | Performed by: FAMILY MEDICINE

## 2021-04-01 PROCEDURE — 99214 OFFICE O/P EST MOD 30 MIN: CPT | Performed by: FAMILY MEDICINE

## 2021-04-01 PROCEDURE — 3080F DIAST BP >= 90 MM HG: CPT | Performed by: FAMILY MEDICINE

## 2021-04-01 PROCEDURE — 3008F BODY MASS INDEX DOCD: CPT | Performed by: FAMILY MEDICINE

## 2021-04-01 PROCEDURE — 3075F SYST BP GE 130 - 139MM HG: CPT | Performed by: FAMILY MEDICINE

## 2021-04-01 PROCEDURE — 83036 HEMOGLOBIN GLYCOSYLATED A1C: CPT | Performed by: FAMILY MEDICINE

## 2021-04-01 NOTE — PROGRESS NOTES
CHIEF COMPLAINT:     Patient presents with: Insomnia: For the past month   Hypertension      HPI:   Cristobal Samson is a 61year old male   Patient presents for recheck of their hypertension.  no medication side effects, home BP monitoring has not been d MOUTH 2 (TWO) TIMES DAILY WITH MEALS. 60 capsule 1   • OLMESARTAN MEDOXOMIL 20 MG Oral Tab TAKE 1 TABLET BY MOUTH EVERY DAY 90 tablet 0   • metFORMIN HCl  MG Oral Tablet 24 Hr Take 2 tablets (1,000 mg total) by mouth once daily.  180 tablet 0   • lans Temp 97.9 °F (36.6 °C) (Oral)   Resp 16   Ht 5' 6\" (1.676 m)   Wt (!) 312 lb 8 oz (141.7 kg)   SpO2 99%   BMI 50.44 kg/m²   GENERAL: well developed, well nourished,in no apparent distress  SKIN: no rashes,no suspicious lesions  HEAD: atraumatic, normoceph Benadryl.  - OP [facility] ALT REFERRAL DIAGOSTIC SLEEP STUDY ADULT  - GENERAL SLEEP STUDY TRANSCRIPTION; Future    4.  Diabetes mellitus without complication ([facility] [address]  - Pt to restart his Metformin but to take a probiotic at least an hour prior to take his medicatio

## 2021-04-21 ENCOUNTER — TELEPHONE (OUTPATIENT)
Dept: INTERNAL MEDICINE CLINIC | Facility: CLINIC | Age: 66
End: 2021-04-21

## 2021-04-21 DIAGNOSIS — E66.01 CLASS 3 SEVERE OBESITY DUE TO EXCESS CALORIES WITH SERIOUS COMORBIDITY AND BODY MASS INDEX (BMI) OF 50.0 TO 59.9 IN ADULT (HCC): ICD-10-CM

## 2021-04-21 DIAGNOSIS — G47.00 INSOMNIA, UNSPECIFIED TYPE: Primary | ICD-10-CM

## 2021-04-21 DIAGNOSIS — I10 ESSENTIAL HYPERTENSION: ICD-10-CM

## 2021-04-21 DIAGNOSIS — R06.02 SOB (SHORTNESS OF BREATH): ICD-10-CM

## 2021-04-21 NOTE — TELEPHONE ENCOUNTER
FW: sleep study denied  Received: SHENA White  [facility] Staff  Lets order a home sleep study since that is covered.    Jay Quigley, 04/20/21, 8:33 PM           Previous Messages       ----- Message -----   From: Chris Taylor

## 2021-04-23 ENCOUNTER — HOSPITAL ENCOUNTER (OUTPATIENT)
Dept: CV DIAGNOSTICS | Age: 66
Discharge: HOME | End: 2021-04-23
Attending: FAMILY MEDICINE
Payer: COMMERCIAL

## 2021-04-23 DIAGNOSIS — I10 ESSENTIAL HYPERTENSION: ICD-10-CM

## 2021-04-23 DIAGNOSIS — R06.02 SOB (SHORTNESS OF BREATH): ICD-10-CM

## 2021-04-23 PROCEDURE — 93306 TTE W/DOPPLER COMPLETE: CPT | Performed by: FAMILY MEDICINE

## 2021-04-26 ENCOUNTER — TELEMEDICINE (OUTPATIENT)
Dept: INTERNAL MEDICINE CLINIC | Facility: CLINIC | Age: 66
End: 2021-04-26

## 2021-04-26 DIAGNOSIS — E11.9 DIABETES MELLITUS WITHOUT COMPLICATION (HCC): ICD-10-CM

## 2021-04-26 DIAGNOSIS — I10 ESSENTIAL HYPERTENSION: Primary | ICD-10-CM

## 2021-04-26 PROCEDURE — 99212 OFFICE O/P EST SF 10 MIN: CPT | Performed by: FAMILY MEDICINE

## 2021-04-26 NOTE — PROGRESS NOTES
Virtual Video Check-In     This visit is conducted using Telemedicine with live, interactive video and audio.     Winifred Wright, who has verified his/her identification by name and , verbally consents to a Virtual/Telephone Check-In visit on  EVERY DAY 90 tablet 0   • Albuterol Sulfate HFA (PROAIR HFA) 108 (90 Base) MCG/ACT Inhalation Aero Soln Inhale 2 puffs into the lungs every 4 (four) hours as needed for Shortness of Breath.  1 Inhaler 0   • INDOMETHACIN ER 75 MG Oral Cap CR TAKE 1 CAPSULE ( of skin  HEENT: normocephalic, atraumatic, conjunctiva clear  LUNGS: regular breathing rate and effort with no audible respiratory distress  NEURO: A&Ox3  PSYCH: pleasant mood and affect, appropriate judgement and insight    ASSESSMENT/PLAN:    Essential h care above. Coding/billing information is submitted for this visit based on complexity of care and/or time spent for the visit.       Start Time: 11:15 am   End Time: 11:39 am    Rudy CHATTERJEE

## 2021-04-28 ENCOUNTER — IMMUNIZATION (OUTPATIENT)
Dept: LAB | Age: 66
End: 2021-04-28
Attending: HOSPITALIST
Payer: COMMERCIAL

## 2021-04-28 DIAGNOSIS — Z23 NEED FOR VACCINATION: Primary | ICD-10-CM

## 2021-04-28 PROCEDURE — 0001A SARSCOV2 VAC 30MCG/0.3ML IM: CPT

## 2021-05-07 ENCOUNTER — HOSPITAL ENCOUNTER (OUTPATIENT)
Dept: GENERAL RADIOLOGY | Age: 66
Discharge: HOME | End: 2021-05-07
Attending: FAMILY MEDICINE
Payer: COMMERCIAL

## 2021-05-07 DIAGNOSIS — R06.02 SOB (SHORTNESS OF BREATH): ICD-10-CM

## 2021-05-07 PROCEDURE — 71046 X-RAY EXAM CHEST 2 VIEWS: CPT | Performed by: FAMILY MEDICINE

## 2021-05-20 ENCOUNTER — IMMUNIZATION (OUTPATIENT)
Dept: LAB | Age: 66
End: 2021-05-20
Attending: HOSPITALIST
Payer: COMMERCIAL

## 2021-05-20 DIAGNOSIS — Z23 NEED FOR VACCINATION: Primary | ICD-10-CM

## 2021-05-20 PROCEDURE — 0002A SARSCOV2 VAC 30MCG/0.3ML IM: CPT

## 2021-05-26 ENCOUNTER — TELEPHONE (OUTPATIENT)
Dept: PULMONOLOGY | Facility: CLINIC | Age: 66
End: 2021-05-26

## 2021-05-26 ENCOUNTER — OFFICE VISIT (OUTPATIENT)
Dept: PULMONOLOGY | Facility: CLINIC | Age: 66
End: 2021-05-26
Payer: COMMERCIAL

## 2021-05-26 VITALS
RESPIRATION RATE: 18 BRPM | DIASTOLIC BLOOD PRESSURE: 87 MMHG | HEART RATE: 75 BPM | BODY MASS INDEX: 48.37 KG/M2 | HEIGHT: 66 IN | OXYGEN SATURATION: 95 % | SYSTOLIC BLOOD PRESSURE: 154 MMHG | WEIGHT: 301 LBS

## 2021-05-26 DIAGNOSIS — J98.6 ELEVATED DIAPHRAGM: ICD-10-CM

## 2021-05-26 DIAGNOSIS — R06.02 SOB (SHORTNESS OF BREATH): Primary | ICD-10-CM

## 2021-05-26 DIAGNOSIS — E66.01 MORBID OBESITY (HCC): ICD-10-CM

## 2021-05-26 DIAGNOSIS — R93.89 ABNORMAL CXR (CHEST X-RAY): ICD-10-CM

## 2021-05-26 PROCEDURE — 99244 OFF/OP CNSLTJ NEW/EST MOD 40: CPT | Performed by: INTERNAL MEDICINE

## 2021-05-26 PROCEDURE — 3077F SYST BP >= 140 MM HG: CPT | Performed by: INTERNAL MEDICINE

## 2021-05-26 PROCEDURE — 3079F DIAST BP 80-89 MM HG: CPT | Performed by: INTERNAL MEDICINE

## 2021-05-26 PROCEDURE — 3008F BODY MASS INDEX DOCD: CPT | Performed by: INTERNAL MEDICINE

## 2021-05-26 NOTE — TELEPHONE ENCOUNTER
----- Message from Ronnie Gallegos MD sent at 5/26/2021 11:56 AM CDT -----  I want to order a \"sniff\" test. It is a fluoroscopy test of the diaphram done while the pt \"sniffs\"    However I can not find it in EPIC.  I ordered something related to fluorosc

## 2021-05-26 NOTE — PROGRESS NOTES
Pulmonary Consult Note    HPI:   Min Steward is a 61year old male with Chief Complaint Patient presents with:  Consult: Dayanara Lindsay MD    Pt referred for abnl CXR    Pt notes breathing problems  PATEL was severe   Now better    Not aware Breath. 1 Inhaler 0   • INDOMETHACIN ER 75 MG Oral Cap CR TAKE 1 CAPSULE (75 MG TOTAL) BY MOUTH 2 (TWO) TIMES DAILY WITH MEALS.  60 capsule 1   • OLMESARTAN MEDOXOMIL 20 MG Oral Tab TAKE 1 TABLET BY MOUTH EVERY DAY 90 tablet 0   • metFORMIN HCl  MG Or Anicteric conjunctiva, lids normal, Pupils equal and reactive to light, No nystagmus  ENT normal external ears and nose, normal lips  Neck symmetrical, no masses, no tracheal deviation  Lung Normal effort, CTA bilaterally   CV Reg, NL S1S2, No M/G/R apprec motivated to lose weight    CKD  Pt states he was not aware  Extensive counseling provided  Plan follow   Rec aggressive management of HTN and DM              Pierre Koo MD  5/26/2021  11:15 AM

## 2021-05-28 NOTE — TELEPHONE ENCOUNTER
Dr. Yesica Hubbard pended order for XR SNIFF TEST for your signature. Should I DC other XR FLUOROSCOPY order? *the reason you couldn't find test is b/c it's not an ambulatory order and I needed to call [facility] for access.

## 2021-06-04 DIAGNOSIS — I10 ESSENTIAL HYPERTENSION: ICD-10-CM

## 2021-06-04 RX ORDER — AMLODIPINE BESYLATE 5 MG/1
TABLET ORAL
Qty: 90 TABLET | Refills: 0 | Status: SHIPPED | OUTPATIENT
Start: 2021-06-04 | End: 2021-08-02

## 2021-06-04 NOTE — TELEPHONE ENCOUNTER
Sent my chart message for pt to schedule appt. For BP f/u. Amlodipine 5 mg  Filled 3-4-21  Qty 90  0 refills  No upcoming appt.    RTC 1 month  Telemed: 4-26-21

## 2021-07-16 ENCOUNTER — TELEPHONE (OUTPATIENT)
Dept: PULMONOLOGY | Facility: CLINIC | Age: 66
End: 2021-07-16

## 2021-07-19 RX ORDER — INDOMETHACIN 75 MG/1
75 CAPSULE, EXTENDED RELEASE ORAL 2 TIMES DAILY WITH MEALS
Qty: 60 CAPSULE | Refills: 1 | Status: SHIPPED | OUTPATIENT
Start: 2021-07-19

## 2021-07-19 NOTE — TELEPHONE ENCOUNTER
Name from pharmacy: INDOMETHACIN ER 76 [facility]          Will file in chart as: INDOMETHACIN ER 75 MG Oral Cap CR    Sig: TAKE 1 CAPSULE (75 MG TOTAL) BY MOUTH 2 (TWO) TIMES DAILY WITH MEALS.     Disp:  60 capsule    Refills:  1    Start: 7/19/2021    Cla

## 2021-07-21 ENCOUNTER — TELEPHONE (OUTPATIENT)
Dept: PULMONOLOGY | Facility: CLINIC | Age: 66
End: 2021-07-21

## 2021-08-02 DIAGNOSIS — I10 ESSENTIAL HYPERTENSION: ICD-10-CM

## 2021-08-02 DIAGNOSIS — M10.9 GOUTY ARTHROPATHY: ICD-10-CM

## 2021-08-03 RX ORDER — COLCHICINE 0.6 MG/1
0.6 TABLET ORAL
Qty: 90 TABLET | Refills: 0 | Status: SHIPPED | OUTPATIENT
Start: 2021-08-03 | End: 2021-10-25

## 2021-08-03 RX ORDER — CARVEDILOL 25 MG/1
25 TABLET ORAL 2 TIMES DAILY WITH MEALS
Qty: 180 TABLET | Refills: 0 | Status: SHIPPED | OUTPATIENT
Start: 2021-08-03 | End: 2022-01-11

## 2021-08-03 RX ORDER — AMLODIPINE BESYLATE 5 MG/1
5 TABLET ORAL DAILY
Qty: 90 TABLET | Refills: 0 | Status: SHIPPED | OUTPATIENT
Start: 2021-08-03 | End: 2021-10-19

## 2021-08-03 NOTE — TELEPHONE ENCOUNTER
carvedilol 25 MG Oral Tab         Sig: Take 1 tablet (25 mg total) by mouth 2 (two) times daily with meals.     Disp:  180 tablet    Refills:  0    Start: 8/2/2021    Class: Normal    Non-formulary For: Essential hypertension    Last ordered: 4 months ago

## 2021-08-03 NOTE — TELEPHONE ENCOUNTER
colchicine 0.6 MG Oral Tab          Sig: Take 1 tablet (0.6 mg total) by mouth once daily.     Disp:  90 tablet    Refills:  0    Start: 8/2/2021    Class: Normal    Non-formulary For: Gouty arthropathy    Last ordered: 1 year ago by SHENA Ventura

## 2021-08-17 ENCOUNTER — TELEMEDICINE (OUTPATIENT)
Dept: INTERNAL MEDICINE CLINIC | Facility: CLINIC | Age: 66
End: 2021-08-17
Payer: COMMERCIAL

## 2021-08-17 DIAGNOSIS — M10.9 ACUTE GOUT INVOLVING TOE OF LEFT FOOT, UNSPECIFIED CAUSE: Primary | ICD-10-CM

## 2021-08-17 PROCEDURE — 99213 OFFICE O/P EST LOW 20 MIN: CPT | Performed by: FAMILY MEDICINE

## 2021-08-17 RX ORDER — METHYLPREDNISOLONE 4 MG/1
TABLET ORAL
Qty: 1 EACH | Refills: 0 | Status: SHIPPED | OUTPATIENT
Start: 2021-08-17 | End: 2021-10-12 | Stop reason: ALTCHOICE

## 2021-08-17 NOTE — PROGRESS NOTES
Virtual Video Check-In     This visit is conducted using Telemedicine with live, interactive video and audio.     Sayra Arriolajorge alberto, who has verified his/her identification by name and , verbally consents to a Virtual/Telephone Check-In visit on  Oral Tab Take 1 tablet (0.6 mg total) by mouth once daily. 90 tablet 0   • INDOMETHACIN ER 75 MG Oral Cap CR TAKE 1 CAPSULE (75 MG TOTAL) BY MOUTH 2 (TWO) TIMES DAILY WITH MEALS.  60 capsule 1   • Albuterol Sulfate HFA (PROAIR HFA) 108 (90 Base) MCG/ACT Inh video visit.   GENERAL: well developed, well nourished, in no acute distress  SKIN: no rashes on visible parts of skin  HEENT: normocephalic, atraumatic, conjunctiva clear  LUNGS: regular breathing rate and effort with no audible respiratory distress  NEURO

## 2021-10-12 ENCOUNTER — OFFICE VISIT (OUTPATIENT)
Dept: INTERNAL MEDICINE CLINIC | Facility: CLINIC | Age: 66
End: 2021-10-12
Payer: COMMERCIAL

## 2021-10-12 VITALS
HEART RATE: 78 BPM | BODY MASS INDEX: 48.86 KG/M2 | DIASTOLIC BLOOD PRESSURE: 80 MMHG | TEMPERATURE: 98 F | HEIGHT: 66 IN | SYSTOLIC BLOOD PRESSURE: 136 MMHG | RESPIRATION RATE: 18 BRPM | WEIGHT: 304 LBS

## 2021-10-12 DIAGNOSIS — M54.42 ACUTE BILATERAL LOW BACK PAIN WITH BILATERAL SCIATICA: Primary | ICD-10-CM

## 2021-10-12 DIAGNOSIS — M54.41 ACUTE BILATERAL LOW BACK PAIN WITH BILATERAL SCIATICA: Primary | ICD-10-CM

## 2021-10-12 DIAGNOSIS — M10.9 GOUTY ARTHROPATHY: ICD-10-CM

## 2021-10-12 PROCEDURE — 3079F DIAST BP 80-89 MM HG: CPT | Performed by: FAMILY MEDICINE

## 2021-10-12 PROCEDURE — 3008F BODY MASS INDEX DOCD: CPT | Performed by: FAMILY MEDICINE

## 2021-10-12 PROCEDURE — 99214 OFFICE O/P EST MOD 30 MIN: CPT | Performed by: FAMILY MEDICINE

## 2021-10-12 PROCEDURE — 3075F SYST BP GE 130 - 139MM HG: CPT | Performed by: FAMILY MEDICINE

## 2021-10-12 RX ORDER — HYDROCODONE BITARTRATE AND ACETAMINOPHEN 5; 325 MG/1; MG/1
1 TABLET ORAL EVERY 8 HOURS PRN
Qty: 20 TABLET | Refills: 0 | Status: SHIPPED | OUTPATIENT
Start: 2021-10-12

## 2021-10-12 RX ORDER — PREDNISONE 10 MG/1
TABLET ORAL
Qty: 60 TABLET | Refills: 0 | Status: SHIPPED | OUTPATIENT
Start: 2021-10-12

## 2021-10-12 NOTE — PROGRESS NOTES
CHIEF COMPLAINT:     Patient presents with:  Pain: Pt states hip/buttocks area hurts and states it shoots down to legs. Pt is using cane. Yesterday, pt was getting out of the car and once out, could not move. Pt used 500 mg Naproxen.  Pt using cane and walk CAPSULE (75 MG TOTAL) BY MOUTH 2 (TWO) TIMES DAILY WITH MEALS. 60 capsule 1   • Albuterol Sulfate HFA (PROAIR HFA) 108 (90 Base) MCG/ACT Inhalation Aero Soln Inhale 2 puffs into the lungs every 4 (four) hours as needed for Shortness of Breath.  1 Inhaler 0 no rashes,no suspicious lesions  NECK: supple,no adenopathy,no bruits  LUNGS: clear to auscultation  CARDIO: RRR without murmur  GI: normoactive bs x4, no masses, HSM or tenderness  EXTREMITIES: no cyanosis, clubbing or edema  BACK: Left buttock tense/tigh

## 2021-10-13 ENCOUNTER — TELEPHONE (OUTPATIENT)
Dept: INTERNAL MEDICINE CLINIC | Facility: CLINIC | Age: 66
End: 2021-10-13

## 2021-10-13 ENCOUNTER — PATIENT MESSAGE (OUTPATIENT)
Dept: INTERNAL MEDICINE CLINIC | Facility: CLINIC | Age: 66
End: 2021-10-13

## 2021-10-13 NOTE — TELEPHONE ENCOUNTER
From: Adriana Mack  To: Luly Rowland  Sent: 10/13/2021 11:54 AM CDT  Subject: Carson Baron morning Tian Mann finished filling out your FMLA and it is ready for . It will be at our front office.  Please let us know if you have any quest

## 2021-10-13 NOTE — TELEPHONE ENCOUNTER
Sent [email or web address] message stating paper work is ready for  and placed at . .     Made copy and placed to scan and placed other copy in accordion above sink cabinet.

## 2021-10-14 ENCOUNTER — PATIENT MESSAGE (OUTPATIENT)
Dept: INTERNAL MEDICINE CLINIC | Facility: CLINIC | Age: 66
End: 2021-10-14

## 2021-10-14 NOTE — TELEPHONE ENCOUNTER
Faxed over. Placed copy to scan, placed copy in accordion file in cabinet above sink.    Placed original copy in packet file at the . (along with his FMLA paper)

## 2021-10-14 NOTE — TELEPHONE ENCOUNTER
From: Pj Neil  To:  SHENA Camp  Sent: 10/14/2021 10:26 AM CDT  Subject: Absence Certificate    Good Morning,  During this time when I came in for my Appointment, I forgot about bringing this Absence Certificate Along with the other packet

## 2021-10-18 ENCOUNTER — PATIENT MESSAGE (OUTPATIENT)
Dept: INTERNAL MEDICINE CLINIC | Facility: CLINIC | Age: 66
End: 2021-10-18

## 2021-10-19 DIAGNOSIS — I10 ESSENTIAL HYPERTENSION: ICD-10-CM

## 2021-10-19 RX ORDER — AMLODIPINE BESYLATE 5 MG/1
TABLET ORAL
Qty: 90 TABLET | Refills: 0 | Status: SHIPPED | OUTPATIENT
Start: 2021-10-19 | End: 2022-01-20

## 2021-10-19 NOTE — TELEPHONE ENCOUNTER
From: Cristobal Samson  To: SHENA Sargent  Sent: 10/18/2021 9:53 PM CDT  Subject: FMLA Forms    Good Morning,   I am attaching the letter that the Laz at [facility] sent me about the [facility] Paperwork that was faxed in last week.  In Section F

## 2021-10-19 NOTE — TELEPHONE ENCOUNTER
Name from pharmacy: AMLODIPINE BESYLATE 5 MG TAB          Will file in chart as: AMLODIPINE 5 MG Oral Tab    Sig: TAKE 1 TABLET BY MOUTH EVERY DAY    Disp:  90 tablet    Refills:  0 (Pharmacy requested: Not specified)    Start: 10/19/2021    Class: Normal

## 2021-10-19 NOTE — TELEPHONE ENCOUNTER
I can not do intermittent times,just intermittent leave. But  they do want is how many times per year is Pt going to have a gout attack like 6 x a year or what? Can you check with Pt?   They letter states I can make changes I just need to initial them

## 2021-10-20 NOTE — TELEPHONE ENCOUNTER
Spoke with patient to inform him that paperwork has been completed and that it is ready for  when he can    Patient stated he will come today to get paperwork    Placed at  in accordion folder and copy sent to scan as well as in accordion

## 2021-10-20 NOTE — TELEPHONE ENCOUNTER
Pt picked up paperwork but stated that a part was not filled out    Pt states that he needs the Unable to work from portion filled out stating that he cannot work from 10/11 to 10/16    Patient states that he will  form once this is completed     Pl

## 2021-10-21 NOTE — TELEPHONE ENCOUNTER
SM completed paperwork    Placed at  in accordion folder for patient to     Patient made aware of paperwork being ready for  via VM

## 2021-10-25 DIAGNOSIS — M10.9 GOUTY ARTHROPATHY: ICD-10-CM

## 2021-10-25 RX ORDER — COLCHICINE 0.6 MG/1
0.6 TABLET ORAL
Qty: 90 TABLET | Refills: 0 | Status: SHIPPED | OUTPATIENT
Start: 2021-10-25 | End: 2022-01-20

## 2021-10-25 NOTE — TELEPHONE ENCOUNTER
Name from pharmacy: COLCHICINE 0.6 MG TABLET         Will file in chart as: COLCHICINE 0.6 MG Oral Tab    Sig: Take 1 tablet (0.6 mg total) by mouth once daily.     Disp:  90 tablet    Refills:  0 (Pharmacy requested: Not specified)    Start: 10/25/2021

## 2021-11-09 ENCOUNTER — PATIENT MESSAGE (OUTPATIENT)
Dept: INTERNAL MEDICINE CLINIC | Facility: CLINIC | Age: 66
End: 2021-11-09

## 2021-11-10 RX ORDER — DICLOFENAC SODIUM 75 MG/1
75 TABLET, DELAYED RELEASE ORAL 3 TIMES DAILY PRN
Qty: 90 TABLET | Refills: 0 | Status: SHIPPED | OUTPATIENT
Start: 2021-11-10 | End: 2021-12-05

## 2021-11-12 ENCOUNTER — ORDER TRANSCRIPTION (OUTPATIENT)
Dept: PHYSICAL THERAPY | Facility: HOSPITAL | Age: 66
End: 2021-11-12

## 2021-11-12 ENCOUNTER — PATIENT MESSAGE (OUTPATIENT)
Dept: INTERNAL MEDICINE CLINIC | Facility: CLINIC | Age: 66
End: 2021-11-12

## 2021-11-12 DIAGNOSIS — M48.062 SPINAL STENOSIS, LUMBAR REGION, WITH NEUROGENIC CLAUDICATION: Primary | ICD-10-CM

## 2021-11-12 NOTE — TELEPHONE ENCOUNTER
From: Natalie Vila  To: SHENA Gibbs  Sent: 11/12/2021 2:10 PM CST  Subject: Back Pain    Sending a copy of the Order this Dr. Eli Julian gave me for physical therapy with the diagnosis of Spinal Stenosis , with Neurogenic Claudication.  I also have

## 2021-11-13 ENCOUNTER — PATIENT MESSAGE (OUTPATIENT)
Dept: INTERNAL MEDICINE CLINIC | Facility: CLINIC | Age: 66
End: 2021-11-13

## 2021-11-13 RX ORDER — PREDNISONE 10 MG/1
TABLET ORAL
Qty: 60 TABLET | Refills: 0 | OUTPATIENT
Start: 2021-11-13

## 2021-11-15 ENCOUNTER — TELEMEDICINE (OUTPATIENT)
Dept: INTERNAL MEDICINE CLINIC | Facility: CLINIC | Age: 66
End: 2021-11-15

## 2021-11-15 DIAGNOSIS — M48.062 SPINAL STENOSIS, LUMBAR REGION WITH NEUROGENIC CLAUDICATION: Primary | ICD-10-CM

## 2021-11-15 PROCEDURE — 99213 OFFICE O/P EST LOW 20 MIN: CPT | Performed by: FAMILY MEDICINE

## 2021-11-15 RX ORDER — METHYLPREDNISOLONE 4 MG/1
TABLET ORAL
Qty: 1 EACH | Refills: 0 | Status: SHIPPED | OUTPATIENT
Start: 2021-11-15

## 2021-11-15 NOTE — TELEPHONE ENCOUNTER
From: Pj Neil  To:  SHENA Camp  Sent: 11/13/2021 11:21 AM CST  Subject: Back Issue    Alice, I have read that maybe a Cortisone Shot may help this Situation, I am doing light stretching to try to keep going, and I am hoping that the shot w

## 2021-11-15 NOTE — PROGRESS NOTES
Virtual Video Check-In     This visit is conducted using Telemedicine with live, interactive video and audio.     Elisa Michelle, who has verified his/her identification by name and , verbally consents to a Virtual/Telephone Check-In visit on 11/15/ day 10-12 3 tabs, day 13-15 2 tabs 60 tablet 0   • HYDROcodone-acetaminophen (NORCO) 5-325 MG Oral Tab Take 1 tablet by mouth every 8 (eight) hours as needed for Pain.  20 tablet 0   • carvedilol 25 MG Oral Tab Take 1 tablet (25 mg total) by mouth 2 (two) t nourished, in no acute distress  SKIN: no rashes on visible parts of skin  HEENT: normocephalic, atraumatic, conjunctiva clear  LUNGS: regular breathing rate and effort with no audible respiratory distress  NEURO: A&Ox3  MUSC: Pt appears uncomfortable and

## 2021-11-16 NOTE — TELEPHONE ENCOUNTER
Patient was seen during tele visit with SM yesterday 11/15/21:      \"HPI: Pt is still having lower back pain with leg pain. Pt saw Dr. Khadijah Ross and he was diagnosed with spinal stenosis lumbar region with neurogenic claudication.  Pt was given order for PT a

## 2021-11-17 ENCOUNTER — PATIENT MESSAGE (OUTPATIENT)
Dept: INTERNAL MEDICINE CLINIC | Facility: CLINIC | Age: 66
End: 2021-11-17

## 2021-11-17 RX ORDER — OLMESARTAN MEDOXOMIL 20 MG/1
20 TABLET ORAL DAILY
Qty: 90 TABLET | Refills: 0 | Status: SHIPPED | OUTPATIENT
Start: 2021-11-17

## 2021-11-17 NOTE — TELEPHONE ENCOUNTER
From: Mason Macdonald  To:  SHENA Cox  Sent: 11/17/2021 12:12 AM CST  Subject: Back    Met with Dr. Gopal Qiu today, Doctors getting Younger and Younger I see, He took six (6) Xrays and came in and explained what Spinal Stenosis was, then told

## 2021-11-23 ENCOUNTER — TELEPHONE (OUTPATIENT)
Dept: PHYSICAL THERAPY | Facility: HOSPITAL | Age: 66
End: 2021-11-23

## 2021-11-26 ENCOUNTER — TELEPHONE (OUTPATIENT)
Dept: PHYSICAL THERAPY | Facility: HOSPITAL | Age: 66
End: 2021-11-26

## 2021-11-29 ENCOUNTER — APPOINTMENT (OUTPATIENT)
Dept: PHYSICAL THERAPY | Facility: HOSPITAL | Age: 66
End: 2021-11-29
Attending: PHYSICAL MEDICINE & REHABILITATION
Payer: COMMERCIAL

## 2021-12-02 ENCOUNTER — APPOINTMENT (OUTPATIENT)
Dept: PHYSICAL THERAPY | Facility: HOSPITAL | Age: 66
End: 2021-12-02
Attending: PHYSICAL MEDICINE & REHABILITATION

## 2021-12-05 RX ORDER — DICLOFENAC SODIUM 75 MG/1
75 TABLET, DELAYED RELEASE ORAL 3 TIMES DAILY PRN
Qty: 90 TABLET | Refills: 0 | Status: SHIPPED | OUTPATIENT
Start: 2021-12-05

## 2021-12-22 ENCOUNTER — APPOINTMENT (OUTPATIENT)
Dept: PHYSICAL THERAPY | Facility: HOSPITAL | Age: 66
End: 2021-12-22
Attending: PHYSICAL MEDICINE & REHABILITATION

## 2021-12-29 ENCOUNTER — APPOINTMENT (OUTPATIENT)
Dept: PHYSICAL THERAPY | Facility: HOSPITAL | Age: 66
End: 2021-12-29
Attending: PHYSICAL MEDICINE & REHABILITATION

## 2022-01-04 ENCOUNTER — APPOINTMENT (OUTPATIENT)
Dept: PHYSICAL THERAPY | Facility: HOSPITAL | Age: 67
End: 2022-01-04
Attending: PHYSICAL MEDICINE & REHABILITATION

## 2022-01-07 ENCOUNTER — APPOINTMENT (OUTPATIENT)
Dept: PHYSICAL THERAPY | Facility: HOSPITAL | Age: 67
End: 2022-01-07
Attending: PHYSICAL MEDICINE & REHABILITATION

## 2022-01-11 ENCOUNTER — APPOINTMENT (OUTPATIENT)
Dept: PHYSICAL THERAPY | Facility: HOSPITAL | Age: 67
End: 2022-01-11
Attending: PHYSICAL MEDICINE & REHABILITATION

## 2022-01-11 DIAGNOSIS — I10 ESSENTIAL HYPERTENSION: ICD-10-CM

## 2022-01-11 RX ORDER — CARVEDILOL 25 MG/1
TABLET ORAL
Qty: 180 TABLET | Refills: 0 | Status: SHIPPED | OUTPATIENT
Start: 2022-01-11

## 2022-01-11 NOTE — TELEPHONE ENCOUNTER
Carvedilol 25 mg failed protocol due to   Hypertension Medications Protocol Failed 01/11/2022 10:42 AM   Protocol Details  CMP or BMP in past 12 months      Filled 8-3-21  Qty 180  0 refills  No upcoming appt. Telemed.  4-26-21

## 2022-01-12 NOTE — PROGRESS NOTES
Reuben Nolan is a 61year old male who presents for a complete physical exam.   HPI:   Pt complains of ***.   Screening:  Tdap - 5/25/17  Labs- 12/29/20  PSA- 12/29/20  Colon- none,DUE   Dexa Scan over age 79 yr: n/a  Family Hx: Prostate cancer none, C 0.494 05/25/2017        Current Outpatient Medications   Medication Sig Dispense Refill   • CARVEDILOL 25 MG Oral Tab TAKE 1 TABLET BY MOUTH TWICE A DAY WITH FOOD 180 tablet 0   • DICLOFENAC 75 MG Oral Tab EC TAKE 1 TABLET (75 MG TOTAL) BY MOUTH 3 (THREE) gallstones    • History of morbid obesity       Past Surgical History:   Procedure Laterality Date   • HIP REPLACEMENT SURGERY  2014    both   • OTHER SURGICAL HISTORY  2014    bilateral hip replacement      Family History   Adopted: Yes   Problem Relation tender,FROM of the back  EXTREMITIES: no cyanosis, clubbing or edema  NEURO: Oriented times three,cranial nerves are intact,motor and sensory are grossly intact    ASSESSMENT AND PLAN:   Светлана Aguayo is a 61year old male who presents for a complete p

## 2022-01-13 ENCOUNTER — LAB ENCOUNTER (OUTPATIENT)
Dept: LAB | Facility: HOSPITAL | Age: 67
End: 2022-01-13
Attending: FAMILY MEDICINE
Payer: COMMERCIAL

## 2022-01-13 ENCOUNTER — TELEMEDICINE (OUTPATIENT)
Dept: INTERNAL MEDICINE CLINIC | Facility: CLINIC | Age: 67
End: 2022-01-13

## 2022-01-13 DIAGNOSIS — Z20.822 SUSPECTED COVID-19 VIRUS INFECTION: Primary | ICD-10-CM

## 2022-01-13 DIAGNOSIS — Z20.822 SUSPECTED COVID-19 VIRUS INFECTION: ICD-10-CM

## 2022-01-13 DIAGNOSIS — Z00.00 LABORATORY EXAMINATION ORDERED AS PART OF A ROUTINE GENERAL MEDICAL EXAMINATION: ICD-10-CM

## 2022-01-13 DIAGNOSIS — E11.9 DIABETES MELLITUS WITHOUT COMPLICATION (HCC): ICD-10-CM

## 2022-01-13 PROCEDURE — 99213 OFFICE O/P EST LOW 20 MIN: CPT | Performed by: FAMILY MEDICINE

## 2022-01-13 NOTE — PROGRESS NOTES
Virtual Video Check-In     This visit is conducted using Telemedicine with live, interactive video and audio.     Freddie Tony, who has verified his/her identification by name and , verbally consents to a Virtual/Telephone Check-In visit on  by mouth daily. 90 tablet 0   • methylPREDNISolone (MEDROL) 4 MG Oral Tablet Therapy Pack As directed. 1 each 0   • COLCHICINE 0.6 MG Oral Tab TAKE 1 TABLET (0.6 MG TOTAL) BY MOUTH ONCE DAILY.  90 tablet 0   • AMLODIPINE 5 MG Oral Tab TAKE 1 TABLET BY MOUTH Alcohol use: No    Drug use: No      Family History   Adopted: Yes   Problem Relation Age of Onset   • Heart Attack Father    • Heart Attack Mother         PE:  No vital signs were taken for this video visit.   GENERAL: well developed, well nourished, in no cannot complete a sentence or there is any blue discoloration to your lips go straight to the emergency room. - SARS-COV-2 BY PCR ([facility]); Future    2.  Laboratory examination ordered as part of a routine general medical examination  - schedule Px for

## 2022-01-14 ENCOUNTER — APPOINTMENT (OUTPATIENT)
Dept: PHYSICAL THERAPY | Facility: HOSPITAL | Age: 67
End: 2022-01-14
Attending: PHYSICAL MEDICINE & REHABILITATION

## 2022-01-14 LAB — SARS-COV-2 RNA RESP QL NAA+PROBE: DETECTED

## 2022-01-18 ENCOUNTER — APPOINTMENT (OUTPATIENT)
Dept: PHYSICAL THERAPY | Facility: HOSPITAL | Age: 67
End: 2022-01-18
Attending: PHYSICAL MEDICINE & REHABILITATION

## 2022-01-19 DIAGNOSIS — M10.9 GOUTY ARTHROPATHY: ICD-10-CM

## 2022-01-20 DIAGNOSIS — I10 ESSENTIAL HYPERTENSION: ICD-10-CM

## 2022-01-20 RX ORDER — COLCHICINE 0.6 MG/1
TABLET ORAL
Qty: 90 TABLET | Refills: 0 | Status: SHIPPED | OUTPATIENT
Start: 2022-01-20

## 2022-01-20 RX ORDER — AMLODIPINE BESYLATE 5 MG/1
TABLET ORAL
Qty: 90 TABLET | Refills: 0 | Status: SHIPPED | OUTPATIENT
Start: 2022-01-20

## 2022-01-25 RX ORDER — METFORMIN HYDROCHLORIDE 500 MG/1
1000 TABLET, EXTENDED RELEASE ORAL
Qty: 180 TABLET | Refills: 0 | Status: SHIPPED | OUTPATIENT
Start: 2022-01-25

## 2022-01-25 NOTE — TELEPHONE ENCOUNTER
Name from pharmacy: METFORMIN HCL ER [address]          Will file in chart as: METFORMIN HCL  MG Oral Tablet 24 Hr    Sig: Take 2 tablets (1,000 mg total) by mouth once daily.     Disp:  180 tablet    Refills:  0 (Pharmacy requested: Not specified

## 2022-02-01 ENCOUNTER — TELEPHONE (OUTPATIENT)
Dept: INTERNAL MEDICINE CLINIC | Facility: CLINIC | Age: 67
End: 2022-02-01

## 2022-02-01 NOTE — TELEPHONE ENCOUNTER
Pt friend Radha Peña dropped off parking placard form to be completed. Placed in [employer].      Per Radha Peña, either call her at [phone] or pt to  form

## 2022-02-01 NOTE — TELEPHONE ENCOUNTER
Spoke to pt to notify form is ready for . Copy placed to scan, other copy in accordion above sink. Placed pt original at  accordion for .

## 2022-02-04 ENCOUNTER — TELEPHONE (OUTPATIENT)
Dept: PULMONOLOGY | Facility: CLINIC | Age: 67
End: 2022-02-04

## 2022-02-08 ENCOUNTER — OFFICE VISIT (OUTPATIENT)
Dept: INTERNAL MEDICINE CLINIC | Facility: CLINIC | Age: 67
End: 2022-02-08
Payer: COMMERCIAL

## 2022-02-08 VITALS
BODY MASS INDEX: 45.16 KG/M2 | DIASTOLIC BLOOD PRESSURE: 78 MMHG | WEIGHT: 281 LBS | HEIGHT: 66 IN | RESPIRATION RATE: 20 BRPM | SYSTOLIC BLOOD PRESSURE: 130 MMHG | HEART RATE: 82 BPM | TEMPERATURE: 99 F

## 2022-02-08 DIAGNOSIS — G89.29 CHRONIC BILATERAL LOW BACK PAIN WITH SCIATICA, SCIATICA LATERALITY UNSPECIFIED: Primary | ICD-10-CM

## 2022-02-08 DIAGNOSIS — M54.40 CHRONIC BILATERAL LOW BACK PAIN WITH SCIATICA, SCIATICA LATERALITY UNSPECIFIED: Primary | ICD-10-CM

## 2022-02-08 PROCEDURE — 3075F SYST BP GE 130 - 139MM HG: CPT | Performed by: FAMILY MEDICINE

## 2022-02-08 PROCEDURE — 3008F BODY MASS INDEX DOCD: CPT | Performed by: FAMILY MEDICINE

## 2022-02-08 PROCEDURE — 99213 OFFICE O/P EST LOW 20 MIN: CPT | Performed by: FAMILY MEDICINE

## 2022-02-08 PROCEDURE — 3078F DIAST BP <80 MM HG: CPT | Performed by: FAMILY MEDICINE

## 2022-02-08 RX ORDER — NAPROXEN 500 MG/1
1 TABLET ORAL EVERY 12 HOURS
COMMUNITY
Start: 2022-01-11

## 2022-02-08 RX ORDER — PREDNISONE 10 MG/1
TABLET ORAL
Qty: 60 TABLET | Refills: 0 | Status: SHIPPED | OUTPATIENT
Start: 2022-02-08

## 2022-02-09 RX ORDER — OLMESARTAN MEDOXOMIL 20 MG/1
TABLET ORAL
Qty: 90 TABLET | Refills: 0 | Status: SHIPPED | OUTPATIENT
Start: 2022-02-09

## 2022-02-09 NOTE — TELEPHONE ENCOUNTER
Olmesartan 20 mg failed protocol due to  Hypertension Medications Protocol Failed 02/09/2022 12:20 AM   Protocol Details  CMP or BMP in past 12 months   Filled 11-17-21  Qty 90  0 refills  No upcoming appt.    LOV 2-8-21

## 2022-02-10 ENCOUNTER — PATIENT MESSAGE (OUTPATIENT)
Dept: INTERNAL MEDICINE CLINIC | Facility: CLINIC | Age: 67
End: 2022-02-10

## 2022-02-10 NOTE — TELEPHONE ENCOUNTER
From: Florecita Delatorre  To: SHENA Madrid  Sent: 2/10/2022 3:20 AM CST  Subject: Sciatica    Good Morning,   I have an Update on what is going on, The MD at [facility] according to his office does not fill out those forms, My Mistake . The Chiropractor took 4 X-rays and they feel I have a Tilted Pelvis. Ummm, I will know more tonight when they want to discuss how to go about fixing it as well as review the films again. I am wondering that if this the case, could the Hip Replacements be one of the Reasons for this condition ? It was 8 yrs last Month hopefully yhis will be over soon.

## 2022-02-21 ENCOUNTER — OFFICE VISIT (OUTPATIENT)
Dept: INTERNAL MEDICINE CLINIC | Facility: CLINIC | Age: 67
End: 2022-02-21
Payer: COMMERCIAL

## 2022-02-21 VITALS
WEIGHT: 272.63 LBS | RESPIRATION RATE: 18 BRPM | BODY MASS INDEX: 43.81 KG/M2 | HEIGHT: 66 IN | TEMPERATURE: 98 F | HEART RATE: 104 BPM | DIASTOLIC BLOOD PRESSURE: 106 MMHG | SYSTOLIC BLOOD PRESSURE: 160 MMHG | OXYGEN SATURATION: 98 %

## 2022-02-21 DIAGNOSIS — Z12.5 SPECIAL SCREENING FOR MALIGNANT NEOPLASM OF PROSTATE: ICD-10-CM

## 2022-02-21 DIAGNOSIS — S76.011A PSOAS MUSCLE STRAIN, RIGHT, INITIAL ENCOUNTER: ICD-10-CM

## 2022-02-21 DIAGNOSIS — G89.29 CHRONIC BILATERAL LOW BACK PAIN WITH SCIATICA, SCIATICA LATERALITY UNSPECIFIED: Primary | ICD-10-CM

## 2022-02-21 DIAGNOSIS — Z00.00 LABORATORY EXAMINATION ORDERED AS PART OF A ROUTINE GENERAL MEDICAL EXAMINATION: ICD-10-CM

## 2022-02-21 DIAGNOSIS — M54.40 CHRONIC BILATERAL LOW BACK PAIN WITH SCIATICA, SCIATICA LATERALITY UNSPECIFIED: Primary | ICD-10-CM

## 2022-02-21 PROCEDURE — 3080F DIAST BP >= 90 MM HG: CPT | Performed by: FAMILY MEDICINE

## 2022-02-21 PROCEDURE — 3077F SYST BP >= 140 MM HG: CPT | Performed by: FAMILY MEDICINE

## 2022-02-21 PROCEDURE — 3008F BODY MASS INDEX DOCD: CPT | Performed by: FAMILY MEDICINE

## 2022-02-21 PROCEDURE — 99213 OFFICE O/P EST LOW 20 MIN: CPT | Performed by: FAMILY MEDICINE

## 2022-02-28 ENCOUNTER — OFFICE VISIT (OUTPATIENT)
Dept: INTERNAL MEDICINE CLINIC | Facility: CLINIC | Age: 67
End: 2022-02-28
Payer: COMMERCIAL

## 2022-02-28 VITALS
OXYGEN SATURATION: 97 % | TEMPERATURE: 97 F | SYSTOLIC BLOOD PRESSURE: 142 MMHG | DIASTOLIC BLOOD PRESSURE: 82 MMHG | RESPIRATION RATE: 16 BRPM | BODY MASS INDEX: 46.45 KG/M2 | HEIGHT: 66 IN | HEART RATE: 90 BPM | WEIGHT: 289 LBS

## 2022-02-28 DIAGNOSIS — G89.29 CHRONIC BILATERAL LOW BACK PAIN WITH SCIATICA, SCIATICA LATERALITY UNSPECIFIED: Primary | ICD-10-CM

## 2022-02-28 DIAGNOSIS — S76.011A PSOAS MUSCLE STRAIN, RIGHT, INITIAL ENCOUNTER: ICD-10-CM

## 2022-02-28 DIAGNOSIS — M54.16 LUMBAR RADICULOPATHY: ICD-10-CM

## 2022-02-28 DIAGNOSIS — M54.40 CHRONIC BILATERAL LOW BACK PAIN WITH SCIATICA, SCIATICA LATERALITY UNSPECIFIED: Primary | ICD-10-CM

## 2022-02-28 PROCEDURE — 3077F SYST BP >= 140 MM HG: CPT | Performed by: FAMILY MEDICINE

## 2022-02-28 PROCEDURE — 3079F DIAST BP 80-89 MM HG: CPT | Performed by: FAMILY MEDICINE

## 2022-02-28 PROCEDURE — 3008F BODY MASS INDEX DOCD: CPT | Performed by: FAMILY MEDICINE

## 2022-02-28 PROCEDURE — 99214 OFFICE O/P EST MOD 30 MIN: CPT | Performed by: FAMILY MEDICINE

## 2022-03-15 ENCOUNTER — TELEMEDICINE (OUTPATIENT)
Dept: INTERNAL MEDICINE CLINIC | Facility: CLINIC | Age: 67
End: 2022-03-15

## 2022-03-15 DIAGNOSIS — S76.011A PSOAS MUSCLE STRAIN, RIGHT, INITIAL ENCOUNTER: ICD-10-CM

## 2022-03-15 DIAGNOSIS — M10.9 GOUTY ARTHROPATHY: Primary | ICD-10-CM

## 2022-03-15 DIAGNOSIS — M54.16 LUMBAR RADICULOPATHY: ICD-10-CM

## 2022-03-15 DIAGNOSIS — M54.40 CHRONIC BILATERAL LOW BACK PAIN WITH SCIATICA, SCIATICA LATERALITY UNSPECIFIED: ICD-10-CM

## 2022-03-15 DIAGNOSIS — G89.29 CHRONIC BILATERAL LOW BACK PAIN WITH SCIATICA, SCIATICA LATERALITY UNSPECIFIED: ICD-10-CM

## 2022-03-15 PROCEDURE — 99213 OFFICE O/P EST LOW 20 MIN: CPT | Performed by: FAMILY MEDICINE

## 2022-03-15 RX ORDER — PREDNISONE 20 MG/1
TABLET ORAL
Qty: 10 TABLET | Refills: 0 | Status: SHIPPED | OUTPATIENT
Start: 2022-03-15

## 2022-03-16 ENCOUNTER — HOSPITAL ENCOUNTER (OUTPATIENT)
Age: 67
Discharge: HOME | End: 2022-03-16
Payer: COMMERCIAL

## 2022-03-16 VITALS
BODY MASS INDEX: 43.16 KG/M2 | TEMPERATURE: 98 F | WEIGHT: 275 LBS | RESPIRATION RATE: 20 BRPM | DIASTOLIC BLOOD PRESSURE: 67 MMHG | SYSTOLIC BLOOD PRESSURE: 152 MMHG | HEIGHT: 67 IN | OXYGEN SATURATION: 96 % | HEART RATE: 77 BPM

## 2022-03-16 DIAGNOSIS — M70.22 OLECRANON BURSITIS OF LEFT ELBOW: Primary | ICD-10-CM

## 2022-03-16 DIAGNOSIS — L03.114 CELLULITIS OF LEFT UPPER EXTREMITY: ICD-10-CM

## 2022-03-16 PROCEDURE — 99213 OFFICE O/P EST LOW 20 MIN: CPT | Performed by: NURSE PRACTITIONER

## 2022-03-16 RX ORDER — CEFADROXIL 500 MG/1
500 CAPSULE ORAL 2 TIMES DAILY
Qty: 20 CAPSULE | Refills: 0 | Status: SHIPPED | OUTPATIENT
Start: 2022-03-16 | End: 2022-03-26

## 2022-03-19 ENCOUNTER — PATIENT MESSAGE (OUTPATIENT)
Dept: INTERNAL MEDICINE CLINIC | Facility: CLINIC | Age: 67
End: 2022-03-19

## 2022-03-21 NOTE — TELEPHONE ENCOUNTER
From: Larisa Arroyo  To: Nila Townsend APRCHRIS  Sent: 3/19/2022 9:17 PM CDT  Subject: Return to work    Morning,   I am hoping that the progressI am making is pleasing, I made the appointment with Dr. Jelena Baltazar, but unfortunately I won't be able to consult with him until April 26th. Even if i have great improvementi still intend to keep this appointment. I will need an updated return to work note from you from 03/07/22 until hopefully 04/12/2022. Between the P/T Keven Dee and the Chiropractor Dr. Garth Vázquez I feel that goal will be met if not sooner. If at all possible could I please pick the note up myself or have Jackson Kelley get it for me, I have to have this updated information in by Monday 03/21/2022 being they have me scheduled to return to work on the 22nd, and I am not totally off the cane just yet. Again, as soon as we get this out of the way, I will be going in for all the test that need to be done.  Thank You again  [facility]

## 2022-03-22 ENCOUNTER — TELEPHONE (OUTPATIENT)
Dept: INTERNAL MEDICINE CLINIC | Facility: CLINIC | Age: 67
End: 2022-03-22

## 2022-04-04 ENCOUNTER — HOSPITAL ENCOUNTER (OUTPATIENT)
Dept: GENERAL RADIOLOGY | Age: 67
Discharge: HOME | End: 2022-04-04
Attending: FAMILY MEDICINE
Payer: COMMERCIAL

## 2022-04-04 ENCOUNTER — PATIENT MESSAGE (OUTPATIENT)
Dept: INTERNAL MEDICINE CLINIC | Facility: CLINIC | Age: 67
End: 2022-04-04

## 2022-04-04 DIAGNOSIS — G89.29 CHRONIC BILATERAL LOW BACK PAIN WITH SCIATICA, SCIATICA LATERALITY UNSPECIFIED: ICD-10-CM

## 2022-04-04 DIAGNOSIS — M54.16 LUMBAR RADICULOPATHY: ICD-10-CM

## 2022-04-04 DIAGNOSIS — M54.40 CHRONIC BILATERAL LOW BACK PAIN WITH SCIATICA, SCIATICA LATERALITY UNSPECIFIED: ICD-10-CM

## 2022-04-04 PROCEDURE — 72110 X-RAY EXAM L-2 SPINE 4/>VWS: CPT | Performed by: FAMILY MEDICINE

## 2022-04-05 ENCOUNTER — OFFICE VISIT (OUTPATIENT)
Dept: SURGERY | Facility: CLINIC | Age: 67
End: 2022-04-05
Payer: COMMERCIAL

## 2022-04-05 VITALS
WEIGHT: 275 LBS | BODY MASS INDEX: 43.16 KG/M2 | SYSTOLIC BLOOD PRESSURE: 136 MMHG | DIASTOLIC BLOOD PRESSURE: 84 MMHG | HEIGHT: 67 IN

## 2022-04-05 DIAGNOSIS — M48.062 LUMBAR STENOSIS WITH NEUROGENIC CLAUDICATION: Primary | ICD-10-CM

## 2022-04-05 PROCEDURE — 3075F SYST BP GE 130 - 139MM HG: CPT | Performed by: NEUROLOGICAL SURGERY

## 2022-04-05 PROCEDURE — 3008F BODY MASS INDEX DOCD: CPT | Performed by: NEUROLOGICAL SURGERY

## 2022-04-05 PROCEDURE — 3079F DIAST BP 80-89 MM HG: CPT | Performed by: NEUROLOGICAL SURGERY

## 2022-04-05 PROCEDURE — 99203 OFFICE O/P NEW LOW 30 MIN: CPT | Performed by: NEUROLOGICAL SURGERY

## 2022-04-05 RX ORDER — DIAZEPAM 5 MG/1
5 TABLET ORAL SEE ADMIN INSTRUCTIONS
Qty: 2 TABLET | Refills: 0 | Status: SHIPPED | OUTPATIENT
Start: 2022-04-05

## 2022-04-05 NOTE — TELEPHONE ENCOUNTER
From: Cata Rangel  To: SHENA Chapman  Sent: 4/4/2022 7:13 PM CDT  Subject: Question regarding X-Ray Spine    In English, what does this mean ? Am I screwed for life now or is there a recovery process ? A non surgical process ?

## 2022-04-05 NOTE — PROGRESS NOTES
Having some hip stiffness  Had sciatica  Did have some back pain but doesn't have this anymore  Hard to get moving in the morning  Taking steps is difficult  Feels that he is getting better each day  Wonders if he thought he expected things to go faster than it is    No surgery on back, has had PATRICE hip replacement

## 2022-04-06 RX ORDER — CARVEDILOL 25 MG/1
TABLET ORAL
Qty: 180 TABLET | Refills: 0 | Status: SHIPPED | OUTPATIENT
Start: 2022-04-06

## 2022-04-06 NOTE — TELEPHONE ENCOUNTER
mychart message sent to pt reminding he is due for lab work     Carvedilol 25 mg   Hypertension Medications Protocol Failed 04/06/2022 12:01 AM   Protocol Details  CMP or BMP in past 12 months   Filled 1-11-22  Qty 180  0 refills  Upcoming appt.  4-18-22  LOV 2-8-22

## 2022-04-08 ENCOUNTER — HOSPITAL ENCOUNTER (OUTPATIENT)
Dept: MRI IMAGING | Age: 67
Discharge: HOME | End: 2022-04-08
Attending: NEUROLOGICAL SURGERY
Payer: COMMERCIAL

## 2022-04-08 ENCOUNTER — HOSPITAL ENCOUNTER (OUTPATIENT)
Age: 67
End: 2022-04-08
Payer: COMMERCIAL

## 2022-04-08 DIAGNOSIS — M48.062 LUMBAR STENOSIS WITH NEUROGENIC CLAUDICATION: ICD-10-CM

## 2022-04-15 ENCOUNTER — OFFICE VISIT (OUTPATIENT)
Dept: SURGERY | Facility: CLINIC | Age: 67
End: 2022-04-15
Payer: COMMERCIAL

## 2022-04-15 ENCOUNTER — TELEPHONE (OUTPATIENT)
Dept: SURGERY | Facility: CLINIC | Age: 67
End: 2022-04-15

## 2022-04-15 VITALS
SYSTOLIC BLOOD PRESSURE: 150 MMHG | HEIGHT: 67 IN | BODY MASS INDEX: 43.16 KG/M2 | DIASTOLIC BLOOD PRESSURE: 90 MMHG | WEIGHT: 275 LBS

## 2022-04-15 DIAGNOSIS — M48.062 LUMBAR STENOSIS WITH NEUROGENIC CLAUDICATION: Primary | ICD-10-CM

## 2022-04-15 PROCEDURE — 99212 OFFICE O/P EST SF 10 MIN: CPT | Performed by: PHYSICIAN ASSISTANT

## 2022-04-15 PROCEDURE — 3008F BODY MASS INDEX DOCD: CPT | Performed by: PHYSICIAN ASSISTANT

## 2022-04-15 PROCEDURE — 3077F SYST BP >= 140 MM HG: CPT | Performed by: PHYSICIAN ASSISTANT

## 2022-04-15 PROCEDURE — 3080F DIAST BP >= 90 MM HG: CPT | Performed by: PHYSICIAN ASSISTANT

## 2022-04-15 NOTE — PROGRESS NOTES
Had imaging done outside of [facility] and is being uploaded  Here to review information

## 2022-04-15 NOTE — TELEPHONE ENCOUNTER
Pt brought in MRI lumbar spine disc without contrast from [facility]. Downloaded to [employer]. Disc returned to pt at check out desk.

## 2022-04-18 ENCOUNTER — PATIENT MESSAGE (OUTPATIENT)
Dept: PAIN CLINIC | Facility: CLINIC | Age: 67
End: 2022-04-18

## 2022-04-18 ENCOUNTER — OFFICE VISIT (OUTPATIENT)
Dept: PAIN CLINIC | Facility: CLINIC | Age: 67
End: 2022-04-18
Payer: COMMERCIAL

## 2022-04-18 VITALS
OXYGEN SATURATION: 98 % | WEIGHT: 275 LBS | BODY MASS INDEX: 43 KG/M2 | SYSTOLIC BLOOD PRESSURE: 130 MMHG | DIASTOLIC BLOOD PRESSURE: 80 MMHG | HEART RATE: 72 BPM

## 2022-04-18 DIAGNOSIS — M48.062 SPINAL STENOSIS OF LUMBAR REGION WITH NEUROGENIC CLAUDICATION: Primary | ICD-10-CM

## 2022-04-18 DIAGNOSIS — E66.01 CLASS 3 SEVERE OBESITY DUE TO EXCESS CALORIES IN ADULT, UNSPECIFIED BMI, UNSPECIFIED WHETHER SERIOUS COMORBIDITY PRESENT (HCC): ICD-10-CM

## 2022-04-18 PROCEDURE — 99214 OFFICE O/P EST MOD 30 MIN: CPT | Performed by: NURSE PRACTITIONER

## 2022-04-18 PROCEDURE — 3075F SYST BP GE 130 - 139MM HG: CPT | Performed by: NURSE PRACTITIONER

## 2022-04-18 PROCEDURE — 3079F DIAST BP 80-89 MM HG: CPT | Performed by: NURSE PRACTITIONER

## 2022-04-19 ENCOUNTER — TELEPHONE (OUTPATIENT)
Dept: NEUROLOGY | Facility: CLINIC | Age: 67
End: 2022-04-19

## 2022-04-19 NOTE — TELEPHONE ENCOUNTER
Initiated PA with [facility] for [address] [place] [address]  / Uploaded clinical documentation / Pending case # [id number]

## 2022-04-19 NOTE — TELEPHONE ENCOUNTER
From: Shivam Ramirez  To: SHENA Owens  Sent: 4/18/2022 5:14 PM CDT  Subject: Maria Del Carmen Gould,   May I still visit my Chiropractor for my twice weekly visits or should I put those on hold ? I forgot to ask while I was in the office. Thank You.

## 2022-04-21 DIAGNOSIS — I10 ESSENTIAL HYPERTENSION: ICD-10-CM

## 2022-04-21 RX ORDER — AMLODIPINE BESYLATE 5 MG/1
5 TABLET ORAL DAILY
Qty: 90 TABLET | Refills: 0 | Status: SHIPPED | OUTPATIENT
Start: 2022-04-21 | End: 2022-07-05

## 2022-04-22 NOTE — TELEPHONE ENCOUNTER
Question Answer   Anesthesia Type Sedation   Provider Jennifer Moe   [facility]   Procedure Lumbar ADRIANNA   Medical clearance requested (will send to Pain Navigator) No   Patient has Medicare coverage?  No   Comments (Please list entire procedure name here.) L5/S1 ADRIANNA

## 2022-04-22 NOTE — TELEPHONE ENCOUNTER
Procedure Name: Lumbar ADRIANNA   CPT Code(s): O2658074    The procedure has been DENIED. Plan/3rd party: [facility]   Physician: Dr. Renetta Zambrano   Case/Reference #: [id number]  P2P Phone #: [phone]  Number of days to complete P2P: 14 Calendar days    Reason for denial:  Based on [facility] (CPB) Number 0016: Back Pain-Invasive Procedures, we  cannot approve this request. This service is supported when you have pain that follows the  dermatome pattern of nerves at the requested treatment level of your spine. A dermatome is a body  region that is supplied by a single spinal nerve. Your records show that: 1) you do not have pain that  travels from your spine, or 2) your pain does not match the dermatome pattern for the level of your  spine that your doctor has requested to treat. We have told your doctor about this.

## 2022-04-22 NOTE — TELEPHONE ENCOUNTER
Case/Reference #: [id number]    Contacted [facility] to schedule peer to peer. Spoke to Sharmila RN who notes that this was denied for lack of radiculopathy in a dermatomal pattern noted. Winifred Espino reviews the notes that have been provided and finds documentation of right sided weakness 4/5 and positive SLR.  Winifred Espino states she will add this information to the system and states she will be able to gain approval.     Kristina Shaikh #: [id number]  Valid Dates: 4/22/22-10/19/22

## 2022-04-22 NOTE — TELEPHONE ENCOUNTER
Prior authorization request completed for: BRETT  Authorization # [id number]  Authorization dates: 4/22/2022 - 10/19/2022  CPT codes approved: 52435  Number of visits/dates of service approved: 1  Physician: Dr. Nancy Pope   Location: THE [facility]       Patient can be scheduled. Routed to Navigator.

## 2022-04-25 ENCOUNTER — LAB ENCOUNTER (OUTPATIENT)
Dept: LAB | Age: 67
End: 2022-04-25
Attending: ANESTHESIOLOGY
Payer: COMMERCIAL

## 2022-04-25 DIAGNOSIS — M48.062 SPINAL STENOSIS OF LUMBAR REGION WITH NEUROGENIC CLAUDICATION: ICD-10-CM

## 2022-04-26 LAB — SARS-COV-2 RNA RESP QL NAA+PROBE: NOT DETECTED

## 2022-04-28 ENCOUNTER — HOSPITAL ENCOUNTER (OUTPATIENT)
Facility: HOSPITAL | Age: 67
Setting detail: HOSPITAL OUTPATIENT SURGERY
Discharge: HOME | End: 2022-04-28
Attending: ANESTHESIOLOGY | Admitting: ANESTHESIOLOGY
Payer: COMMERCIAL

## 2022-04-28 ENCOUNTER — APPOINTMENT (OUTPATIENT)
Dept: GENERAL RADIOLOGY | Facility: HOSPITAL | Age: 67
End: 2022-04-28
Attending: ANESTHESIOLOGY
Payer: COMMERCIAL

## 2022-04-28 VITALS
SYSTOLIC BLOOD PRESSURE: 137 MMHG | HEART RATE: 70 BPM | TEMPERATURE: 97 F | BODY MASS INDEX: 43.16 KG/M2 | DIASTOLIC BLOOD PRESSURE: 77 MMHG | HEIGHT: 67 IN | RESPIRATION RATE: 18 BRPM | OXYGEN SATURATION: 97 % | WEIGHT: 275 LBS

## 2022-04-28 DIAGNOSIS — M48.062 SPINAL STENOSIS OF LUMBAR REGION WITH NEUROGENIC CLAUDICATION: ICD-10-CM

## 2022-04-28 LAB — GLUCOSE BLD-MCNC: 147 MG/DL (ref 70–99)

## 2022-04-28 PROCEDURE — 3E0S33Z INTRODUCTION OF ANTI-INFLAMMATORY INTO EPIDURAL SPACE, PERCUTANEOUS APPROACH: ICD-10-PCS | Performed by: ANESTHESIOLOGY

## 2022-04-28 PROCEDURE — 82962 GLUCOSE BLOOD TEST: CPT

## 2022-04-28 RX ORDER — INSULIN ASPART 100 [IU]/ML
3 INJECTION, SOLUTION INTRAVENOUS; SUBCUTANEOUS ONCE
Status: DISCONTINUED | OUTPATIENT
Start: 2022-04-28 | End: 2022-04-28

## 2022-04-28 RX ORDER — DEXTROSE MONOHYDRATE 25 G/50ML
50 INJECTION, SOLUTION INTRAVENOUS
Status: DISCONTINUED | OUTPATIENT
Start: 2022-04-28 | End: 2022-04-28

## 2022-04-28 RX ORDER — NICOTINE POLACRILEX 4 MG
15 LOZENGE BUCCAL
Status: DISCONTINUED | OUTPATIENT
Start: 2022-04-28 | End: 2022-04-28

## 2022-04-28 RX ORDER — DEXAMETHASONE SODIUM PHOSPHATE 10 MG/ML
INJECTION, SOLUTION INTRAMUSCULAR; INTRAVENOUS
Status: DISCONTINUED | OUTPATIENT
Start: 2022-04-28 | End: 2022-04-28

## 2022-04-28 RX ORDER — LIDOCAINE HYDROCHLORIDE 10 MG/ML
INJECTION, SOLUTION EPIDURAL; INFILTRATION; INTRACAUDAL; PERINEURAL
Status: DISCONTINUED | OUTPATIENT
Start: 2022-04-28 | End: 2022-04-28

## 2022-04-28 RX ORDER — NICOTINE POLACRILEX 4 MG
30 LOZENGE BUCCAL
Status: DISCONTINUED | OUTPATIENT
Start: 2022-04-28 | End: 2022-04-28

## 2022-04-28 RX ORDER — ONDANSETRON 2 MG/ML
4 INJECTION INTRAMUSCULAR; INTRAVENOUS ONCE AS NEEDED
Status: DISCONTINUED | OUTPATIENT
Start: 2022-04-28 | End: 2022-04-28

## 2022-04-28 RX ORDER — METFORMIN HYDROCHLORIDE 500 MG/1
1000 TABLET, EXTENDED RELEASE ORAL
Qty: 60 TABLET | Refills: 0 | Status: SHIPPED | OUTPATIENT
Start: 2022-04-28 | End: 2022-06-02

## 2022-04-28 RX ORDER — DIPHENHYDRAMINE HYDROCHLORIDE 50 MG/ML
50 INJECTION INTRAMUSCULAR; INTRAVENOUS ONCE AS NEEDED
Status: DISCONTINUED | OUTPATIENT
Start: 2022-04-28 | End: 2022-04-28

## 2022-04-28 RX ORDER — SODIUM CHLORIDE 9 MG/ML
INJECTION INTRAVENOUS
Status: DISCONTINUED | OUTPATIENT
Start: 2022-04-28 | End: 2022-04-28

## 2022-04-28 RX ORDER — SODIUM CHLORIDE, SODIUM LACTATE, POTASSIUM CHLORIDE, CALCIUM CHLORIDE 600; 310; 30; 20 MG/100ML; MG/100ML; MG/100ML; MG/100ML
100 INJECTION, SOLUTION INTRAVENOUS CONTINUOUS
Status: DISCONTINUED | OUTPATIENT
Start: 2022-04-28 | End: 2022-04-28

## 2022-04-28 NOTE — TELEPHONE ENCOUNTER
Pt made aware twice on Last refills that he needed to come in for CPX+ Lab work. Metformin HCL  mg  Diabetic Medication Protocol Failed 04/28/2022 12:01 AM   Protocol Details  HgBA1C procedure resulted in past 6 months    Last HgBA1C < 7.5   Filled 1-25-22  Qty 180  0 refills  No upcoming appt. Telemed.  1-13-22

## 2022-04-28 NOTE — OPERATIVE REPORT
[facility]  Operative Report  2022     Keyur Leal Patient Status:  Hospital Outpatient Surgery    3/18/1955 MRN [id number]   Location [facility] CENTER Attending Alannah Hicks MD   Hosp Day # 0 PCP Melissa Alas MD     Indication: Bertha Marmolejo is a 74year old male with lumbar spinal stenosis    Preoperative Diagnosis:  Spinal stenosis of lumbar region with neurogenic claudication [M48.062]    Postoperative Diagnosis: Same as above. Procedure performed: LUMBAR INTERLAMINAR EPIDURAL INJECTION WITH LOCAL    Anesthesia: Local    EBL: Less than 1 ml. Procedure Description:  After reviewing the patient's history and performing a focused physical examination, the diagnosis and positive previous diagnostic tests were confirmed and contraindications such as infection and coagulopathy were ruled out. Following review of allergies and potential side effects and complications, including but not necessarily limited to infection, allergic reaction, local tissue breakdown, nerve injury, post-dural puncture headache and paresis, the patient consented for the procedure. The patient was brought to the procedure room in prone position. After sterile prep lumbar spine, the L5-S1 interspace was identified with the help of fluoroscopy. Local anesthetic was given by a 25 gauge 1.5 inch needle with 1% lidocaine in that space level. Thereafter, a 20 gauge Tuohy needle was introduced and advanced under fluoroscopy. The epidural space was accessed by using loss of resistance to air technique. The needle position was confirmed with AP and lateral view under fluoroscopy. Omnipaque 180 was injected in 1 mL volume. A good epidurogram was obtained. Thereafter, dexamethasone 10 mg with normal saline of 5 mL in total volume of 6 mL was injected through the Tuohy needle. The needle was flushed with 1 mL lidocaine. The needle was withdrawn with the stylet intact in situ.   The needle's tip was intact. The patient tolerated the procedure very well without significant immediate complication. The patient's back was cleaned and sterile dressing was applied. The patient was discharged with an instruction to a responsible adult after discharge criteria were met. The patient was advised to contact us should any problems happen. The patient was also informed to go to the emergency room immediately if experiencing severe numbness or weakness in the extremities or experiencing bowel or bladder incontinence. Complications: None. Follow up: The patient was followed in the pain clinic as needed basis.           Hieu Ramos MD

## 2022-05-08 RX ORDER — OLMESARTAN MEDOXOMIL 20 MG/1
20 TABLET ORAL DAILY
Qty: 30 TABLET | Refills: 0 | Status: SHIPPED | OUTPATIENT
Start: 2022-05-08 | End: 2022-06-02

## 2022-05-09 ENCOUNTER — OFFICE VISIT (OUTPATIENT)
Dept: PAIN CLINIC | Facility: CLINIC | Age: 67
End: 2022-05-09
Payer: COMMERCIAL

## 2022-05-09 ENCOUNTER — TELEPHONE (OUTPATIENT)
Dept: PAIN CLINIC | Facility: CLINIC | Age: 67
End: 2022-05-09

## 2022-05-09 VITALS — OXYGEN SATURATION: 97 % | DIASTOLIC BLOOD PRESSURE: 82 MMHG | HEART RATE: 71 BPM | SYSTOLIC BLOOD PRESSURE: 142 MMHG

## 2022-05-09 DIAGNOSIS — M48.062 SPINAL STENOSIS OF LUMBAR REGION WITH NEUROGENIC CLAUDICATION: Primary | ICD-10-CM

## 2022-05-09 PROCEDURE — 99213 OFFICE O/P EST LOW 20 MIN: CPT | Performed by: NURSE PRACTITIONER

## 2022-05-09 PROCEDURE — 3077F SYST BP >= 140 MM HG: CPT | Performed by: NURSE PRACTITIONER

## 2022-05-09 PROCEDURE — 3079F DIAST BP 80-89 MM HG: CPT | Performed by: NURSE PRACTITIONER

## 2022-05-09 NOTE — TELEPHONE ENCOUNTER
----- Message from SHENA Byers sent at 5/9/2022 12:05 PM CDT -----  Can you pend a letter acknowledging patient seen today, requiring ongoing treatment and will need 3 more weeks to obtain auth for treatment, have treatment and follow up in office to determine if still using assistive device prior to going back to work?   Thanks  citlalli

## 2022-05-09 NOTE — PROGRESS NOTES
Last procedure: LUMBAR INTERLAMINAR EPIDURAL INJECTION WITH LOCAL  Date: 4/28/22  Percentage of relief obtained: 70%  Duration of relief: 10 days, pain returned today    Current Pain Score: tightness in right leg

## 2022-05-10 ENCOUNTER — TELEPHONE (OUTPATIENT)
Dept: NEUROLOGY | Facility: CLINIC | Age: 67
End: 2022-05-10

## 2022-05-10 DIAGNOSIS — M51.36 DDD (DEGENERATIVE DISC DISEASE), LUMBAR: Primary | ICD-10-CM

## 2022-05-16 ENCOUNTER — PATIENT MESSAGE (OUTPATIENT)
Dept: PAIN CLINIC | Facility: CLINIC | Age: 67
End: 2022-05-16

## 2022-05-16 NOTE — TELEPHONE ENCOUNTER
Procedure Name: BRETT   CPT Code(s): 42091    The procedure has been DENIED. Plan/3rd party: Stoney Sharpe   Physician: Dr. Wicho Terrazas   Case/Reference #: [id number]  P2P Phone #: [phone]  Number of days to complete P2P: 14 Calendar days   Reason for denial:     Based on [address] (CPB) number 0016: Back pain-invasive porcedures, we cannot approve this request. The requested repeat service is supported if your last shot showed two of the following for at least two weeks: 1) your pain was reduced by at least 50% 2) your level of function increased, and/or 3) you were able to decrease the use of medicine and/or therapy. Your records do not show at least two of these.

## 2022-05-16 NOTE — TELEPHONE ENCOUNTER
Case/Reference #: [id number]    Contacted [facility] to schedule peer to peer. Spoke to Tahmina MONTENEGRO to schedule. Scheduled for 5/17/22 at 1:45 with Dr. Osmar Jacob.

## 2022-05-16 NOTE — TELEPHONE ENCOUNTER
From: Brayan Trinidad  To: SHENA Reece  Sent: 5/16/2022 3:29 PM CDT  Subject: Insurance Forms    Hello [place] Rhein All is Well, I know we are waiting on Aena for a response, but I was wondering that when You have time if I could  the enclosed form as well as the other Insurance forms that were left on my last visit. I would like to get those submitted so the Insurance that I have been paying for all these years can be put to use for once. Thank You Again, I am doing what I can to make my situation or condition better while waiting, just hoping to get back to normal soon.     Conor Izaguirre

## 2022-05-17 ENCOUNTER — TELEPHONE (OUTPATIENT)
Dept: PAIN CLINIC | Facility: CLINIC | Age: 67
End: 2022-05-17

## 2022-05-17 ENCOUNTER — PATIENT MESSAGE (OUTPATIENT)
Dept: PAIN CLINIC | Facility: CLINIC | Age: 67
End: 2022-05-17

## 2022-05-17 ENCOUNTER — TELEPHONE (OUTPATIENT)
Dept: NEUROLOGY | Facility: CLINIC | Age: 67
End: 2022-05-17

## 2022-05-17 NOTE — TELEPHONE ENCOUNTER
SHENA Gann 31 minutes ago (1:51 PM)     Summary: [id number]       Received authorization for repeat lumbar epidural steroid injection authorization number and [id number]  Dr. Serenity Bowens indicated that the faxed confirmation with this number as well as the expiration date will be sent to the office

## 2022-05-17 NOTE — TELEPHONE ENCOUNTER
From: Brooke eMdina  Sent: 5/17/2022 9:03 AM CDT  To: SHENA Stauffer  Subject: Insurance Forms    ----- Message from Loan Spain RN sent at 5/17/2022 9:03 AM CDT -----  Should he see his PCP?      ----- Message from Matteo Smith to SHENA Guzman sent at 5/17/2022 9:00 AM -----   Morning,  I am working hard on these symptoms of tightness that I encounter in my hips. Pain is manageable yes, but I do have to have the Absence Certificate filled out to return to work being the company placed me on Extended Illness Status back in April. I figured that would be an issue with the Second Shot being its Nadja Dhillon as our insurance carrier, and I do have an appointment with the Surgeon, but not until mid July. I am shooting for a Jgune 5th date ti return, I am doing all I can with exercise to get better, but I do have to have the form filled out to return .       ----- Message -----   From:SHENA Feng   Sent:5/17/2022 7:57 AM CDT   To:Mustapha Lyon Necessary   Subject:Insurance Forms    Hello,  Unfortunately, your insurance is requiring that I complete a peer review to repeat the injection. I have not filled out any paperwork as I would encourage you to work and not be off, but work as tolerated and if you need to sit or use assistive device that should be allowed until your symptoms improve. I do not believe that someone could \"force\" you to be off work due to pain that results in you needing to use an assistive device.  Hopefully we can get the next injection approved, otherwise I will refer you to the spine surgery team.   Alex Brandt      ----- Message -----   From:Mustapha Lyon Necessary   Sent:5/16/2022 3:29 PM CDT   To:SHENA Feng   Subject:Insurance Forms    Rickeylashanda SevillaMckenzie She All is Well, I know we are waiting on [employer] for a response, but I was wondering that when You have time if I could  the enclosed form as well as the other Insurance forms that were left on my last visit. I would like to get those submitted so the Insurance that I have been paying for all these years can be put to use for once. Thank You Again, I am doing what I can to make my situation or condition better while waiting, just hoping to get back to normal soon.     Eva España

## 2022-05-17 NOTE — TELEPHONE ENCOUNTER
Received authorization for repeat lumbar epidural steroid injection authorization number and [id number]  Dr. Neha Stevenson indicated that the faxed confirmation with this number as well as the expiration date will be sent to the office

## 2022-05-17 NOTE — TELEPHONE ENCOUNTER
Prior authorization request completed for: BRETT # 2  Authorization # [id number]  Authorization dates: 5/17/2022 - 11/13/2022  CPT codes approved: 64678  Number of visits/dates of service approved: 1  Physician: Dr. Rodger Carlson   Location: THE [facility]    Patient can be scheduled. Routed to Navigator.

## 2022-05-17 NOTE — TELEPHONE ENCOUNTER
Prior authorization request completed for: BRETT # 2  Authorization # [id number]  Authorization dates: 5/17/2022 - 11/13/2022  CPT codes approved: 60468  Number of visits/dates of service approved: 1  Physician: Dr. Valaria Nageotte   Location: [facility]  Patient can be scheduled. Routed to Navigator.     Please disregard new TE

## 2022-05-24 ENCOUNTER — APPOINTMENT (OUTPATIENT)
Dept: GENERAL RADIOLOGY | Facility: HOSPITAL | Age: 67
End: 2022-05-24
Attending: ANESTHESIOLOGY
Payer: COMMERCIAL

## 2022-05-24 ENCOUNTER — HOSPITAL ENCOUNTER (OUTPATIENT)
Facility: HOSPITAL | Age: 67
Setting detail: HOSPITAL OUTPATIENT SURGERY
Discharge: HOME | End: 2022-05-24
Attending: ANESTHESIOLOGY | Admitting: ANESTHESIOLOGY
Payer: COMMERCIAL

## 2022-05-24 VITALS
BODY MASS INDEX: 43.16 KG/M2 | HEART RATE: 73 BPM | WEIGHT: 275 LBS | OXYGEN SATURATION: 97 % | TEMPERATURE: 98 F | DIASTOLIC BLOOD PRESSURE: 82 MMHG | HEIGHT: 67 IN | SYSTOLIC BLOOD PRESSURE: 147 MMHG | RESPIRATION RATE: 16 BRPM

## 2022-05-24 DIAGNOSIS — M51.36 DDD (DEGENERATIVE DISC DISEASE), LUMBAR: ICD-10-CM

## 2022-05-24 LAB — GLUCOSE BLD-MCNC: 143 MG/DL (ref 70–99)

## 2022-05-24 PROCEDURE — B01B1ZZ FLUOROSCOPY OF SPINAL CORD USING LOW OSMOLAR CONTRAST: ICD-10-PCS | Performed by: ANESTHESIOLOGY

## 2022-05-24 PROCEDURE — 3E0S33Z INTRODUCTION OF ANTI-INFLAMMATORY INTO EPIDURAL SPACE, PERCUTANEOUS APPROACH: ICD-10-PCS | Performed by: ANESTHESIOLOGY

## 2022-05-24 PROCEDURE — 62323 NJX INTERLAMINAR LMBR/SAC: CPT | Performed by: ANESTHESIOLOGY

## 2022-05-24 RX ORDER — SODIUM CHLORIDE, SODIUM LACTATE, POTASSIUM CHLORIDE, CALCIUM CHLORIDE 600; 310; 30; 20 MG/100ML; MG/100ML; MG/100ML; MG/100ML
100 INJECTION, SOLUTION INTRAVENOUS CONTINUOUS
Status: DISCONTINUED | OUTPATIENT
Start: 2022-05-24 | End: 2022-05-24

## 2022-05-24 RX ORDER — DEXTROSE MONOHYDRATE 25 G/50ML
50 INJECTION, SOLUTION INTRAVENOUS
Status: DISCONTINUED | OUTPATIENT
Start: 2022-05-24 | End: 2022-05-24

## 2022-05-24 RX ORDER — DIPHENHYDRAMINE HYDROCHLORIDE 50 MG/ML
50 INJECTION INTRAMUSCULAR; INTRAVENOUS ONCE AS NEEDED
Status: CANCELLED | OUTPATIENT
Start: 2022-05-24 | End: 2022-05-24

## 2022-05-24 RX ORDER — IBUPROFEN 200 MG
200 TABLET ORAL EVERY 6 HOURS PRN
COMMUNITY

## 2022-05-24 RX ORDER — ONDANSETRON 2 MG/ML
4 INJECTION INTRAMUSCULAR; INTRAVENOUS ONCE AS NEEDED
Status: CANCELLED | OUTPATIENT
Start: 2022-05-24 | End: 2022-05-24

## 2022-05-24 RX ORDER — DEXAMETHASONE SODIUM PHOSPHATE 10 MG/ML
INJECTION, SOLUTION INTRAMUSCULAR; INTRAVENOUS
Status: DISCONTINUED | OUTPATIENT
Start: 2022-05-24 | End: 2022-05-24

## 2022-05-24 RX ORDER — NICOTINE POLACRILEX 4 MG
15 LOZENGE BUCCAL
Status: DISCONTINUED | OUTPATIENT
Start: 2022-05-24 | End: 2022-05-24

## 2022-05-24 RX ORDER — SODIUM CHLORIDE 9 MG/ML
INJECTION INTRAVENOUS
Status: DISCONTINUED | OUTPATIENT
Start: 2022-05-24 | End: 2022-05-24

## 2022-05-24 RX ORDER — LIDOCAINE HYDROCHLORIDE 10 MG/ML
INJECTION, SOLUTION EPIDURAL; INFILTRATION; INTRACAUDAL; PERINEURAL
Status: DISCONTINUED | OUTPATIENT
Start: 2022-05-24 | End: 2022-05-24

## 2022-05-24 RX ORDER — ONDANSETRON 2 MG/ML
4 INJECTION INTRAMUSCULAR; INTRAVENOUS ONCE AS NEEDED
Status: DISCONTINUED | OUTPATIENT
Start: 2022-05-24 | End: 2022-05-24

## 2022-05-24 RX ORDER — NICOTINE POLACRILEX 4 MG
30 LOZENGE BUCCAL
Status: DISCONTINUED | OUTPATIENT
Start: 2022-05-24 | End: 2022-05-24

## 2022-05-24 RX ORDER — INSULIN ASPART 100 [IU]/ML
3 INJECTION, SOLUTION INTRAVENOUS; SUBCUTANEOUS ONCE
Status: DISCONTINUED | OUTPATIENT
Start: 2022-05-24 | End: 2022-05-24

## 2022-05-24 NOTE — OPERATIVE REPORT
[facility]  Operative Report  2022     Larisa Arroyo Patient Status:  Hospital Outpatient Surgery    3/18/1955 MRN [id number]   Location [facility] CENTER Attending Lauryn Martinez MD   Hosp Day # 0 PCP Franky Jackman MD     Indication: Johanne Melendez is a 74year old male with low back pain    Preoperative Diagnosis:  DDD (degenerative disc disease), lumbar [M51.36]    Postoperative Diagnosis: Same as above. Procedure performed: LUMBAR INTERLAMINAR EPIDURAL INJECTION with local     Anesthesia: Local     EBL: Less than 1 ml. Procedure Description:  After reviewing the patient's history and performing a focused physical examination, the diagnosis and positive previous diagnostic tests were confirmed and contraindications such as infection and coagulopathy were ruled out. Following review of allergies and potential side effects and complications, including but not necessarily limited to infection, allergic reaction, local tissue breakdown, nerve injury, post-dural puncture headache and paresis, the patient consented for the procedure. The patient was brought to the procedure room in prone position. After sterile prep lumbar spine, the L5-S1 interspace was identified with the help of fluoroscopy. Local anesthetic was given by a 25 gauge 1.5 inch needle with 1% lidocaine in that space level. Thereafter, a 20 gauge Tuohy needle was introduced and advanced under fluoroscopy. The epidural space was accessed by using loss of resistance to air technique. The needle position was confirmed with AP and lateral view under fluoroscopy. Omnipaque 180 was injected in 1 mL volume. A good epidurogram was obtained. Thereafter, dexamethasone 10 mg with normal saline of 5 mL in total volume of 6 mL was injected through the Tuohy needle. The needle was flushed with 1 mL lidocaine. The needle was withdrawn with the stylet intact in situ. The needle's tip was intact.   The patient tolerated the procedure very well without significant immediate complication. The patient's back was cleaned and sterile dressing was applied. The patient was discharged with an instruction to a responsible adult after discharge criteria were met. The patient was advised to contact us should any problems happen. The patient was also informed to go to the emergency room immediately if experiencing severe numbness or weakness in the extremities or experiencing bowel or bladder incontinence. Complications: None. Follow up: The patient was followed in the pain clinic as needed basis.           Denzel Guillermo MD

## 2022-05-31 ENCOUNTER — TELEPHONE (OUTPATIENT)
Dept: PAIN CLINIC | Facility: CLINIC | Age: 67
End: 2022-05-31

## 2022-05-31 ENCOUNTER — OFFICE VISIT (OUTPATIENT)
Dept: PAIN CLINIC | Facility: CLINIC | Age: 67
End: 2022-05-31
Payer: COMMERCIAL

## 2022-05-31 VITALS
HEIGHT: 67 IN | WEIGHT: 273 LBS | HEART RATE: 73 BPM | SYSTOLIC BLOOD PRESSURE: 128 MMHG | BODY MASS INDEX: 42.85 KG/M2 | DIASTOLIC BLOOD PRESSURE: 76 MMHG | OXYGEN SATURATION: 97 %

## 2022-05-31 DIAGNOSIS — M48.062 SPINAL STENOSIS OF LUMBAR REGION WITH NEUROGENIC CLAUDICATION: Primary | ICD-10-CM

## 2022-05-31 PROCEDURE — 3008F BODY MASS INDEX DOCD: CPT | Performed by: NURSE PRACTITIONER

## 2022-05-31 PROCEDURE — 99214 OFFICE O/P EST MOD 30 MIN: CPT | Performed by: NURSE PRACTITIONER

## 2022-05-31 PROCEDURE — 3074F SYST BP LT 130 MM HG: CPT | Performed by: NURSE PRACTITIONER

## 2022-05-31 PROCEDURE — 3078F DIAST BP <80 MM HG: CPT | Performed by: NURSE PRACTITIONER

## 2022-05-31 NOTE — TELEPHONE ENCOUNTER
----- Message from SHENA Schwarz sent at 5/31/2022  2:15 PM CDT -----  Regarding: note for work  Please provide a note for work for patient that includes that it is our recommendation that patient return to work and be allowed to modify and use an assistive device as needed, rest as needed if pain is too severe. If work will not allow patient to have modified ability and use of a cane then I respectfully ask them to outline what would be needed to allow patient to get back to work. If no modifications are allowed we would recommend that patient have his third treatment and follow up with the spine surgeon which is scheduled in July for further recommendations. I prefer not to have patient off work, but it would depend on his company restricting him.   Thank you,  Jennifer Brandt

## 2022-05-31 NOTE — PROGRESS NOTES
Patient presents in office today with reported pain in back pain    Current pain level reported = 4/10    Last reported dose of Tylenol after breakfast

## 2022-06-02 ENCOUNTER — OFFICE VISIT (OUTPATIENT)
Dept: INTERNAL MEDICINE CLINIC | Facility: CLINIC | Age: 67
End: 2022-06-02
Payer: COMMERCIAL

## 2022-06-02 VITALS
SYSTOLIC BLOOD PRESSURE: 136 MMHG | HEART RATE: 80 BPM | WEIGHT: 283 LBS | RESPIRATION RATE: 18 BRPM | DIASTOLIC BLOOD PRESSURE: 84 MMHG | HEIGHT: 67 IN | TEMPERATURE: 99 F | BODY MASS INDEX: 44.42 KG/M2

## 2022-06-02 DIAGNOSIS — M48.062 SPINAL STENOSIS OF LUMBAR REGION WITH NEUROGENIC CLAUDICATION: ICD-10-CM

## 2022-06-02 DIAGNOSIS — E11.9 DIABETES MELLITUS WITHOUT COMPLICATION (HCC): Primary | ICD-10-CM

## 2022-06-02 DIAGNOSIS — I10 ESSENTIAL HYPERTENSION: ICD-10-CM

## 2022-06-02 PROCEDURE — 3008F BODY MASS INDEX DOCD: CPT | Performed by: FAMILY MEDICINE

## 2022-06-02 PROCEDURE — 99214 OFFICE O/P EST MOD 30 MIN: CPT | Performed by: FAMILY MEDICINE

## 2022-06-02 PROCEDURE — 3075F SYST BP GE 130 - 139MM HG: CPT | Performed by: FAMILY MEDICINE

## 2022-06-02 PROCEDURE — 3079F DIAST BP 80-89 MM HG: CPT | Performed by: FAMILY MEDICINE

## 2022-06-02 RX ORDER — OLMESARTAN MEDOXOMIL 20 MG/1
20 TABLET ORAL DAILY
Qty: 30 TABLET | Refills: 0 | Status: SHIPPED | OUTPATIENT
Start: 2022-06-02

## 2022-06-02 RX ORDER — METFORMIN HYDROCHLORIDE 500 MG/1
1000 TABLET, EXTENDED RELEASE ORAL
Qty: 60 TABLET | Refills: 0 | Status: SHIPPED | OUTPATIENT
Start: 2022-06-02

## 2022-06-02 NOTE — TELEPHONE ENCOUNTER
Pt has been notified many times that he is overdue for CPX and lab work. Metformin  mg  Diabetic Medication Protocol Failed 06/02/2022 09:31 AM   Protocol Details  HgBA1C procedure resulted in past 6 months    Last HgBA1C < 7.5   Filled 4-28-22  Qty 60  0 refills  Upcoming appt.  Today

## 2022-06-02 NOTE — TELEPHONE ENCOUNTER
Olmesartan 20 mg  Hypertension Medications Protocol Failed 06/02/2022 11:32 AM   Protocol Details  CMP or BMP in past 12 months   Filled 5-8-22  Qty 30  0 refills  No upcoming appt.    LOV 6-2-22

## 2022-06-08 ENCOUNTER — TELEPHONE (OUTPATIENT)
Dept: NEUROLOGY | Facility: CLINIC | Age: 67
End: 2022-06-08

## 2022-06-08 NOTE — TELEPHONE ENCOUNTER
Initiated PA with Bandar Redding # [phone] [id number] / Uploaded clinical documentation / Pending case# [id number]

## 2022-06-13 ENCOUNTER — TELEPHONE (OUTPATIENT)
Dept: INTERNAL MEDICINE CLINIC | Facility: CLINIC | Age: 67
End: 2022-06-13

## 2022-06-13 NOTE — TELEPHONE ENCOUNTER
Pt schedule via [facility] for 'chills'     Sent message back to clarify symptoms     Attempt x1    Future Appointments   Date Time Provider Jorje Shira   6/14/2022  2:30 PM SHENA Garcia [facility] [place]   7/21/2022 11:30 AM Burak Gotti MD ENINAP[facility]2 [facility] [place]   8/29/2022  4:00 PM Shreya Garces MD [facility]     Please advise

## 2022-06-13 NOTE — TELEPHONE ENCOUNTER
Prior authorization request completed for: BRETT    Authorization # [id number]  Authorization dates: 6/13/2022 - 12/10/2022  CPT codes approved: 09857  Number of visits/dates of service approved: 1  Physician: Dr. Holden Mas   Location: THE [facility]     Patient can be scheduled. Routed to Navigator.

## 2022-06-13 NOTE — TELEPHONE ENCOUNTER
Question Answer   Anesthesia Type Local   Provider [facility]   Procedure Lumbar ADRIANNA   Medical clearance requested (will send to Pain Navigator) No   Patient has Medicare coverage?  No

## 2022-06-13 NOTE — TELEPHONE ENCOUNTER
RADHA:     Spoke with pt. Pt has had chills since Friday. Slight stuffy nose and cough. Pt strained his back on Thursday and has side pain. Stated that for pt to come into visit tomorrow we need proof of Negative Covid test. Pt verbalized understanding and will get a rapid covid test and either bring in negative results or send them through [facility] tomorrow. Pt verbalized understanding that if test is positive he will cancel his appt.

## 2022-06-13 NOTE — TELEPHONE ENCOUNTER
LVM.     Stated that because of his symptom of \"chills\", which is a possible covid symptom, he needs to show proof of a negative Covid test before he comes into the office tomorrow. Stated that it can be a home test or PCR. stated he can bring in the negative test or send a picture of it through [address] [place] [address] [address]    Stated he can call office with further questions.

## 2022-06-15 ENCOUNTER — TELEPHONE (OUTPATIENT)
Dept: INTERNAL MEDICINE CLINIC | Facility: CLINIC | Age: 67
End: 2022-06-15

## 2022-06-15 NOTE — TELEPHONE ENCOUNTER
Incoming fax from [address] notes from 6/15/2022    Placed in [employer] in basket for review

## 2022-06-16 NOTE — TELEPHONE ENCOUNTER
Pt called back stated, at this time he does not wish to schedule injection. Pt will call back when ready to schedule.

## 2022-06-30 ENCOUNTER — PATIENT MESSAGE (OUTPATIENT)
Dept: INTERNAL MEDICINE CLINIC | Facility: CLINIC | Age: 67
End: 2022-06-30

## 2022-06-30 NOTE — TELEPHONE ENCOUNTER
From: Radha Turpin  To: Wing PegueroSHENA og  Sent: 6/30/2022 1:34 PM CDT  Subject: Follow up and Forms    Good Afternoon,  I am Currently doing Physical Therapy again at [facility] in [place], [place], [place]. I met with Dr. Will Thomas of [facility] @ [facility], I was referred to him by 2 friends who also see him. For Physical Therapy I am concentrating on Strength and Agility in my lower region, the Therapist will send you the progress report when she does it. I will be doing this until [id number] week of August with the hope of being back to work by then. I was wondering if we could discuss those forms that we spoke about before, I spoke with both Insurance Companies being they contacted me about the Claims still being open. I really need this being one is for my Car Payment and the other is for merchandise that I purchased. The forms just verify that I am off work with an illness with the Spinal Stenosis, I know the forms say Disability, But they go to the [facility] so they can distribute the funds. Enjoy Your Holiday Weekend and I hope something can be done about this situation. Thanks Camille. ..

## 2022-07-01 ENCOUNTER — PATIENT MESSAGE (OUTPATIENT)
Dept: INTERNAL MEDICINE CLINIC | Facility: CLINIC | Age: 67
End: 2022-07-01

## 2022-07-01 NOTE — TELEPHONE ENCOUNTER
PT NEEDS AN OFFICE VISIT. PT NEEDS TO COME IN FASTING AND GET HIS LABS DONE THAT DAY. HE IS WAY OVERDUE!! PT NEEDS TO BRING THE FORMS WITH HIM THAT DAY TOO.

## 2022-07-01 NOTE — TELEPHONE ENCOUNTER
From: Caryl Self  Sent: 7/1/2022 7:08 AM CDT  To: [facility] Clinical Staff  Subject: Follow up and Forms    Ok. Thank You.   Happy Holidays to You All
22

## 2022-07-05 ENCOUNTER — LAB ENCOUNTER (OUTPATIENT)
Dept: LAB | Age: 67
End: 2022-07-05
Attending: FAMILY MEDICINE
Payer: COMMERCIAL

## 2022-07-05 ENCOUNTER — OFFICE VISIT (OUTPATIENT)
Dept: INTERNAL MEDICINE CLINIC | Facility: CLINIC | Age: 67
End: 2022-07-05
Payer: COMMERCIAL

## 2022-07-05 VITALS
HEART RATE: 78 BPM | WEIGHT: 274.38 LBS | TEMPERATURE: 98 F | DIASTOLIC BLOOD PRESSURE: 76 MMHG | SYSTOLIC BLOOD PRESSURE: 130 MMHG | RESPIRATION RATE: 15 BRPM | OXYGEN SATURATION: 98 % | BODY MASS INDEX: 43 KG/M2

## 2022-07-05 DIAGNOSIS — Z00.00 LABORATORY EXAMINATION ORDERED AS PART OF A ROUTINE GENERAL MEDICAL EXAMINATION: ICD-10-CM

## 2022-07-05 DIAGNOSIS — Z02.89 ENCOUNTER FOR COMPLETION OF FORM WITH PATIENT: ICD-10-CM

## 2022-07-05 DIAGNOSIS — E11.9 DIABETES MELLITUS WITHOUT COMPLICATION (HCC): Primary | ICD-10-CM

## 2022-07-05 DIAGNOSIS — Z12.5 SPECIAL SCREENING FOR MALIGNANT NEOPLASM OF PROSTATE: ICD-10-CM

## 2022-07-05 DIAGNOSIS — M48.062 SPINAL STENOSIS, LUMBAR REGION WITH NEUROGENIC CLAUDICATION: ICD-10-CM

## 2022-07-05 DIAGNOSIS — I10 ESSENTIAL HYPERTENSION: ICD-10-CM

## 2022-07-05 LAB
ALBUMIN SERPL-MCNC: 3.5 G/DL (ref 3.4–5)
ALBUMIN/GLOB SERPL: 0.9 {RATIO} (ref 1–2)
ALP LIVER SERPL-CCNC: 125 U/L
ALT SERPL-CCNC: 26 U/L
ANION GAP SERPL CALC-SCNC: 5 MMOL/L (ref 0–18)
AST SERPL-CCNC: 28 U/L (ref 15–37)
BASOPHILS # BLD AUTO: 0.03 X10(3) UL (ref 0–0.2)
BASOPHILS NFR BLD AUTO: 0.5 %
BILIRUB SERPL-MCNC: 1 MG/DL (ref 0.1–2)
BUN BLD-MCNC: 15 MG/DL (ref 7–18)
CALCIUM BLD-MCNC: 9.4 MG/DL (ref 8.5–10.1)
CHLORIDE SERPL-SCNC: 104 MMOL/L (ref 98–112)
CHOLEST SERPL-MCNC: 183 MG/DL (ref ?–200)
CO2 SERPL-SCNC: 30 MMOL/L (ref 21–32)
COMPLEXED PSA SERPL-MCNC: 0.62 NG/ML (ref ?–4)
CREAT BLD-MCNC: 1.19 MG/DL
EOSINOPHIL # BLD AUTO: 0.11 X10(3) UL (ref 0–0.7)
EOSINOPHIL NFR BLD AUTO: 1.7 %
ERYTHROCYTE [DISTWIDTH] IN BLOOD BY AUTOMATED COUNT: 14.5 %
EST. AVERAGE GLUCOSE BLD GHB EST-MCNC: 140 MG/DL (ref 68–126)
FASTING PATIENT LIPID ANSWER: YES
FASTING STATUS PATIENT QL REPORTED: YES
GLOBULIN PLAS-MCNC: 3.8 G/DL (ref 2.8–4.4)
GLUCOSE BLD-MCNC: 112 MG/DL (ref 70–99)
HBA1C MFR BLD: 6.5 % (ref ?–5.7)
HCT VFR BLD AUTO: 40.8 %
HDLC SERPL-MCNC: 47 MG/DL (ref 40–59)
HGB BLD-MCNC: 13.3 G/DL
IMM GRANULOCYTES # BLD AUTO: 0.01 X10(3) UL (ref 0–1)
IMM GRANULOCYTES NFR BLD: 0.2 %
LDLC SERPL CALC-MCNC: 111 MG/DL (ref ?–100)
LYMPHOCYTES # BLD AUTO: 1.91 X10(3) UL (ref 1–4)
LYMPHOCYTES NFR BLD AUTO: 30.3 %
MCH RBC QN AUTO: 30.6 PG (ref 26–34)
MCHC RBC AUTO-ENTMCNC: 32.6 G/DL (ref 31–37)
MCV RBC AUTO: 94 FL
MONOCYTES # BLD AUTO: 0.52 X10(3) UL (ref 0.1–1)
MONOCYTES NFR BLD AUTO: 8.3 %
NEUTROPHILS # BLD AUTO: 3.72 X10 (3) UL (ref 1.5–7.7)
NEUTROPHILS # BLD AUTO: 3.72 X10(3) UL (ref 1.5–7.7)
NEUTROPHILS NFR BLD AUTO: 59 %
NONHDLC SERPL-MCNC: 136 MG/DL (ref ?–130)
OSMOLALITY SERPL CALC.SUM OF ELEC: 290 MOSM/KG (ref 275–295)
PLATELET # BLD AUTO: 222 10(3)UL (ref 150–450)
POTASSIUM SERPL-SCNC: 4.3 MMOL/L (ref 3.5–5.1)
PROT SERPL-MCNC: 7.3 G/DL (ref 6.4–8.2)
RBC # BLD AUTO: 4.34 X10(6)UL
SODIUM SERPL-SCNC: 139 MMOL/L (ref 136–145)
TRIGL SERPL-MCNC: 140 MG/DL (ref 30–149)
TSI SER-ACNC: 1.65 MIU/ML (ref 0.36–3.74)
VIT D+METAB SERPL-MCNC: 24.1 NG/ML (ref 30–100)
VLDLC SERPL CALC-MCNC: 24 MG/DL (ref 0–30)
WBC # BLD AUTO: 6.3 X10(3) UL (ref 4–11)

## 2022-07-05 PROCEDURE — 84443 ASSAY THYROID STIM HORMONE: CPT

## 2022-07-05 PROCEDURE — 80053 COMPREHEN METABOLIC PANEL: CPT

## 2022-07-05 PROCEDURE — 85025 COMPLETE CBC W/AUTO DIFF WBC: CPT

## 2022-07-05 PROCEDURE — 3078F DIAST BP <80 MM HG: CPT | Performed by: FAMILY MEDICINE

## 2022-07-05 PROCEDURE — 80061 LIPID PANEL: CPT

## 2022-07-05 PROCEDURE — 3044F HG A1C LEVEL LT 7.0%: CPT | Performed by: FAMILY MEDICINE

## 2022-07-05 PROCEDURE — 36415 COLL VENOUS BLD VENIPUNCTURE: CPT

## 2022-07-05 PROCEDURE — 3075F SYST BP GE 130 - 139MM HG: CPT | Performed by: FAMILY MEDICINE

## 2022-07-05 PROCEDURE — 82306 VITAMIN D 25 HYDROXY: CPT

## 2022-07-05 PROCEDURE — 83036 HEMOGLOBIN GLYCOSYLATED A1C: CPT

## 2022-07-05 PROCEDURE — 99214 OFFICE O/P EST MOD 30 MIN: CPT | Performed by: FAMILY MEDICINE

## 2022-07-05 RX ORDER — AMLODIPINE BESYLATE 5 MG/1
5 TABLET ORAL DAILY
Qty: 90 TABLET | Refills: 0 | Status: SHIPPED | OUTPATIENT
Start: 2022-07-05

## 2022-07-05 RX ORDER — CARVEDILOL 25 MG/1
25 TABLET ORAL 2 TIMES DAILY WITH MEALS
Qty: 180 TABLET | Refills: 0 | Status: SHIPPED | OUTPATIENT
Start: 2022-07-05

## 2022-07-05 RX ORDER — MELOXICAM 15 MG/1
15 TABLET ORAL DAILY PRN
COMMUNITY
Start: 2022-06-15

## 2022-07-05 RX ORDER — METFORMIN HYDROCHLORIDE 500 MG/1
1000 TABLET, EXTENDED RELEASE ORAL
Qty: 180 TABLET | Refills: 0 | Status: SHIPPED | OUTPATIENT
Start: 2022-07-05

## 2022-07-05 RX ORDER — OLMESARTAN MEDOXOMIL 20 MG/1
20 TABLET ORAL DAILY
Qty: 90 TABLET | Refills: 0 | Status: SHIPPED | OUTPATIENT
Start: 2022-07-05

## 2022-07-07 DIAGNOSIS — R74.8 ELEVATED ALKALINE PHOSPHATASE LEVEL: ICD-10-CM

## 2022-07-07 DIAGNOSIS — E55.9 VITAMIN D DEFICIENCY: Primary | ICD-10-CM

## 2022-07-07 DIAGNOSIS — E11.9 DIABETES MELLITUS WITHOUT COMPLICATION (HCC): ICD-10-CM

## 2022-07-07 RX ORDER — ERGOCALCIFEROL 1.25 MG/1
50000 CAPSULE ORAL WEEKLY
Qty: 24 CAPSULE | Refills: 0 | Status: SHIPPED | OUTPATIENT
Start: 2022-07-07

## 2022-07-21 ENCOUNTER — OFFICE VISIT (OUTPATIENT)
Dept: SURGERY | Facility: CLINIC | Age: 67
End: 2022-07-21
Payer: COMMERCIAL

## 2022-07-21 ENCOUNTER — TELEPHONE (OUTPATIENT)
Dept: INTERNAL MEDICINE CLINIC | Facility: CLINIC | Age: 67
End: 2022-07-21

## 2022-07-21 VITALS — DIASTOLIC BLOOD PRESSURE: 80 MMHG | SYSTOLIC BLOOD PRESSURE: 140 MMHG | HEART RATE: 85 BPM

## 2022-07-21 DIAGNOSIS — M48.062 LUMBAR STENOSIS WITH NEUROGENIC CLAUDICATION: Primary | ICD-10-CM

## 2022-07-21 PROCEDURE — 99212 OFFICE O/P EST SF 10 MIN: CPT | Performed by: NEUROLOGICAL SURGERY

## 2022-07-21 PROCEDURE — 3077F SYST BP >= 140 MM HG: CPT | Performed by: NEUROLOGICAL SURGERY

## 2022-07-21 PROCEDURE — 3079F DIAST BP 80-89 MM HG: CPT | Performed by: NEUROLOGICAL SURGERY

## 2022-07-21 NOTE — PROGRESS NOTES
Neurosurgery Clinic Visit  2022    Brayan Trinidad PCP:  Merced Stinson MD    3/18/1955 MRN [id number]     HISTORY OF PRESENT ILLNESS:  Brayan Trinidad is a(n) 74year old male here for follow-up status post lumbar stenosis and neurogenic claudication  He got 2 injections  He got great relief with these He has a third 1 approved  He still have some pain is burning some pain today he is using a walker but normally does not  Overall he is much improved  He is here for follow-up      PHYSICAL EXAMINATION:  Vital Signs:  /80   Pulse 85   Awake alert, orient x3  Fosamax x4      REVIEW OF STUDIES:    MRI reviewed which shows moderate severe stenosis at L2-3 and little worse at L3-4 as well as some degeneration      ASSESSMENT and PLAN:  72-year-old gentleman with lumbar stenosis and neurogenic claudication  Denies discussion about treatment options  He is  Get the third injections  He will see me back in   We will see how he is doing after that  Briefly discussed a laminectomy  But currently he is doing pretty good  All questions were answered  Patient very appreciative        Time spent on counseling/coordination of care:  15 Minutes    Total time spent with patient:  [address] [facility], [place]   [address] Kumari  2022  12:11 PM   Dictated not proofread

## 2022-07-21 NOTE — TELEPHONE ENCOUNTER
Incoming fax from [address] [facility] [address] at [facility]    Patients PT progress note from 7/20/2022    Placed in TO in basket for review

## 2022-07-22 ENCOUNTER — TELEPHONE (OUTPATIENT)
Dept: INTERNAL MEDICINE CLINIC | Facility: CLINIC | Age: 67
End: 2022-07-22

## 2022-07-23 NOTE — TELEPHONE ENCOUNTER
Since reviewing the form, I feel his back doctor should fill this out.  I do not know exactly his obstacles, impairment, functional abilities etc. The specialist would know his outcomes not I.

## 2022-07-25 ENCOUNTER — TELEPHONE (OUTPATIENT)
Dept: SURGERY | Facility: CLINIC | Age: 67
End: 2022-07-25

## 2022-07-25 NOTE — TELEPHONE ENCOUNTER
Rec'd fax from [address] [place] [address] for disability paperwork. Placing forms in disability folder for pickup.

## 2022-07-25 NOTE — TELEPHONE ENCOUNTER
Relayed to patient, he requested forms be faxed to Dr. Jennings Most office at [phone]. Forms faxed. Confirmation received.

## 2022-07-26 NOTE — TELEPHONE ENCOUNTER
Received another form for disability. Placed in SM basket to review. If you want me to fax it to Dr. Felix Pineda office let me know.

## 2022-07-27 NOTE — TELEPHONE ENCOUNTER
Paperwork received [facility] paper work for [employer] filled out what I could placed in [facility] paperwork CMA initiated for nursing to finish.

## 2022-08-02 ENCOUNTER — HOSPITAL ENCOUNTER (OUTPATIENT)
Facility: HOSPITAL | Age: 67
Setting detail: HOSPITAL OUTPATIENT SURGERY
Discharge: HOME | End: 2022-08-02
Attending: ANESTHESIOLOGY | Admitting: ANESTHESIOLOGY
Payer: COMMERCIAL

## 2022-08-02 ENCOUNTER — APPOINTMENT (OUTPATIENT)
Dept: GENERAL RADIOLOGY | Facility: HOSPITAL | Age: 67
End: 2022-08-02
Attending: ANESTHESIOLOGY
Payer: COMMERCIAL

## 2022-08-02 VITALS
RESPIRATION RATE: 18 BRPM | HEART RATE: 64 BPM | DIASTOLIC BLOOD PRESSURE: 85 MMHG | OXYGEN SATURATION: 96 % | SYSTOLIC BLOOD PRESSURE: 136 MMHG | TEMPERATURE: 99 F

## 2022-08-02 DIAGNOSIS — M51.36 DDD (DEGENERATIVE DISC DISEASE), LUMBAR: ICD-10-CM

## 2022-08-02 LAB — GLUCOSE BLD-MCNC: 131 MG/DL (ref 70–99)

## 2022-08-02 PROCEDURE — 3E0S33Z INTRODUCTION OF ANTI-INFLAMMATORY INTO EPIDURAL SPACE, PERCUTANEOUS APPROACH: ICD-10-PCS | Performed by: ANESTHESIOLOGY

## 2022-08-02 PROCEDURE — 62323 NJX INTERLAMINAR LMBR/SAC: CPT | Performed by: ANESTHESIOLOGY

## 2022-08-02 RX ORDER — SODIUM CHLORIDE 9 MG/ML
INJECTION INTRAVENOUS
Status: DISCONTINUED | OUTPATIENT
Start: 2022-08-02 | End: 2022-08-02

## 2022-08-02 RX ORDER — LIDOCAINE HYDROCHLORIDE 10 MG/ML
INJECTION, SOLUTION EPIDURAL; INFILTRATION; INTRACAUDAL; PERINEURAL
Status: DISCONTINUED | OUTPATIENT
Start: 2022-08-02 | End: 2022-08-02

## 2022-08-02 RX ORDER — NICOTINE POLACRILEX 4 MG
30 LOZENGE BUCCAL
Status: DISCONTINUED | OUTPATIENT
Start: 2022-08-02 | End: 2022-08-02

## 2022-08-02 RX ORDER — SODIUM CHLORIDE, SODIUM LACTATE, POTASSIUM CHLORIDE, CALCIUM CHLORIDE 600; 310; 30; 20 MG/100ML; MG/100ML; MG/100ML; MG/100ML
100 INJECTION, SOLUTION INTRAVENOUS CONTINUOUS
Status: DISCONTINUED | OUTPATIENT
Start: 2022-08-02 | End: 2022-08-02

## 2022-08-02 RX ORDER — DEXTROSE MONOHYDRATE 25 G/50ML
50 INJECTION, SOLUTION INTRAVENOUS
Status: DISCONTINUED | OUTPATIENT
Start: 2022-08-02 | End: 2022-08-02

## 2022-08-02 RX ORDER — NICOTINE POLACRILEX 4 MG
15 LOZENGE BUCCAL
Status: DISCONTINUED | OUTPATIENT
Start: 2022-08-02 | End: 2022-08-02

## 2022-08-02 RX ORDER — DEXAMETHASONE SODIUM PHOSPHATE 10 MG/ML
INJECTION, SOLUTION INTRAMUSCULAR; INTRAVENOUS
Status: DISCONTINUED | OUTPATIENT
Start: 2022-08-02 | End: 2022-08-02

## 2022-08-02 RX ORDER — INSULIN ASPART 100 [IU]/ML
3 INJECTION, SOLUTION INTRAVENOUS; SUBCUTANEOUS ONCE
Status: DISCONTINUED | OUTPATIENT
Start: 2022-08-02 | End: 2022-08-02

## 2022-08-02 NOTE — OPERATIVE REPORT
[facility]  Operative Report  2022     Светлана Aguayo Patient Status:  Hospital Outpatient Surgery    3/18/1955 MRN [id number]   Location [facility] CENTER Attending Elizabeth Arndt MD   Hosp Day # 0 PCP Luis Valiente MD     Indication: Alma Garcia is a 74year old male with lumbar degenerative disc disease    Imaging: Lumbar x-ray    Preoperative Diagnosis:  DDD (degenerative disc disease), lumbar [M51.36]    Postoperative Diagnosis: Same as above. Procedure performed: LUMBAR INTERLAMINAR EPIDURAL INJECTION with local    Anesthesia: Local     EBL: Less than 1 ml. Procedure Description:  After reviewing the patient's history and performing a focused physical examination, the diagnosis and positive previous diagnostic tests were confirmed and contraindications such as infection and coagulopathy were ruled out. Following review of allergies and potential side effects and complications, including but not necessarily limited to infection, allergic reaction, local tissue breakdown, nerve injury, post-dural puncture headache and paresis, the patient consented for the procedure. The patient was brought to the procedure room in prone position. After sterile prep lumbar spine, the L5-S1 interspace was identified with the help of fluoroscopy. Local anesthetic was given by a 25 gauge 1.5 inch needle with 1% lidocaine in that space level. Thereafter, a 20 gauge Tuohy needle was introduced and advanced under fluoroscopy. The epidural space was accessed by using loss of resistance to air technique. The needle position was confirmed with AP and lateral view under fluoroscopy. Omnipaque 180 was injected in 1 mL volume. A good epidurogram was obtained. Thereafter, dexamethasone 10 mg with normal saline of 5 mL in total volume of 6 mL was injected through the Tuohy needle. The needle was flushed with 1 mL lidocaine. The needle was withdrawn with the stylet intact in situ.   The needle's tip was intact. The patient tolerated the procedure very well without significant immediate complication. The patient's back was cleaned and sterile dressing was applied. The patient was discharged with an instruction to a responsible adult after discharge criteria were met. The patient was advised to contact us should any problems happen. The patient was also informed to go to the emergency room immediately if experiencing severe numbness or weakness in the extremities or experiencing bowel or bladder incontinence. Complications: None. Follow up: The patient was followed in the pain clinic as needed basis.           Camelia Silverman MD

## 2022-08-09 NOTE — TELEPHONE ENCOUNTER
Paperwork received by nursing,  Initiated, endorsed to provider for review. Will be sent to scanning once received back from provider. Noted Dr Ledell Cheadle wrote a letter stating pt may return back to work on 7/21/22. Called pt to inform that we cannot file Short Term Disability paperwork if the provider does not see it as appropriate for pt to be off work. Pt states:  -Cannot go back to work because walk on his own.   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   -has been off work since February 2022 due to pain      Re-read 's \"Return to work note\" from 7/21/22 with another RN and was understood pt is to be off work until 9/6/22 for further evaluation.

## 2022-08-18 ENCOUNTER — OFFICE VISIT (OUTPATIENT)
Dept: INTERNAL MEDICINE CLINIC | Facility: CLINIC | Age: 67
End: 2022-08-18
Payer: COMMERCIAL

## 2022-08-18 VITALS
BODY MASS INDEX: 46.15 KG/M2 | HEIGHT: 67 IN | WEIGHT: 294 LBS | RESPIRATION RATE: 16 BRPM | HEART RATE: 82 BPM | DIASTOLIC BLOOD PRESSURE: 88 MMHG | SYSTOLIC BLOOD PRESSURE: 138 MMHG | TEMPERATURE: 99 F

## 2022-08-18 DIAGNOSIS — Z00.00 ROUTINE GENERAL MEDICAL EXAMINATION AT A HEALTH CARE FACILITY: Primary | ICD-10-CM

## 2022-08-18 DIAGNOSIS — Z00.00 LABORATORY EXAMINATION ORDERED AS PART OF A ROUTINE GENERAL MEDICAL EXAMINATION: ICD-10-CM

## 2022-08-18 DIAGNOSIS — Z12.11 ENCOUNTER FOR SCREENING FECAL OCCULT BLOOD TESTING: ICD-10-CM

## 2022-08-18 LAB
BILIRUB UR QL STRIP.AUTO: NEGATIVE
CLARITY UR REFRACT.AUTO: CLEAR
COLOR UR AUTO: YELLOW
CREAT UR-SCNC: 78.9 MG/DL
GLUCOSE UR STRIP.AUTO-MCNC: NEGATIVE MG/DL
KETONES UR STRIP.AUTO-MCNC: NEGATIVE MG/DL
LEUKOCYTE ESTERASE UR QL STRIP.AUTO: NEGATIVE
MICROALBUMIN UR-MCNC: 28.7 MG/DL
MICROALBUMIN/CREAT 24H UR-RTO: 363.8 UG/MG (ref ?–30)
NITRITE UR QL STRIP.AUTO: NEGATIVE
PH UR STRIP.AUTO: 6.5 [PH] (ref 5–8)
SP GR UR STRIP.AUTO: 1.02 (ref 1–1.03)
UROBILINOGEN UR STRIP.AUTO-MCNC: 0.2 MG/DL

## 2022-08-18 PROCEDURE — 82570 ASSAY OF URINE CREATININE: CPT | Performed by: FAMILY MEDICINE

## 2022-08-18 PROCEDURE — 3060F POS MICROALBUMINURIA REV: CPT | Performed by: FAMILY MEDICINE

## 2022-08-18 PROCEDURE — 81015 MICROSCOPIC EXAM OF URINE: CPT | Performed by: FAMILY MEDICINE

## 2022-08-18 PROCEDURE — 99397 PER PM REEVAL EST PAT 65+ YR: CPT | Performed by: FAMILY MEDICINE

## 2022-08-18 PROCEDURE — 82043 UR ALBUMIN QUANTITATIVE: CPT | Performed by: FAMILY MEDICINE

## 2022-08-18 PROCEDURE — 81001 URINALYSIS AUTO W/SCOPE: CPT | Performed by: FAMILY MEDICINE

## 2022-08-18 PROCEDURE — 3008F BODY MASS INDEX DOCD: CPT | Performed by: FAMILY MEDICINE

## 2022-08-18 PROCEDURE — 3075F SYST BP GE 130 - 139MM HG: CPT | Performed by: FAMILY MEDICINE

## 2022-08-18 PROCEDURE — 3079F DIAST BP 80-89 MM HG: CPT | Performed by: FAMILY MEDICINE

## 2022-08-19 ENCOUNTER — TELEPHONE (OUTPATIENT)
Dept: INTERNAL MEDICINE CLINIC | Facility: CLINIC | Age: 67
End: 2022-08-19

## 2022-08-22 ENCOUNTER — TELEPHONE (OUTPATIENT)
Dept: INTERNAL MEDICINE CLINIC | Facility: CLINIC | Age: 67
End: 2022-08-22

## 2022-08-22 DIAGNOSIS — I10 ESSENTIAL HYPERTENSION: ICD-10-CM

## 2022-08-22 DIAGNOSIS — R82.90 ABNORMAL URINALYSIS: Primary | ICD-10-CM

## 2022-08-22 RX ORDER — OLMESARTAN MEDOXOMIL 40 MG/1
40 TABLET ORAL NIGHTLY
COMMUNITY
Start: 2022-08-22

## 2022-08-22 NOTE — TELEPHONE ENCOUNTER
----- Message from SHENA Gallo sent at 8/20/2022  4:39 PM CDT -----  U/A shows small blood, rare bacteria and a little protein. Is the Pt having any urinary symptoms? If not push fluids and repeat urine in a month. Micro albumin ratio is up. Pt needs better control of his diabetes and blood pressure. Lets increase olmesartan to 40 mg daily and recheck micro albumin in 6 months.

## 2022-08-25 ENCOUNTER — ORDER TRANSCRIPTION (OUTPATIENT)
Dept: ADMINISTRATIVE | Facility: HOSPITAL | Age: 67
End: 2022-08-25

## 2022-08-25 DIAGNOSIS — Z01.818 PREOP TESTING: Primary | ICD-10-CM

## 2022-08-25 DIAGNOSIS — M48.061 SPINAL STENOSIS OF LUMBAR REGION: Primary | ICD-10-CM

## 2022-08-29 ENCOUNTER — LAB ENCOUNTER (OUTPATIENT)
Dept: LAB | Age: 67
End: 2022-08-29
Attending: ORTHOPAEDIC SURGERY
Payer: COMMERCIAL

## 2022-08-29 DIAGNOSIS — Z01.818 PREOP TESTING: ICD-10-CM

## 2022-08-30 LAB — SARS-COV-2 RNA RESP QL NAA+PROBE: NOT DETECTED

## 2022-09-01 ENCOUNTER — HOSPITAL ENCOUNTER (OUTPATIENT)
Dept: GENERAL RADIOLOGY | Facility: HOSPITAL | Age: 67
Discharge: HOME | End: 2022-09-01
Attending: ORTHOPAEDIC SURGERY
Payer: COMMERCIAL

## 2022-09-01 ENCOUNTER — HOSPITAL ENCOUNTER (OUTPATIENT)
Dept: CT IMAGING | Facility: HOSPITAL | Age: 67
Discharge: HOME | End: 2022-09-01
Attending: ORTHOPAEDIC SURGERY
Payer: COMMERCIAL

## 2022-09-01 VITALS
DIASTOLIC BLOOD PRESSURE: 64 MMHG | HEIGHT: 67 IN | SYSTOLIC BLOOD PRESSURE: 145 MMHG | OXYGEN SATURATION: 98 % | HEART RATE: 64 BPM | WEIGHT: 294 LBS | BODY MASS INDEX: 46.15 KG/M2

## 2022-09-01 DIAGNOSIS — M48.061 SPINAL STENOSIS OF LUMBAR REGION: ICD-10-CM

## 2022-09-01 PROCEDURE — 62304 MYELOGRAPHY LUMBAR INJECTION: CPT | Performed by: ORTHOPAEDIC SURGERY

## 2022-09-01 PROCEDURE — 72132 CT LUMBAR SPINE W/DYE: CPT | Performed by: ORTHOPAEDIC SURGERY

## 2022-09-01 RX ORDER — LIDOCAINE HYDROCHLORIDE 10 MG/ML
INJECTION, SOLUTION EPIDURAL; INFILTRATION; INTRACAUDAL; PERINEURAL
Status: COMPLETED
Start: 2022-09-01 | End: 2022-09-01

## 2022-09-01 RX ORDER — LIDOCAINE HYDROCHLORIDE 10 MG/ML
5 INJECTION, SOLUTION EPIDURAL; INFILTRATION; INTRACAUDAL; PERINEURAL ONCE
Status: COMPLETED | OUTPATIENT
Start: 2022-09-01 | End: 2022-09-01

## 2022-09-01 RX ADMIN — LIDOCAINE HYDROCHLORIDE 5 ML: 10 INJECTION, SOLUTION EPIDURAL; INFILTRATION; INTRACAUDAL; PERINEURAL at 13:00:00

## 2022-09-01 NOTE — IMAGING NOTE
S; POST L-MYLEOGRAM    B; RN CONT. W/CARE. AWARE OF HX/ALG/MED    A; 1300 PT TO RAD HOLDING POST CT - NO COMPLICATIONS NOTED. PT A&O, HOB 35 DEGREE. VSS. CANSECO. SKIN PINK D/I. SITE D/I.  SEE FLOW SHEET FOR VS  POST CARE INSTRUCTIONS V/W GIVEN. PT AGREES W/POC.     R; 1350  NO CHANGES IN STATUS/ASSESSMENT. VSS, DENIES H/A,  SITE D/I. V/W-AVS GIVEN. VERBALIZED UNDERSTANDING. UP TO BR VOIDED, NO PT C/O. STEADY GAIT W/USE OF WALKER.      1400 PT DISCHARGED TO ENTRANCE BY RN VIA WC TO "[employer] . [employer]

## 2022-09-01 NOTE — IMAGING NOTE
S; SCHEDULED C-MYELOGRAM   B;  ALG/MEDS REVIEWED,  HX MORBID OBESITY BMI 46. Jose Miguel Reveal HX HTN ON MEDS. DM ON MEDS, GOUT, CHRONIC KIDNEY DISEASE.    A; 1130 RN IN W/PT FOR PROCEDURE ASSESSMENT. A&O, CANSECO, SKIN PINK D/.  BP ELEVATED. PADMINI DUMONT NOTIFIED, INSTRUCTED TO NOTIFY PROCEDURE MD, ASK MD IF HE WOULD LIKE RN TO REPEAT PRIOR TO PROCEDURE. NO PT C/O. STATES HTN IN AM.  DENIES TAKING ANY ASA/NSAIDS. STATES TOOK [address]  WK AGO. R;. NO MEDICATION CONTRADICTIONS FOR PROCEDURE. RISK/BENEFITS DISCUSSED. PT AWARE OF POSSIBLE RISK OF SPINAL H/A. S/S AND TX DISCUSSED. PT AGREES W/POC. XRAY TECH TO OBTAIN CONSENT.    SEE PROCEDURE MD NOTES

## 2022-09-13 ENCOUNTER — OFFICE VISIT (OUTPATIENT)
Dept: INTERNAL MEDICINE CLINIC | Facility: CLINIC | Age: 67
End: 2022-09-13
Payer: COMMERCIAL

## 2022-09-13 ENCOUNTER — LAB ENCOUNTER (OUTPATIENT)
Dept: LAB | Age: 67
End: 2022-09-13
Attending: FAMILY MEDICINE
Payer: COMMERCIAL

## 2022-09-13 ENCOUNTER — EKG ENCOUNTER (OUTPATIENT)
Dept: LAB | Age: 67
End: 2022-09-13
Attending: FAMILY MEDICINE
Payer: COMMERCIAL

## 2022-09-13 VITALS
BODY MASS INDEX: 45.67 KG/M2 | WEIGHT: 291 LBS | TEMPERATURE: 99 F | DIASTOLIC BLOOD PRESSURE: 86 MMHG | HEART RATE: 80 BPM | RESPIRATION RATE: 18 BRPM | OXYGEN SATURATION: 98 % | SYSTOLIC BLOOD PRESSURE: 140 MMHG | HEIGHT: 67 IN

## 2022-09-13 DIAGNOSIS — M48.062 SPINAL STENOSIS, LUMBAR REGION WITH NEUROGENIC CLAUDICATION: ICD-10-CM

## 2022-09-13 DIAGNOSIS — M48.062 LUMBAR STENOSIS WITH NEUROGENIC CLAUDICATION: ICD-10-CM

## 2022-09-13 DIAGNOSIS — M54.16 LUMBAR RADICULOPATHY: ICD-10-CM

## 2022-09-13 DIAGNOSIS — Z01.818 PREOP EXAMINATION: Primary | ICD-10-CM

## 2022-09-13 DIAGNOSIS — Z00.00 LABORATORY EXAMINATION ORDERED AS PART OF A ROUTINE GENERAL MEDICAL EXAMINATION: ICD-10-CM

## 2022-09-13 LAB
ALBUMIN SERPL-MCNC: 3.9 G/DL (ref 3.4–5)
ALBUMIN/GLOB SERPL: 1 {RATIO} (ref 1–2)
ALP LIVER SERPL-CCNC: 148 U/L
ALT SERPL-CCNC: 42 U/L
ANION GAP SERPL CALC-SCNC: 3 MMOL/L (ref 0–18)
APTT PPP: 37.4 SECONDS (ref 23.3–35.6)
AST SERPL-CCNC: 32 U/L (ref 15–37)
ATRIAL RATE: 66 BPM
BASOPHILS # BLD AUTO: 0.03 X10(3) UL (ref 0–0.2)
BASOPHILS NFR BLD AUTO: 0.4 %
BILIRUB SERPL-MCNC: 1.1 MG/DL (ref 0.1–2)
BILIRUB UR QL STRIP.AUTO: NEGATIVE
BUN BLD-MCNC: 18 MG/DL (ref 7–18)
CALCIUM BLD-MCNC: 9.6 MG/DL (ref 8.5–10.1)
CHLORIDE SERPL-SCNC: 107 MMOL/L (ref 98–112)
CLARITY UR REFRACT.AUTO: CLEAR
CO2 SERPL-SCNC: 31 MMOL/L (ref 21–32)
COLOR UR AUTO: YELLOW
CREAT BLD-MCNC: 1.5 MG/DL
EOSINOPHIL # BLD AUTO: 0.12 X10(3) UL (ref 0–0.7)
EOSINOPHIL NFR BLD AUTO: 1.8 %
ERYTHROCYTE [DISTWIDTH] IN BLOOD BY AUTOMATED COUNT: 14 %
FASTING STATUS PATIENT QL REPORTED: YES
GFR SERPLBLD BASED ON 1.73 SQ M-ARVRAT: 51 ML/MIN/1.73M2 (ref 60–?)
GLOBULIN PLAS-MCNC: 3.8 G/DL (ref 2.8–4.4)
GLUCOSE BLD-MCNC: 131 MG/DL (ref 70–99)
GLUCOSE UR STRIP.AUTO-MCNC: NEGATIVE MG/DL
HCT VFR BLD AUTO: 45.2 %
HGB BLD-MCNC: 14.5 G/DL
IMM GRANULOCYTES # BLD AUTO: 0.01 X10(3) UL (ref 0–1)
IMM GRANULOCYTES NFR BLD: 0.1 %
INR BLD: 1.05 (ref 0.85–1.16)
KETONES UR STRIP.AUTO-MCNC: NEGATIVE MG/DL
LEUKOCYTE ESTERASE UR QL STRIP.AUTO: NEGATIVE
LYMPHOCYTES # BLD AUTO: 1.92 X10(3) UL (ref 1–4)
LYMPHOCYTES NFR BLD AUTO: 28.6 %
MCH RBC QN AUTO: 30.3 PG (ref 26–34)
MCHC RBC AUTO-ENTMCNC: 32.1 G/DL (ref 31–37)
MCV RBC AUTO: 94.6 FL
MONOCYTES # BLD AUTO: 0.64 X10(3) UL (ref 0.1–1)
MONOCYTES NFR BLD AUTO: 9.5 %
NEUTROPHILS # BLD AUTO: 4 X10 (3) UL (ref 1.5–7.7)
NEUTROPHILS # BLD AUTO: 4 X10(3) UL (ref 1.5–7.7)
NEUTROPHILS NFR BLD AUTO: 59.6 %
NITRITE UR QL STRIP.AUTO: NEGATIVE
OSMOLALITY SERPL CALC.SUM OF ELEC: 296 MOSM/KG (ref 275–295)
P AXIS: 35 DEGREES
P-R INTERVAL: 172 MS
PH UR STRIP.AUTO: 6 [PH] (ref 5–8)
PLATELET # BLD AUTO: 200 10(3)UL (ref 150–450)
POTASSIUM SERPL-SCNC: 4.4 MMOL/L (ref 3.5–5.1)
PROT SERPL-MCNC: 7.7 G/DL (ref 6.4–8.2)
PROTHROMBIN TIME: 13.7 SECONDS (ref 11.6–14.8)
Q-T INTERVAL: 378 MS
QRS DURATION: 74 MS
QTC CALCULATION (BEZET): 396 MS
R AXIS: 38 DEGREES
RBC # BLD AUTO: 4.78 X10(6)UL
RBC UR QL AUTO: NEGATIVE
SODIUM SERPL-SCNC: 141 MMOL/L (ref 136–145)
SP GR UR STRIP.AUTO: 1.02 (ref 1–1.03)
T AXIS: 48 DEGREES
UROBILINOGEN UR STRIP.AUTO-MCNC: 0.2 MG/DL
VENTRICULAR RATE: 66 BPM
WBC # BLD AUTO: 6.7 X10(3) UL (ref 4–11)

## 2022-09-13 PROCEDURE — 93010 ELECTROCARDIOGRAM REPORT: CPT | Performed by: INTERNAL MEDICINE

## 2022-09-13 PROCEDURE — 36415 COLL VENOUS BLD VENIPUNCTURE: CPT

## 2022-09-13 PROCEDURE — 85610 PROTHROMBIN TIME: CPT

## 2022-09-13 PROCEDURE — 85025 COMPLETE CBC W/AUTO DIFF WBC: CPT

## 2022-09-13 PROCEDURE — 93005 ELECTROCARDIOGRAM TRACING: CPT

## 2022-09-13 PROCEDURE — 99214 OFFICE O/P EST MOD 30 MIN: CPT | Performed by: FAMILY MEDICINE

## 2022-09-13 PROCEDURE — 81001 URINALYSIS AUTO W/SCOPE: CPT

## 2022-09-13 PROCEDURE — 3008F BODY MASS INDEX DOCD: CPT | Performed by: FAMILY MEDICINE

## 2022-09-13 PROCEDURE — 81015 MICROSCOPIC EXAM OF URINE: CPT

## 2022-09-13 PROCEDURE — 80053 COMPREHEN METABOLIC PANEL: CPT

## 2022-09-13 PROCEDURE — 85730 THROMBOPLASTIN TIME PARTIAL: CPT

## 2022-09-13 PROCEDURE — 3079F DIAST BP 80-89 MM HG: CPT | Performed by: FAMILY MEDICINE

## 2022-09-13 PROCEDURE — 3077F SYST BP >= 140 MM HG: CPT | Performed by: FAMILY MEDICINE

## 2022-09-14 ENCOUNTER — TELEPHONE (OUTPATIENT)
Dept: INTERNAL MEDICINE CLINIC | Facility: CLINIC | Age: 67
End: 2022-09-14

## 2022-09-15 ENCOUNTER — PATIENT MESSAGE (OUTPATIENT)
Dept: INTERNAL MEDICINE CLINIC | Facility: CLINIC | Age: 67
End: 2022-09-15

## 2022-09-15 NOTE — TELEPHONE ENCOUNTER
From: Pearl Troncoso  To: SHENA Erickson  Sent: 9/15/2022 10:26 AM CDT  Subject: EKG Tracings    Morning,  Dr. Mark Arriola says when You get a chance they need the [facility] TRACINGS\" From the EKG. Whatever that is.     Thanks

## 2022-09-21 ENCOUNTER — TELEPHONE (OUTPATIENT)
Dept: INTERNAL MEDICINE CLINIC | Facility: CLINIC | Age: 67
End: 2022-09-21

## 2022-09-22 ENCOUNTER — TELEPHONE (OUTPATIENT)
Dept: SURGERY | Facility: CLINIC | Age: 67
End: 2022-09-22

## 2022-09-27 NOTE — TELEPHONE ENCOUNTER
[facility] sent to pt.  With SM message below    Kept forms in [employer] on desk (blue file)

## 2022-09-28 NOTE — TELEPHONE ENCOUNTER
Faxed form. Placed in Accordion above sink with confirmation. [facility] sent to pt to notify.

## 2022-10-05 DIAGNOSIS — M10.9 GOUTY ARTHROPATHY: ICD-10-CM

## 2022-10-06 RX ORDER — COLCHICINE 0.6 MG/1
TABLET ORAL
Qty: 90 TABLET | Refills: 0 | Status: SHIPPED | OUTPATIENT
Start: 2022-10-06

## 2022-10-07 NOTE — TELEPHONE ENCOUNTER
Called and spoke to the patient to ask if he needed the [employer] form. Patient states \"no we are good\" and no longer needs the form anymore. Patient had no further questions.     Form went to shredding bin

## 2022-10-08 DIAGNOSIS — I10 ESSENTIAL HYPERTENSION: ICD-10-CM

## 2022-10-08 DIAGNOSIS — E11.9 DIABETES MELLITUS WITHOUT COMPLICATION (HCC): ICD-10-CM

## 2022-10-09 RX ORDER — METFORMIN HYDROCHLORIDE 500 MG/1
1000 TABLET, EXTENDED RELEASE ORAL
Qty: 180 TABLET | Refills: 0 | Status: SHIPPED | OUTPATIENT
Start: 2022-10-09

## 2022-10-09 RX ORDER — AMLODIPINE BESYLATE 5 MG/1
5 TABLET ORAL DAILY
Qty: 90 TABLET | Refills: 0 | Status: SHIPPED | OUTPATIENT
Start: 2022-10-09

## 2022-10-12 ENCOUNTER — PATIENT MESSAGE (OUTPATIENT)
Dept: INTERNAL MEDICINE CLINIC | Facility: CLINIC | Age: 67
End: 2022-10-12

## 2022-10-12 NOTE — TELEPHONE ENCOUNTER
From: Fred Riedel  To: SHENA Alicea  Sent: 10/12/2022 11:07 AM CDT  Subject: Sinus Infection    Morning,   Hoping all went well with the Procedures, so far so good on this end. But I did aquire a Sinus Infection thru this all. I was hoping that I could get some Antibiotics to treat this condition if that is possible. I checked with Dr. Maren Gordon office to make sure it wasn't a Spinal Leak and I seem to be good in that area. Thank You for helping me get thru all this.

## 2022-10-13 RX ORDER — AMOXICILLIN AND CLAVULANATE POTASSIUM 875; 125 MG/1; MG/1
1 TABLET, FILM COATED ORAL 2 TIMES DAILY
Qty: 20 TABLET | Refills: 0 | Status: SHIPPED | OUTPATIENT
Start: 2022-10-13

## 2022-12-20 ENCOUNTER — LAB ENCOUNTER (OUTPATIENT)
Dept: LAB | Age: 67
End: 2022-12-20
Attending: FAMILY MEDICINE
Payer: COMMERCIAL

## 2022-12-20 DIAGNOSIS — U07.1 COVID-19: ICD-10-CM

## 2022-12-21 LAB — SARS-COV-2 RNA RESP QL NAA+PROBE: NOT DETECTED

## 2022-12-26 ENCOUNTER — APPOINTMENT (OUTPATIENT)
Dept: GENERAL RADIOLOGY | Age: 67
End: 2022-12-26
Attending: NURSE PRACTITIONER
Payer: COMMERCIAL

## 2022-12-26 ENCOUNTER — OFFICE VISIT (OUTPATIENT)
Dept: FAMILY MEDICINE CLINIC | Facility: CLINIC | Age: 67
End: 2022-12-26
Payer: COMMERCIAL

## 2022-12-26 ENCOUNTER — HOSPITAL ENCOUNTER (OUTPATIENT)
Age: 67
Discharge: HOME | End: 2022-12-26
Payer: COMMERCIAL

## 2022-12-26 VITALS
SYSTOLIC BLOOD PRESSURE: 184 MMHG | HEART RATE: 82 BPM | TEMPERATURE: 97 F | RESPIRATION RATE: 18 BRPM | OXYGEN SATURATION: 99 % | DIASTOLIC BLOOD PRESSURE: 92 MMHG

## 2022-12-26 DIAGNOSIS — L03.011 CELLULITIS OF FINGER OF RIGHT HAND: ICD-10-CM

## 2022-12-26 DIAGNOSIS — M79.646 FINGER PAIN: Primary | ICD-10-CM

## 2022-12-26 DIAGNOSIS — Z53.21 PATIENT LEFT WITHOUT BEING SEEN: Primary | ICD-10-CM

## 2022-12-26 LAB — GLUCOSE BLDC GLUCOMTR-MCNC: 131 MG/DL (ref 70–99)

## 2022-12-26 PROCEDURE — 99213 OFFICE O/P EST LOW 20 MIN: CPT | Performed by: NURSE PRACTITIONER

## 2022-12-26 PROCEDURE — 3077F SYST BP >= 140 MM HG: CPT | Performed by: NURSE PRACTITIONER

## 2022-12-26 PROCEDURE — 73140 X-RAY EXAM OF FINGER(S): CPT | Performed by: NURSE PRACTITIONER

## 2022-12-26 PROCEDURE — 82962 GLUCOSE BLOOD TEST: CPT | Performed by: NURSE PRACTITIONER

## 2022-12-26 PROCEDURE — 3080F DIAST BP >= 90 MM HG: CPT | Performed by: NURSE PRACTITIONER

## 2022-12-26 RX ORDER — PREDNISONE 20 MG/1
40 TABLET ORAL DAILY
Qty: 10 TABLET | Refills: 0 | Status: SHIPPED | OUTPATIENT
Start: 2022-12-26 | End: 2022-12-31

## 2022-12-26 RX ORDER — CEFDINIR 300 MG/1
300 CAPSULE ORAL 2 TIMES DAILY
Qty: 14 CAPSULE | Refills: 0 | Status: SHIPPED | OUTPATIENT
Start: 2022-12-26 | End: 2023-01-02

## 2022-12-26 NOTE — PROGRESS NOTES
Pt reports to [facility] for new onset finger swelling. After being advised of WIC limitations upon check in, pt ultimately decided to go to IC instead, pt left without being seen.

## 2022-12-26 NOTE — ED INITIAL ASSESSMENT (HPI)
Pt reports right 3rd finger pain/swelling beginning Friday. No injury or trauma. Pt thinks he may have slept funny on the extremity.

## 2022-12-27 ENCOUNTER — OFFICE VISIT (OUTPATIENT)
Dept: INTERNAL MEDICINE CLINIC | Facility: CLINIC | Age: 67
End: 2022-12-27
Payer: COMMERCIAL

## 2022-12-27 VITALS
HEIGHT: 67 IN | TEMPERATURE: 98 F | OXYGEN SATURATION: 96 % | WEIGHT: 295.63 LBS | HEART RATE: 88 BPM | SYSTOLIC BLOOD PRESSURE: 158 MMHG | BODY MASS INDEX: 46.4 KG/M2 | DIASTOLIC BLOOD PRESSURE: 92 MMHG

## 2022-12-27 DIAGNOSIS — L03.011 CELLULITIS OF FINGER OF RIGHT HAND: Primary | ICD-10-CM

## 2022-12-27 DIAGNOSIS — M10.9 GOUTY ARTHROPATHY: ICD-10-CM

## 2022-12-27 DIAGNOSIS — Z23 NEED FOR INFLUENZA VACCINATION: ICD-10-CM

## 2022-12-27 DIAGNOSIS — I10 ESSENTIAL HYPERTENSION: ICD-10-CM

## 2022-12-27 PROCEDURE — 90471 IMMUNIZATION ADMIN: CPT | Performed by: FAMILY MEDICINE

## 2022-12-27 PROCEDURE — 3077F SYST BP >= 140 MM HG: CPT | Performed by: FAMILY MEDICINE

## 2022-12-27 PROCEDURE — 3008F BODY MASS INDEX DOCD: CPT | Performed by: FAMILY MEDICINE

## 2022-12-27 PROCEDURE — 3080F DIAST BP >= 90 MM HG: CPT | Performed by: FAMILY MEDICINE

## 2022-12-27 PROCEDURE — 90662 IIV NO PRSV INCREASED AG IM: CPT | Performed by: FAMILY MEDICINE

## 2022-12-27 PROCEDURE — 99214 OFFICE O/P EST MOD 30 MIN: CPT | Performed by: FAMILY MEDICINE

## 2022-12-27 RX ORDER — COLCHICINE 0.6 MG/1
TABLET ORAL
Qty: 90 TABLET | Refills: 0 | Status: SHIPPED | OUTPATIENT
Start: 2022-12-27

## 2022-12-27 RX ORDER — OLMESARTAN MEDOXOMIL 40 MG/1
40 TABLET ORAL DAILY
Qty: 90 TABLET | Refills: 0 | Status: SHIPPED | OUTPATIENT
Start: 2022-12-27

## 2022-12-27 NOTE — DISCHARGE INSTRUCTIONS
Take the antibiotic twice a day and the steroid daily. Ice. Elevate. See your doctor tomorrow as scheduled. Blood pressure is elevated today.   Discuss this with your doctor tomorrow

## 2022-12-30 ENCOUNTER — PATIENT MESSAGE (OUTPATIENT)
Dept: INTERNAL MEDICINE CLINIC | Facility: CLINIC | Age: 67
End: 2022-12-30

## 2023-01-01 NOTE — TELEPHONE ENCOUNTER
From: Keyur Leal  To: SHENA Jang  Sent: 12/30/2022 2:09 PM CST  Subject: Still Some Swelling    Good Afternoon,  Still some swelling, Yes it has gone down some but there is still no bending at the knuckle. I still have antibiotic to last thru Monday, but today I take my last Prednisone . Have a Jazmyn Gilmar Weekend and Thank you.

## 2023-01-03 ENCOUNTER — PATIENT MESSAGE (OUTPATIENT)
Dept: INTERNAL MEDICINE CLINIC | Facility: CLINIC | Age: 68
End: 2023-01-03

## 2023-01-03 RX ORDER — PREDNISONE 10 MG/1
TABLET ORAL
Qty: 20 TABLET | Refills: 0 | Status: SHIPPED | OUTPATIENT
Start: 2023-01-03

## 2023-01-03 RX ORDER — CEFDINIR 300 MG/1
300 CAPSULE ORAL 2 TIMES DAILY
Qty: 14 CAPSULE | Refills: 0 | Status: SHIPPED | OUTPATIENT
Start: 2023-01-03 | End: 2023-01-10

## 2023-01-03 NOTE — TELEPHONE ENCOUNTER
Hard to tell by photos sent if still infected, it does still appear to be a little swollen. Since swollen and still warm will continue medications he was on for another week. Medications sent to his pharmacy. If still having issues in a week , Pt needs to come back into the office to be checked.

## 2023-01-03 NOTE — TELEPHONE ENCOUNTER
From: Fred Riedel  To: SHENA Alicea  Sent: 1/3/2023 10:21 AM CST  Subject: Middle Finger. Will James)    Thanks for Checking. I can't bend finger all the way or make a fist, but it feels a lot better.

## 2023-01-03 NOTE — TELEPHONE ENCOUNTER
Spoke with patient via phone. Informed of 's recommendations. Patient verbalized understanding. Confirms he will pick prescriptions up and contact office if in 1 week symptoms not resolved. Denies further questions.

## 2023-01-03 NOTE — TELEPHONE ENCOUNTER
SM- Please Advise    Spoke with patient via phone for further triage. Patient reports \"My right hand (middle finger) is doing much better now than it was over the weekend\". When asked confirmed- \"finger is still stiff- but I have a little more mobility with bending it than I did when I saw [facility]\". Denies redness/discoloration, but feels \"skin over knuckle joint of right middle finger does feel a little warmed to touch and is mildly swollen\". Also reports \"few random episodes of shooting pain to right palm, lasting for few seconds this past weekend- none currently\"  Santi any numbness or tingling. Completed course of antibiotics (cefdinir x 7 days) and prednisone (40mg x 5 days). Patient to send [facility] with photo.

## 2023-03-07 ENCOUNTER — TELEMEDICINE (OUTPATIENT)
Dept: INTERNAL MEDICINE CLINIC | Facility: CLINIC | Age: 68
End: 2023-03-07

## 2023-03-07 DIAGNOSIS — M25.642 STIFFNESS OF LEFT HAND JOINT: Primary | ICD-10-CM

## 2023-03-07 DIAGNOSIS — M25.641 STIFFNESS OF RIGHT HAND JOINT: ICD-10-CM

## 2023-03-07 PROCEDURE — 99212 OFFICE O/P EST SF 10 MIN: CPT | Performed by: FAMILY MEDICINE

## 2023-03-21 DIAGNOSIS — M10.9 GOUTY ARTHROPATHY: ICD-10-CM

## 2023-03-21 RX ORDER — COLCHICINE 0.6 MG/1
TABLET ORAL
Qty: 90 TABLET | Refills: 0 | Status: SHIPPED | OUTPATIENT
Start: 2023-03-21

## 2023-04-11 ENCOUNTER — OFFICE VISIT (OUTPATIENT)
Dept: INTERNAL MEDICINE CLINIC | Facility: CLINIC | Age: 68
End: 2023-04-11
Payer: COMMERCIAL

## 2023-04-11 VITALS
RESPIRATION RATE: 18 BRPM | TEMPERATURE: 98 F | WEIGHT: 300 LBS | BODY MASS INDEX: 47.09 KG/M2 | DIASTOLIC BLOOD PRESSURE: 86 MMHG | SYSTOLIC BLOOD PRESSURE: 138 MMHG | HEIGHT: 67 IN | OXYGEN SATURATION: 98 % | HEART RATE: 63 BPM

## 2023-04-11 DIAGNOSIS — M79.641 BILATERAL HAND PAIN: Primary | ICD-10-CM

## 2023-04-11 DIAGNOSIS — I10 ESSENTIAL HYPERTENSION: ICD-10-CM

## 2023-04-11 DIAGNOSIS — M79.642 BILATERAL HAND PAIN: Primary | ICD-10-CM

## 2023-04-11 PROCEDURE — 99213 OFFICE O/P EST LOW 20 MIN: CPT | Performed by: FAMILY MEDICINE

## 2023-04-11 PROCEDURE — 3079F DIAST BP 80-89 MM HG: CPT | Performed by: FAMILY MEDICINE

## 2023-04-11 PROCEDURE — 3008F BODY MASS INDEX DOCD: CPT | Performed by: FAMILY MEDICINE

## 2023-04-11 PROCEDURE — 3075F SYST BP GE 130 - 139MM HG: CPT | Performed by: FAMILY MEDICINE

## 2023-04-11 RX ORDER — DIAZEPAM 5 MG/1
TABLET ORAL
COMMUNITY
End: 2023-04-11

## 2023-04-11 RX ORDER — GENTAMICIN SULFATE 3 MG/ML
SOLUTION/ DROPS OPHTHALMIC
COMMUNITY
End: 2023-04-11 | Stop reason: ALTCHOICE

## 2023-04-11 RX ORDER — METHOCARBAMOL 500 MG/1
TABLET, FILM COATED ORAL
COMMUNITY

## 2023-04-11 RX ORDER — HYDROCODONE BITARTRATE AND ACETAMINOPHEN 10; 325 MG/1; MG/1
TABLET ORAL
COMMUNITY

## 2023-04-11 RX ORDER — PSEUDOEPHEDRINE HCL 30 MG
TABLET ORAL
COMMUNITY
End: 2023-04-11

## 2023-04-11 RX ORDER — HYDROCODONE BITARTRATE AND ACETAMINOPHEN 5; 325 MG/1; MG/1
TABLET ORAL
COMMUNITY

## 2023-04-11 RX ORDER — CYCLOBENZAPRINE HCL 10 MG
TABLET ORAL
COMMUNITY
End: 2023-04-11 | Stop reason: ALTCHOICE

## 2023-04-11 RX ORDER — ERYTHROMYCIN 5 MG/G
OINTMENT OPHTHALMIC
COMMUNITY
End: 2023-04-11 | Stop reason: ALTCHOICE

## 2023-04-11 RX ORDER — NAPROXEN 500 MG/1
TABLET ORAL
COMMUNITY
End: 2023-04-11

## 2023-04-11 RX ORDER — CEFDINIR 300 MG/1
CAPSULE ORAL
COMMUNITY

## 2023-04-11 RX ORDER — AMOXICILLIN AND CLAVULANATE POTASSIUM 875; 125 MG/1; MG/1
TABLET, FILM COATED ORAL
COMMUNITY
End: 2023-04-11 | Stop reason: ALTCHOICE

## 2023-04-11 RX ORDER — CEFADROXIL 500 MG/1
CAPSULE ORAL
COMMUNITY

## 2023-04-11 RX ORDER — DICLOFENAC SODIUM 75 MG/1
TABLET, DELAYED RELEASE ORAL
COMMUNITY
End: 2023-04-11

## 2023-05-11 ENCOUNTER — OFFICE VISIT (OUTPATIENT)
Dept: INTERNAL MEDICINE CLINIC | Facility: CLINIC | Age: 68
End: 2023-05-11
Payer: COMMERCIAL

## 2023-05-11 ENCOUNTER — LAB ENCOUNTER (OUTPATIENT)
Dept: LAB | Age: 68
End: 2023-05-11
Attending: FAMILY MEDICINE
Payer: COMMERCIAL

## 2023-05-11 VITALS
DIASTOLIC BLOOD PRESSURE: 88 MMHG | SYSTOLIC BLOOD PRESSURE: 138 MMHG | TEMPERATURE: 98 F | HEART RATE: 86 BPM | HEIGHT: 67 IN | BODY MASS INDEX: 46.77 KG/M2 | RESPIRATION RATE: 20 BRPM | WEIGHT: 298 LBS

## 2023-05-11 DIAGNOSIS — G89.29 CHRONIC PAIN OF LEFT THUMB: ICD-10-CM

## 2023-05-11 DIAGNOSIS — I10 ESSENTIAL HYPERTENSION: ICD-10-CM

## 2023-05-11 DIAGNOSIS — M79.641 BILATERAL HAND PAIN: ICD-10-CM

## 2023-05-11 DIAGNOSIS — M79.641 BILATERAL HAND PAIN: Primary | ICD-10-CM

## 2023-05-11 DIAGNOSIS — M25.531 RIGHT WRIST PAIN: ICD-10-CM

## 2023-05-11 DIAGNOSIS — M65.352 TRIGGER LITTLE FINGER OF LEFT HAND: ICD-10-CM

## 2023-05-11 DIAGNOSIS — M79.642 BILATERAL HAND PAIN: Primary | ICD-10-CM

## 2023-05-11 DIAGNOSIS — M79.642 BILATERAL HAND PAIN: ICD-10-CM

## 2023-05-11 DIAGNOSIS — M79.645 CHRONIC PAIN OF LEFT THUMB: ICD-10-CM

## 2023-05-11 LAB
CRP SERPL-MCNC: 0.38 MG/DL (ref ?–0.3)
ERYTHROCYTE [SEDIMENTATION RATE] IN BLOOD: 25 MM/HR
RHEUMATOID FACT SERPL-ACNC: <10 IU/ML (ref ?–15)
URATE SERPL-MCNC: 10 MG/DL

## 2023-05-11 PROCEDURE — 3079F DIAST BP 80-89 MM HG: CPT | Performed by: FAMILY MEDICINE

## 2023-05-11 PROCEDURE — 36415 COLL VENOUS BLD VENIPUNCTURE: CPT

## 2023-05-11 PROCEDURE — 84550 ASSAY OF BLOOD/URIC ACID: CPT

## 2023-05-11 PROCEDURE — 99213 OFFICE O/P EST LOW 20 MIN: CPT | Performed by: FAMILY MEDICINE

## 2023-05-11 PROCEDURE — 3075F SYST BP GE 130 - 139MM HG: CPT | Performed by: FAMILY MEDICINE

## 2023-05-11 PROCEDURE — 86140 C-REACTIVE PROTEIN: CPT

## 2023-05-11 PROCEDURE — 3008F BODY MASS INDEX DOCD: CPT | Performed by: FAMILY MEDICINE

## 2023-05-11 PROCEDURE — 86431 RHEUMATOID FACTOR QUANT: CPT

## 2023-05-11 PROCEDURE — 85652 RBC SED RATE AUTOMATED: CPT

## 2023-05-11 RX ORDER — METHYLPREDNISOLONE 4 MG/1
TABLET ORAL
Qty: 1 EACH | Refills: 0 | Status: SHIPPED | OUTPATIENT
Start: 2023-05-11

## 2023-05-11 RX ORDER — AMLODIPINE BESYLATE 5 MG/1
5 TABLET ORAL DAILY
Qty: 90 TABLET | Refills: 0 | Status: SHIPPED | OUTPATIENT
Start: 2023-05-11

## 2023-06-03 DIAGNOSIS — I10 ESSENTIAL HYPERTENSION: ICD-10-CM

## 2023-06-04 RX ORDER — CARVEDILOL 25 MG/1
TABLET ORAL
Qty: 180 TABLET | Refills: 0 | Status: SHIPPED | OUTPATIENT
Start: 2023-06-04

## 2023-08-06 DIAGNOSIS — I10 ESSENTIAL HYPERTENSION: ICD-10-CM

## 2023-08-06 RX ORDER — AMLODIPINE BESYLATE 5 MG/1
5 TABLET ORAL DAILY
Qty: 90 TABLET | Refills: 0 | Status: SHIPPED | OUTPATIENT
Start: 2023-08-06

## 2023-08-09 ENCOUNTER — TELEPHONE (OUTPATIENT)
Dept: INTERNAL MEDICINE CLINIC | Facility: CLINIC | Age: 68
End: 2023-08-09

## 2023-09-12 ENCOUNTER — TELEPHONE (OUTPATIENT)
Dept: INTERNAL MEDICINE CLINIC | Facility: CLINIC | Age: 68
End: 2023-09-12

## 2023-09-15 ENCOUNTER — HOSPITAL ENCOUNTER (OUTPATIENT)
Age: 68
Discharge: HOME | End: 2023-09-15
Payer: COMMERCIAL

## 2023-09-15 VITALS
OXYGEN SATURATION: 96 % | RESPIRATION RATE: 16 BRPM | HEART RATE: 67 BPM | SYSTOLIC BLOOD PRESSURE: 156 MMHG | DIASTOLIC BLOOD PRESSURE: 78 MMHG | TEMPERATURE: 97 F

## 2023-09-15 DIAGNOSIS — M10.9 ACUTE GOUT OF LEFT HAND, UNSPECIFIED CAUSE: Primary | ICD-10-CM

## 2023-09-15 DIAGNOSIS — R03.0 ELEVATED BLOOD PRESSURE READING: ICD-10-CM

## 2023-09-15 RX ORDER — METHYLPREDNISOLONE 4 MG/1
TABLET ORAL
Qty: 21 TABLET | Refills: 0 | Status: SHIPPED | OUTPATIENT
Start: 2023-09-15

## 2023-10-03 ENCOUNTER — PATIENT MESSAGE (OUTPATIENT)
Dept: INTERNAL MEDICINE CLINIC | Facility: CLINIC | Age: 68
End: 2023-10-03

## 2023-10-03 NOTE — TELEPHONE ENCOUNTER
From: Cata Rangel  To:  Lisa Neal  Sent: 10/3/2023 5:26 PM CDT  Subject: Test Results    For the Records

## 2023-10-03 NOTE — TELEPHONE ENCOUNTER
VANDANA GARCIA, for your review  -Patient provided copy of EMG completed 9/5 with Alec Kumari at [facility]

## 2023-10-10 ENCOUNTER — TELEPHONE (OUTPATIENT)
Dept: INTERNAL MEDICINE CLINIC | Facility: CLINIC | Age: 68
End: 2023-10-10

## 2023-10-10 ENCOUNTER — OFFICE VISIT (OUTPATIENT)
Dept: INTERNAL MEDICINE CLINIC | Facility: CLINIC | Age: 68
End: 2023-10-10
Payer: COMMERCIAL

## 2023-10-10 VITALS
BODY MASS INDEX: 46.91 KG/M2 | WEIGHT: 295.38 LBS | HEART RATE: 65 BPM | HEIGHT: 66.54 IN | OXYGEN SATURATION: 96 % | SYSTOLIC BLOOD PRESSURE: 140 MMHG | TEMPERATURE: 98 F | DIASTOLIC BLOOD PRESSURE: 88 MMHG

## 2023-10-10 DIAGNOSIS — E11.9 DIABETES MELLITUS WITHOUT COMPLICATION (HCC): ICD-10-CM

## 2023-10-10 DIAGNOSIS — M10.9 GOUTY ARTHROPATHY: ICD-10-CM

## 2023-10-10 DIAGNOSIS — Z00.00 ROUTINE GENERAL MEDICAL EXAMINATION AT A HEALTH CARE FACILITY: Primary | ICD-10-CM

## 2023-10-10 DIAGNOSIS — Z12.11 ENCOUNTER FOR SCREENING COLONOSCOPY: ICD-10-CM

## 2023-10-10 DIAGNOSIS — Z23 NEED FOR INFLUENZA VACCINATION: ICD-10-CM

## 2023-10-10 DIAGNOSIS — I10 ESSENTIAL HYPERTENSION: ICD-10-CM

## 2023-10-10 PROCEDURE — 3077F SYST BP >= 140 MM HG: CPT | Performed by: FAMILY MEDICINE

## 2023-10-10 PROCEDURE — 90471 IMMUNIZATION ADMIN: CPT | Performed by: FAMILY MEDICINE

## 2023-10-10 PROCEDURE — 3008F BODY MASS INDEX DOCD: CPT | Performed by: FAMILY MEDICINE

## 2023-10-10 PROCEDURE — 90662 IIV NO PRSV INCREASED AG IM: CPT | Performed by: FAMILY MEDICINE

## 2023-10-10 PROCEDURE — 99213 OFFICE O/P EST LOW 20 MIN: CPT | Performed by: FAMILY MEDICINE

## 2023-10-10 PROCEDURE — 3079F DIAST BP 80-89 MM HG: CPT | Performed by: FAMILY MEDICINE

## 2023-10-10 PROCEDURE — 99397 PER PM REEVAL EST PAT 65+ YR: CPT | Performed by: FAMILY MEDICINE

## 2023-10-10 RX ORDER — IRBESARTAN 150 MG/1
150 TABLET ORAL NIGHTLY
Qty: 90 TABLET | Refills: 0 | Status: SHIPPED | OUTPATIENT
Start: 2023-10-10

## 2023-10-10 NOTE — TELEPHONE ENCOUNTER
[facility] filled out [facility] since pt was seen today. Faxed and placed in scan and copy placed in accordion above sink.

## 2023-10-11 ENCOUNTER — PATIENT MESSAGE (OUTPATIENT)
Dept: INTERNAL MEDICINE CLINIC | Facility: CLINIC | Age: 68
End: 2023-10-11

## 2023-10-11 DIAGNOSIS — E11.9 DIABETES MELLITUS WITHOUT COMPLICATION (HCC): ICD-10-CM

## 2023-10-11 RX ORDER — BLOOD-GLUCOSE METER
1 KIT MISCELLANEOUS 2 TIMES DAILY
Qty: 1 KIT | Refills: 0 | Status: SHIPPED | OUTPATIENT
Start: 2023-10-11 | End: 2024-10-10

## 2023-10-11 RX ORDER — LANCETS 33 GAUGE
1 EACH MISCELLANEOUS DAILY
Qty: 100 EACH | Refills: 1 | Status: SHIPPED | OUTPATIENT
Start: 2023-10-11 | End: 2024-10-10

## 2023-10-11 RX ORDER — BLOOD SUGAR DIAGNOSTIC
STRIP MISCELLANEOUS
Qty: 100 STRIP | Refills: 1 | Status: SHIPPED | OUTPATIENT
Start: 2023-10-11 | End: 2024-10-10

## 2023-10-11 RX ORDER — BLOOD SUGAR DIAGNOSTIC
STRIP MISCELLANEOUS
Qty: 100 STRIP | Refills: 1 | Status: SHIPPED | OUTPATIENT
Start: 2023-10-11 | End: 2023-10-11

## 2023-10-11 RX ORDER — LANCETS 33 GAUGE
1 EACH MISCELLANEOUS DAILY
Qty: 100 EACH | Refills: 1 | Status: SHIPPED | OUTPATIENT
Start: 2023-10-11 | End: 2023-10-11

## 2023-10-11 RX ORDER — BLOOD-GLUCOSE METER
1 KIT MISCELLANEOUS 2 TIMES DAILY
Qty: 1 KIT | Refills: 0 | COMMUNITY
Start: 2023-10-11 | End: 2023-10-11

## 2023-10-18 ENCOUNTER — TELEPHONE (OUTPATIENT)
Dept: INTERNAL MEDICINE CLINIC | Facility: CLINIC | Age: 68
End: 2023-10-18

## 2023-10-18 NOTE — TELEPHONE ENCOUNTER
Patient called to let us know patient's friend, Anahi Cronin will be coming in to  paperwork at [facility].

## 2023-11-03 ENCOUNTER — TELEPHONE (OUTPATIENT)
Dept: INTERNAL MEDICINE CLINIC | Facility: CLINIC | Age: 68
End: 2023-11-03

## 2023-11-03 ENCOUNTER — LAB ENCOUNTER (OUTPATIENT)
Dept: LAB | Age: 68
End: 2023-11-03
Attending: FAMILY MEDICINE
Payer: COMMERCIAL

## 2023-11-03 DIAGNOSIS — E11.9 DIABETES MELLITUS WITHOUT COMPLICATION (HCC): ICD-10-CM

## 2023-11-03 DIAGNOSIS — Z00.00 ROUTINE GENERAL MEDICAL EXAMINATION AT A HEALTH CARE FACILITY: ICD-10-CM

## 2023-11-03 LAB
ALBUMIN SERPL-MCNC: 3.7 G/DL (ref 3.4–5)
ALBUMIN/GLOB SERPL: 1 {RATIO} (ref 1–2)
ALP LIVER SERPL-CCNC: 161 U/L
ALT SERPL-CCNC: 53 U/L
ANION GAP SERPL CALC-SCNC: 1 MMOL/L (ref 0–18)
AST SERPL-CCNC: 31 U/L (ref 15–37)
BASOPHILS # BLD AUTO: 0.04 X10(3) UL (ref 0–0.2)
BASOPHILS NFR BLD AUTO: 0.6 %
BILIRUB SERPL-MCNC: 1.2 MG/DL (ref 0.1–2)
BUN BLD-MCNC: 22 MG/DL (ref 9–23)
CALCIUM BLD-MCNC: 9 MG/DL (ref 8.5–10.1)
CHLORIDE SERPL-SCNC: 107 MMOL/L (ref 98–112)
CHOLEST SERPL-MCNC: 177 MG/DL (ref ?–200)
CO2 SERPL-SCNC: 31 MMOL/L (ref 21–32)
CREAT BLD-MCNC: 1.61 MG/DL
CREAT UR-SCNC: 114 MG/DL
EGFRCR SERPLBLD CKD-EPI 2021: 47 ML/MIN/1.73M2 (ref 60–?)
EOSINOPHIL # BLD AUTO: 0.12 X10(3) UL (ref 0–0.7)
EOSINOPHIL NFR BLD AUTO: 1.8 %
ERYTHROCYTE [DISTWIDTH] IN BLOOD BY AUTOMATED COUNT: 13.7 %
EST. AVERAGE GLUCOSE BLD GHB EST-MCNC: 151 MG/DL (ref 68–126)
FASTING PATIENT LIPID ANSWER: YES
FASTING STATUS PATIENT QL REPORTED: YES
GLOBULIN PLAS-MCNC: 3.8 G/DL (ref 2.8–4.4)
GLUCOSE BLD-MCNC: 136 MG/DL (ref 70–99)
HBA1C MFR BLD: 6.9 % (ref ?–5.7)
HCT VFR BLD AUTO: 43.4 %
HDLC SERPL-MCNC: 47 MG/DL (ref 40–59)
HGB BLD-MCNC: 14.1 G/DL
IMM GRANULOCYTES # BLD AUTO: 0.01 X10(3) UL (ref 0–1)
IMM GRANULOCYTES NFR BLD: 0.1 %
LDLC SERPL CALC-MCNC: 105 MG/DL (ref ?–100)
LYMPHOCYTES # BLD AUTO: 2.11 X10(3) UL (ref 1–4)
LYMPHOCYTES NFR BLD AUTO: 30.8 %
MCH RBC QN AUTO: 30.4 PG (ref 26–34)
MCHC RBC AUTO-ENTMCNC: 32.5 G/DL (ref 31–37)
MCV RBC AUTO: 93.5 FL
MICROALBUMIN UR-MCNC: 33.7 MG/DL
MICROALBUMIN/CREAT 24H UR-RTO: 295.6 UG/MG (ref ?–30)
MONOCYTES # BLD AUTO: 0.68 X10(3) UL (ref 0.1–1)
MONOCYTES NFR BLD AUTO: 9.9 %
NEUTROPHILS # BLD AUTO: 3.89 X10 (3) UL (ref 1.5–7.7)
NEUTROPHILS # BLD AUTO: 3.89 X10(3) UL (ref 1.5–7.7)
NEUTROPHILS NFR BLD AUTO: 56.8 %
NONHDLC SERPL-MCNC: 130 MG/DL (ref ?–130)
OSMOLALITY SERPL CALC.SUM OF ELEC: 293 MOSM/KG (ref 275–295)
PLATELET # BLD AUTO: 196 10(3)UL (ref 150–450)
POTASSIUM SERPL-SCNC: 4.4 MMOL/L (ref 3.5–5.1)
PROT SERPL-MCNC: 7.5 G/DL (ref 6.4–8.2)
RBC # BLD AUTO: 4.64 X10(6)UL
SODIUM SERPL-SCNC: 139 MMOL/L (ref 136–145)
TRIGL SERPL-MCNC: 142 MG/DL (ref 30–149)
TSI SER-ACNC: 1.4 MIU/ML (ref 0.36–3.74)
VIT D+METAB SERPL-MCNC: 22.6 NG/ML (ref 30–100)
VLDLC SERPL CALC-MCNC: 24 MG/DL (ref 0–30)
WBC # BLD AUTO: 6.9 X10(3) UL (ref 4–11)

## 2023-11-03 PROCEDURE — 82570 ASSAY OF URINE CREATININE: CPT

## 2023-11-03 PROCEDURE — 36415 COLL VENOUS BLD VENIPUNCTURE: CPT

## 2023-11-03 PROCEDURE — 3044F HG A1C LEVEL LT 7.0%: CPT | Performed by: FAMILY MEDICINE

## 2023-11-03 PROCEDURE — 80061 LIPID PANEL: CPT

## 2023-11-03 PROCEDURE — 83036 HEMOGLOBIN GLYCOSYLATED A1C: CPT

## 2023-11-03 PROCEDURE — 82043 UR ALBUMIN QUANTITATIVE: CPT

## 2023-11-03 PROCEDURE — 80053 COMPREHEN METABOLIC PANEL: CPT

## 2023-11-03 PROCEDURE — 85025 COMPLETE CBC W/AUTO DIFF WBC: CPT

## 2023-11-03 PROCEDURE — 84443 ASSAY THYROID STIM HORMONE: CPT

## 2023-11-03 PROCEDURE — 82306 VITAMIN D 25 HYDROXY: CPT

## 2023-11-07 DIAGNOSIS — E55.9 VITAMIN D DEFICIENCY: ICD-10-CM

## 2023-11-07 DIAGNOSIS — R74.8 ELEVATED ALKALINE PHOSPHATASE LEVEL: Primary | ICD-10-CM

## 2023-11-07 DIAGNOSIS — E78.00 ELEVATED CHOLESTEROL: ICD-10-CM

## 2023-11-07 DIAGNOSIS — E11.9 DIABETES MELLITUS WITHOUT COMPLICATION (HCC): ICD-10-CM

## 2023-11-07 RX ORDER — ERGOCALCIFEROL 1.25 MG/1
50000 CAPSULE ORAL WEEKLY
Qty: 12 CAPSULE | Refills: 1 | Status: SHIPPED | OUTPATIENT
Start: 2023-11-07

## 2023-11-07 NOTE — TELEPHONE ENCOUNTER
----- Message from SHENA Negro sent at 11/4/2023  1:07 PM CDT -----  Patient has Vit D deficiency. Pt needs vit D 73976 IU once a week for 6 months, after 2000 IU daily!!!. Recheck vit D in 6 months. Microalbumin improving. .Pt to continue to work on BP and DM. Also avoid too many NSAID's, or high protein diets. Kidneys stable. Alk phos up. Alkaline Phosphatase level is up. To help lower this level eat foods rich in vit D, and copper like milk,eggs,salmon,tuna,cashews. Pt should also avoid NSAID, Narcotics, steroids drugs. Recheck level again in 1 month. LDL and non HDL improving. Pt to continue to eat a low fat diet. Hgba1c up 6.9, average glucose 151. Pt to continue low carb diet and taking his metformin. Rest of labs ok.  Repeat lipids, CMP, Hgba1c, vit D in 6 months

## 2023-11-08 RX ORDER — METFORMIN HYDROCHLORIDE 500 MG/1
1000 TABLET, EXTENDED RELEASE ORAL
Qty: 180 TABLET | Refills: 0 | Status: SHIPPED | OUTPATIENT
Start: 2023-11-08

## 2023-11-08 NOTE — TELEPHONE ENCOUNTER
Spoke to patient via phone regarding results/recommendations from provider. Patient voiced understanding. Refill needed on Metformin if appropriate.

## 2023-11-26 ENCOUNTER — PATIENT MESSAGE (OUTPATIENT)
Dept: INTERNAL MEDICINE CLINIC | Facility: CLINIC | Age: 68
End: 2023-11-26

## 2023-11-29 ENCOUNTER — OFFICE VISIT (OUTPATIENT)
Dept: INTERNAL MEDICINE CLINIC | Facility: CLINIC | Age: 68
End: 2023-11-29
Payer: COMMERCIAL

## 2023-11-29 ENCOUNTER — LAB ENCOUNTER (OUTPATIENT)
Dept: LAB | Age: 68
End: 2023-11-29
Attending: FAMILY MEDICINE
Payer: COMMERCIAL

## 2023-11-29 VITALS
RESPIRATION RATE: 20 BRPM | OXYGEN SATURATION: 98 % | SYSTOLIC BLOOD PRESSURE: 136 MMHG | DIASTOLIC BLOOD PRESSURE: 78 MMHG | BODY MASS INDEX: 47.36 KG/M2 | HEIGHT: 66.54 IN | HEART RATE: 78 BPM | WEIGHT: 298.19 LBS

## 2023-11-29 DIAGNOSIS — Z01.818 PRE-OP EXAM: ICD-10-CM

## 2023-11-29 DIAGNOSIS — M79.642 BILATERAL HAND PAIN: ICD-10-CM

## 2023-11-29 DIAGNOSIS — M79.641 BILATERAL HAND PAIN: ICD-10-CM

## 2023-11-29 DIAGNOSIS — Z01.818 PRE-OP EXAM: Primary | ICD-10-CM

## 2023-11-29 LAB
ANION GAP SERPL CALC-SCNC: 1 MMOL/L (ref 0–18)
ATRIAL RATE: 62 BPM
BASOPHILS # BLD AUTO: 0.03 X10(3) UL (ref 0–0.2)
BASOPHILS NFR BLD AUTO: 0.5 %
BUN BLD-MCNC: 14 MG/DL (ref 9–23)
CALCIUM BLD-MCNC: 9 MG/DL (ref 8.5–10.1)
CHLORIDE SERPL-SCNC: 107 MMOL/L (ref 98–112)
CO2 SERPL-SCNC: 34 MMOL/L (ref 21–32)
CREAT BLD-MCNC: 1.73 MG/DL
EGFRCR SERPLBLD CKD-EPI 2021: 43 ML/MIN/1.73M2 (ref 60–?)
EOSINOPHIL # BLD AUTO: 0.11 X10(3) UL (ref 0–0.7)
EOSINOPHIL NFR BLD AUTO: 1.8 %
ERYTHROCYTE [DISTWIDTH] IN BLOOD BY AUTOMATED COUNT: 13.5 %
FASTING STATUS PATIENT QL REPORTED: NO
GLUCOSE BLD-MCNC: 129 MG/DL (ref 70–99)
HCT VFR BLD AUTO: 42.9 %
HGB BLD-MCNC: 14 G/DL
IMM GRANULOCYTES # BLD AUTO: 0.02 X10(3) UL (ref 0–1)
IMM GRANULOCYTES NFR BLD: 0.3 %
LYMPHOCYTES # BLD AUTO: 1.91 X10(3) UL (ref 1–4)
LYMPHOCYTES NFR BLD AUTO: 30.6 %
MCH RBC QN AUTO: 30.6 PG (ref 26–34)
MCHC RBC AUTO-ENTMCNC: 32.6 G/DL (ref 31–37)
MCV RBC AUTO: 93.7 FL
MONOCYTES # BLD AUTO: 0.58 X10(3) UL (ref 0.1–1)
MONOCYTES NFR BLD AUTO: 9.3 %
NEUTROPHILS # BLD AUTO: 3.59 X10 (3) UL (ref 1.5–7.7)
NEUTROPHILS # BLD AUTO: 3.59 X10(3) UL (ref 1.5–7.7)
NEUTROPHILS NFR BLD AUTO: 57.5 %
OSMOLALITY SERPL CALC.SUM OF ELEC: 296 MOSM/KG (ref 275–295)
P AXIS: 47 DEGREES
P-R INTERVAL: 180 MS
PLATELET # BLD AUTO: 194 10(3)UL (ref 150–450)
POTASSIUM SERPL-SCNC: 4 MMOL/L (ref 3.5–5.1)
Q-T INTERVAL: 388 MS
QRS DURATION: 68 MS
QTC CALCULATION (BEZET): 393 MS
R AXIS: 30 DEGREES
RBC # BLD AUTO: 4.58 X10(6)UL
SODIUM SERPL-SCNC: 142 MMOL/L (ref 136–145)
T AXIS: 59 DEGREES
VENTRICULAR RATE: 62 BPM
WBC # BLD AUTO: 6.2 X10(3) UL (ref 4–11)

## 2023-11-29 PROCEDURE — 36415 COLL VENOUS BLD VENIPUNCTURE: CPT

## 2023-11-29 PROCEDURE — 3078F DIAST BP <80 MM HG: CPT | Performed by: FAMILY MEDICINE

## 2023-11-29 PROCEDURE — 3075F SYST BP GE 130 - 139MM HG: CPT | Performed by: FAMILY MEDICINE

## 2023-11-29 PROCEDURE — 3008F BODY MASS INDEX DOCD: CPT | Performed by: FAMILY MEDICINE

## 2023-11-29 PROCEDURE — 85025 COMPLETE CBC W/AUTO DIFF WBC: CPT

## 2023-11-29 PROCEDURE — 80048 BASIC METABOLIC PNL TOTAL CA: CPT

## 2023-11-29 PROCEDURE — 93000 ELECTROCARDIOGRAM COMPLETE: CPT | Performed by: FAMILY MEDICINE

## 2023-11-29 PROCEDURE — 99214 OFFICE O/P EST MOD 30 MIN: CPT | Performed by: FAMILY MEDICINE

## 2023-12-01 NOTE — TELEPHONE ENCOUNTER
Faxed patients pre-op paperwork with confirmation    Placed in accordion folder in Pod 3 and to scan

## 2023-12-02 DIAGNOSIS — I10 ESSENTIAL HYPERTENSION: ICD-10-CM

## 2023-12-03 RX ORDER — AMLODIPINE BESYLATE 5 MG/1
5 TABLET ORAL DAILY
Qty: 90 TABLET | Refills: 0 | Status: SHIPPED | OUTPATIENT
Start: 2023-12-03

## 2023-12-03 RX ORDER — CARVEDILOL 25 MG/1
25 TABLET ORAL 2 TIMES DAILY WITH MEALS
Qty: 180 TABLET | Refills: 0 | Status: SHIPPED | OUTPATIENT
Start: 2023-12-03

## 2023-12-26 ENCOUNTER — HOSPITAL ENCOUNTER (OUTPATIENT)
Age: 68
Discharge: HOME | End: 2023-12-26
Payer: COMMERCIAL

## 2023-12-26 ENCOUNTER — APPOINTMENT (OUTPATIENT)
Dept: GENERAL RADIOLOGY | Age: 68
End: 2023-12-26
Attending: NURSE PRACTITIONER
Payer: COMMERCIAL

## 2023-12-26 VITALS
TEMPERATURE: 98 F | HEART RATE: 76 BPM | OXYGEN SATURATION: 98 % | WEIGHT: 280 LBS | HEIGHT: 67 IN | BODY MASS INDEX: 43.95 KG/M2 | DIASTOLIC BLOOD PRESSURE: 96 MMHG | SYSTOLIC BLOOD PRESSURE: 155 MMHG | RESPIRATION RATE: 20 BRPM

## 2023-12-26 DIAGNOSIS — M79.645 THUMB PAIN, LEFT: Primary | ICD-10-CM

## 2023-12-26 PROCEDURE — 99213 OFFICE O/P EST LOW 20 MIN: CPT | Performed by: NURSE PRACTITIONER

## 2023-12-26 PROCEDURE — 73140 X-RAY EXAM OF FINGER(S): CPT | Performed by: NURSE PRACTITIONER

## 2023-12-27 NOTE — DISCHARGE INSTRUCTIONS
Ice and elevate. Tylenol as needed for pain. Follow-up with the hand specialist.  Return for any concerns.

## 2023-12-30 DIAGNOSIS — I10 ESSENTIAL HYPERTENSION: ICD-10-CM

## 2024-01-01 RX ORDER — IRBESARTAN 150 MG/1
150 TABLET ORAL NIGHTLY
Qty: 90 TABLET | Refills: 0 | Status: SHIPPED | OUTPATIENT
Start: 2024-01-01

## 2024-02-02 NOTE — TELEPHONE ENCOUNTER
Caller: patient     Doctor: Humberto     Reason for call: patient is asking about taking something for anxiety prior to her USGI on 2/6,  she's afraid she will move and the needle will go into the wrong spot plus needles frighten her.    Preferred pharmacy is [facility] on [address] in [place].     Call back#: [phone]   Patient called back for test results please call

## 2024-02-09 DIAGNOSIS — E11.9 DIABETES MELLITUS WITHOUT COMPLICATION (HCC): ICD-10-CM

## 2024-02-09 RX ORDER — METFORMIN HYDROCHLORIDE 500 MG/1
1000 TABLET, EXTENDED RELEASE ORAL
Qty: 180 TABLET | Refills: 0 | Status: SHIPPED | OUTPATIENT
Start: 2024-02-09

## 2024-02-09 NOTE — TELEPHONE ENCOUNTER
Metformin  mg    Diabetes Medication Protocol [id number]/09/2024 02:06 AM   Protocol Details EGFRCR or GFRAA > 50      Filled 11-8-23  Qty 180  0 refills  No future appointments.  LOV 10-10-23 SM  Labs 11-3-23 A1c/ microalb./Creat.

## 2024-02-22 ENCOUNTER — TELEMEDICINE (OUTPATIENT)
Dept: INTERNAL MEDICINE CLINIC | Facility: CLINIC | Age: 69
End: 2024-02-22
Payer: COMMERCIAL

## 2024-02-22 DIAGNOSIS — A08.4 STOMACH FLU: Primary | ICD-10-CM

## 2024-02-22 DIAGNOSIS — R14.0 FLATULENCE/GAS PAIN/BELCHING: ICD-10-CM

## 2024-02-22 RX ORDER — PANTOPRAZOLE SODIUM 40 MG/1
40 TABLET, DELAYED RELEASE ORAL
Qty: 30 TABLET | Refills: 1 | Status: SHIPPED | OUTPATIENT
Start: 2024-02-22

## 2024-02-22 NOTE — PROGRESS NOTES
Virtual Video Check-In     This visit is conducted using Telemedicine with live, interactive video and audio.    Mustapha Meyers, who has verified his/her identification by name and , verbally consents to a Virtual/Telephone Check-In visit on 24.  Patient confirms that he/she is in the [facility] at the time of service.  Patient understands and accepts financial responsibility for any deductible, co-insurance and/or co-pays associated with this service.    TIME: 15 minutes      HPI: Pt complains of heartburn sx's. Has had for 5-6 days.  Pt has some [employer] shrimp and chicken and ever since then he has had the stomach issues.Sx's described as belching, gas, flatulence and some fatigue.  Pt has also had some mushy stools.  Pt denies any nausea or vomiting.Pt is not sure if he ate bad food or has a stomach virus. . Food can make the sx's worse at times. . Has not used OTC meds because he was not sure he could.    ROS:  GENERAL:  Denies fever, chills,weight change, fatigue  SKIN: Denies rashes, skin lesions  EYES: Denies blurred vision or double vision  HENT: Denies congestion,sore throat or ear pain.   CHEST: Denies chest pain, or palpitations  LUNGS: Denies shortness of breath,wheezing or cough  GI: see HPI  MUSCULOSKELETAL: Denies any arthralgia,myalgias or swollen joints  LYMPH:  Denies lymphadenopathy  NEURO:  Denies headaches and lightheadedness.   PSYCH: denies depression or anxiety    ALLERGIES:  Allergies   Allergen Reactions    Seasonal Runny nose       Current Outpatient Medications   Medication Sig Dispense Refill    metFORMIN  MG Oral Tablet 24 Hr Take 2 tablets (1,000 mg total) by mouth once daily. 180 tablet 0    Irbesartan 150 MG Oral Tab Take 1 tablet (150 mg total) by mouth nightly. 90 tablet 0    carvedilol 25 MG Oral Tab Take 1 tablet (25 mg total) by mouth 2 (two) times daily with meals. 180 tablet 0    amLODIPine 5 MG Oral Tab Take 1 tablet (5 mg total) by mouth daily. 90  tablet 0    ergocalciferol 1.25 MG (79355 UT) Oral Cap Take 1 capsule (50,000 Units total) by mouth once a week. 12 capsule 1    Blood Glucose Monitoring Suppl (ONETOUCH VERIO IQ SYSTEM) w/Device Does not apply Kit 1 Device by Other route 2 (two) times daily. Use as directed. 1 kit 0    Glucose Blood (ONETOUCH VERIO) In Vitro Strip Check glucose level daily 100 strip 1    OneTouch Delica Lancets 33G Does not apply Misc 1 Lancet by Finger stick route daily. 100 each 1    HYDROcodone-acetaminophen  MG Oral Tab       HYDROcodone-acetaminophen 5-325 MG Oral Tab       methocarbamol 500 MG Oral Tab       Neomycin-Polymyxin-Dexameth 0.1 % Ophthalmic Ointment neomycin 3.5 mg/g-polymyxin B 10,000 unit/g-dexameth 0.1 % eye oint      COLCHICINE 0.6 MG Oral Tab TAKE 1 TABLET BY MOUTH EVERY DAY 90 tablet 0    Saw Palmetto, Serenoa repens, (SAW PALMETTO OR) Take by mouth daily.      Omega-3 Fatty Acids (FISH OIL OR) Take by mouth daily.      TURMERIC OR Take by mouth every morning.      Meloxicam 15 MG Oral Tab Take 1 tablet (15 mg total) by mouth daily as needed.      FREESTYLE LITE TEST In Vitro Strip TEST DAILY AS DIRECTED 100 strip 0    Blood Glucose Monitoring Suppl (FREESTYLE LITE) Does not apply Device   0    Multiple Vitamin (MULTIVITAMINS) Oral Tab Take 1 tablet by mouth daily.         Past Medical History:   Diagnosis Date    Back problem     Biliary calculus 1985    CKD (chronic kidney disease)     Diabetes (HCC)     Diabetes mellitus (HCC)     Essential hypertension 1992    Gout     High blood pressure     History of gallstones     History of morbid obesity     Lumbar stenosis with neurogenic claudication     Osteoarthritis     Renal disorder       Past Surgical History:   Procedure Laterality Date    EXCIS LUMBAR DISK,ONE LEVEL      HIP REPLACEMENT SURGERY  2014    both    OTHER SURGICAL HISTORY  2014    bilateral hip replacement    TOTAL HIP REPLACEMENT        Social History     Socioeconomic History     Marital status: Single   Tobacco Use    Smoking status: Never    Smokeless tobacco: Never   Vaping Use    Vaping Use: Never used   Substance and Sexual Activity    Alcohol use: No    Drug use: No   Other Topics Concern    Caffeine Concern Yes     Comment: 1 cup of coffee daily and 1-2 cans of Pepsi daily.     Exercise Yes     Comment: 1-2 days/week    Pt has a pacemaker No    Reaction to local anesthetic No   Social History Narrative    Live alone    UA ramp reza    6 grandchildren        No tob - LLNS    No etoh        No pets       Family History   Adopted: Yes   Problem Relation Age of Onset    Heart Attack Father     Heart Attack Mother         PE:  No vital signs were taken for this video visit.  GENERAL: well developed, well nourished, in no acute distress  SKIN: no rashes on visible parts of skin  HEENT: normocephalic, atraumatic, conjunctiva clear  LUNGS: regular breathing rate and effort with no audible respiratory distress  NEURO: A&Ox3  PSYCH: pleasant mood and affect, appropriate judgement and insight    ASSESSMENT/PLAN:    Encounter Diagnoses   Name Primary?    Stomach flu Yes    Flatulence/gas pain/belching        No orders of the defined types were placed in this encounter.      Meds & Refills for this Visit:  Requested Prescriptions     Signed Prescriptions Disp Refills    pantoprazole (PROTONIX) 40 MG Oral Tab EC 30 tablet 1     Sig: Take 1 tablet (40 mg total) by mouth every morning before breakfast.       Imaging & Consults:  None    Start Protonix   Rest.  Drink lots of fluids.  Avoid dairy products   Avoid roughage like fresh crunchy fruits and vegetables until you start having normal stools.  Start with bland foods like bananas, rice,applesauce, toast, crackers.(BRAT DIET)  Advance your diet as tolerated.   Go to the ER immediately if you develop abdominal pain, vomiting or a fever.    The patient indicates understanding of these issues and agrees to the plan.  The patient is asked to call if  not improving.    *Please note that the following visit was completed using two-way, real-time interactive audio and/or video communication.  This has been done in good sonido to provide continuity of care in the best interest of the provider-patient relationship, due to the ongoing public health crisis/national emergency and because of restrictions of visitation.  There are limitations of this visit as physical exam could not be fully performed.  Every conscious effort was taken to allow for sufficient and adequate time.  Included in this visit, time may have been spent reviewing labs, medications, radiology tests and decision-making.  Appropriate medical decision-making and tests are ordered as detailed in the plan of care above.  Coding/billing information is submitted for this visit based on complexity of care and/or time spent for the visit.      Start Time: 11:35 am  End Time: 11:50 am    Lynne CHATTERJEE

## 2024-02-29 DIAGNOSIS — I10 ESSENTIAL HYPERTENSION: ICD-10-CM

## 2024-02-29 RX ORDER — CARVEDILOL 25 MG/1
25 TABLET ORAL 2 TIMES DAILY WITH MEALS
Qty: 180 TABLET | Refills: 0 | Status: SHIPPED | OUTPATIENT
Start: 2024-02-29

## 2024-02-29 RX ORDER — AMLODIPINE BESYLATE 5 MG/1
5 TABLET ORAL DAILY
Qty: 90 TABLET | Refills: 0 | Status: SHIPPED | OUTPATIENT
Start: 2024-02-29

## 2024-02-29 NOTE — TELEPHONE ENCOUNTER
Amlodipine 5 mg  Hypertension Medications Protocol [id number]/29/2024 12:20 AM   Protocol Details Last BP reading less than 140/90    EGFRCR or GFRAA > 50   Filled 12-3-23  Qty 90  0 refills  No future appointments.  LOV 10-10-23

## 2024-02-29 NOTE — TELEPHONE ENCOUNTER
Carvedilol 25 mg    Hypertension Medications Protocol [id number]/29/2024 12:20 AM   Protocol Details Last BP reading less than 140/90    EGFRCR or GFRAA > 50      Filled 12-3-23  Qty 180  0 refills  No future appointments.  LOV 10-10-23

## 2024-03-05 ENCOUNTER — OFFICE VISIT (OUTPATIENT)
Dept: INTERNAL MEDICINE CLINIC | Facility: CLINIC | Age: 69
End: 2024-03-05
Payer: COMMERCIAL

## 2024-03-05 VITALS
WEIGHT: 301.81 LBS | SYSTOLIC BLOOD PRESSURE: 138 MMHG | DIASTOLIC BLOOD PRESSURE: 84 MMHG | OXYGEN SATURATION: 96 % | HEART RATE: 66 BPM | HEIGHT: 67 IN | BODY MASS INDEX: 47.37 KG/M2 | RESPIRATION RATE: 20 BRPM

## 2024-03-05 DIAGNOSIS — K21.9 GASTROESOPHAGEAL REFLUX DISEASE WITHOUT ESOPHAGITIS: Primary | ICD-10-CM

## 2024-03-05 DIAGNOSIS — S76.912A MUSCLE STRAIN OF LEFT THIGH, INITIAL ENCOUNTER: ICD-10-CM

## 2024-03-05 PROCEDURE — 99213 OFFICE O/P EST LOW 20 MIN: CPT | Performed by: FAMILY MEDICINE

## 2024-03-05 NOTE — PROGRESS NOTES
CHIEF COMPLAINT:     Chief Complaint   Patient presents with    Back Pain     States has got a bit better.     Medication Follow-Up     Wondering if probiotic is okay to use.     Pain     L thigh area, pain that itches and comes and goes. Feels like throbbing. Denies numbess.        HPI:   Mustapha Meyers is a 68 year old male .  Pt is here to follow up on his GERD condition. The medication Protonix has been controlling his symptoms. Pt states it took a good week to get her symptoms under control.  Pt has been avoiding fluids and food two to three hours before bed. Pt has been avoiding inciting foods. Pt also had right shoulder pain when the GERD started but it went away also. Pt was not sure if it was related to the GERD or his gallbladder. But the pain has gone away. Pt will continue to monitor himself.    Pt inquiring if he can take a probiotic to help with his loose stools the Metformin causes. I advise him yes he can. I suggested Align, florastor, floragen etc.    Pt c/o of a burning pain in his left upper thigh that occurs ar random times. It lasts less than a minute and has been going on now for several weeks.. Pt thinks it is because he sits a lot at his new job. Discussed getting up and moving every hour. Pt   will work on this and will continue to monitor th pain  Current Outpatient Medications   Medication Sig Dispense Refill    NON FORMULARY Take 3 tablets by mouth daily. Blood flow 7      NON FORMULARY Take 1 each by mouth daily. [place] blast      carvedilol 25 MG Oral Tab Take 1 tablet (25 mg total) by mouth 2 (two) times daily with meals. 180 tablet 0    amLODIPine 5 MG Oral Tab Take 1 tablet (5 mg total) by mouth daily. (Patient taking differently: Take 1 tablet (5 mg total) by mouth at bedtime.) 90 tablet 0    pantoprazole (PROTONIX) 40 MG Oral Tab EC Take 1 tablet (40 mg total) by mouth every morning before breakfast. 30 tablet 1    Irbesartan 150 MG Oral Tab Take 1 tablet (150 mg total) by mouth  nightly. 90 tablet 0    ergocalciferol 1.25 MG (81087 UT) Oral Cap Take 1 capsule (50,000 Units total) by mouth once a week. 12 capsule 1    Blood Glucose Monitoring Suppl (ONETOUCH VERIO IQ SYSTEM) w/Device Does not apply Kit 1 Device by Other route 2 (two) times daily. Use as directed. 1 kit 0    Glucose Blood (ONETOUCH VERIO) In Vitro Strip Check glucose level daily 100 strip 1    OneTouch Delica Lancets 33G Does not apply Misc 1 Lancet by Finger stick route daily. 100 each 1    HYDROcodone-acetaminophen  MG Oral Tab       COLCHICINE 0.6 MG Oral Tab TAKE 1 TABLET BY MOUTH EVERY DAY 90 tablet 0    Omega-3 Fatty Acids (FISH OIL OR) Take by mouth daily.      Meloxicam 15 MG Oral Tab Take 1 tablet (15 mg total) by mouth daily as needed.      FREESTYLE LITE TEST In Vitro Strip TEST DAILY AS DIRECTED 100 strip 0    Blood Glucose Monitoring Suppl (FREESTYLE LITE) Does not apply Device   0    Multiple Vitamin (MULTIVITAMINS) Oral Tab Take 1 tablet by mouth daily.      metFORMIN  MG Oral Tablet 24 Hr Take 2 tablets (1,000 mg total) by mouth once daily. (Patient not taking: Reported on 3/5/2024) 180 tablet 0    HYDROcodone-acetaminophen 5-325 MG Oral Tab  (Patient not taking: Reported on 3/5/2024)      Saw Palmetto, Serenoa repens, (SAW PALMETTO OR) Take by mouth daily. (Patient not taking: Reported on 3/5/2024)      TURMERIC OR Take by mouth every morning. (Patient not taking: Reported on 3/5/2024)        Past Medical History:   Diagnosis Date    Back problem     Biliary calculus 1985    CKD (chronic kidney disease)     Diabetes (HCC)     Diabetes mellitus (HCC)     Essential hypertension 1992    Gout     High blood pressure     History of gallstones     History of morbid obesity     Lumbar stenosis with neurogenic claudication     Osteoarthritis     Renal disorder       Social History:  Social History     Socioeconomic History    Marital status: Single   Tobacco Use    Smoking status: Never    Smokeless  tobacco: Never   Vaping Use    Vaping Use: Never used   Substance and Sexual Activity    Alcohol use: No    Drug use: No   Other Topics Concern    Caffeine Concern Yes     Comment: 1 cup of coffee daily and 1-2 cans of Pepsi daily.     Exercise Yes     Comment: 1-2 days/week    Pt has a pacemaker No    Reaction to local anesthetic No   Social History Narrative    Live alone    [employer]    6 grandchildren        No tob - LLNS    No etoh        No pets        REVIEW OF SYSTEMS:   GENERAL: Denies fever, chills,weight change, decreased appetite  SKIN: Denies rashes, skin wounds or ulcers.  EYES: Denies blurred vision or double vision  HENT: Denies congestion, rhinorrhea, sore throat or ear pain  CHEST: Denies chest pain, or palpitations  LUNGS: Denies shortness of breath, cough, or wheezing  GI: Denies abdominal pain, N/V/C/D.   MUSCULOSKELETAL: no arthralgia or swollen joints  LYMPH:  Denies lymphadenopathy  NEURO: Denies headaches or lightheadedness      EXAM:   /84 (BP Location: Left arm, Patient Position: Sitting, Cuff Size: large)   Pulse 66   Resp 20   Ht 5' 7\" (1.702 m)   Wt (!) 301 lb 12.8 oz (136.9 kg)   SpO2 96%   BMI 47.27 kg/m²   GENERAL: well developed, well nourished,in no apparent distress  HEAD: atraumatic, normocephalic  EYES: conjunctiva clear  NECK: supple, non-tender  LUNGS: clear to auscultation bilaterally, no wheezes or rhonchi. Breathing is non labored.  CARDIO: RRR without murmur  GI: No visible scars, or masses. BS's present x4. No palpable masses or hepatosplenomegaly.  no tenderness on palpation in all quads  EXTREMITIES: no cyanosis, clubbing or edema  LEG/Left: no swelling, no pain, FROM. Gait steady.    Physical Exam    ASSESSMENT AND PLAN:     Encounter Diagnoses   Name Primary?    Gastroesophageal reflux disease without esophagitis Yes    Muscle strain of left thigh, initial encounter        No orders of the defined types were placed in this encounter.      Meds & Refills  for this Visit:  Requested Prescriptions      No prescriptions requested or ordered in this encounter       Imaging & Consults:  None    PLAN:  1. Gastroesophageal reflux disease without esophagitis  Symptoms continue to be well controlled on medication. Will refill medication. Pt needs to continue to work on losing weight to improve symptoms. Also patient should avoid eating or drinking two to three hours before bed. Reviewed dietary modifications with avoidance of food triggers: citrus, peppermint, chocolate, spicy foods, fatty foods, and tea/coffee. Advise NSAID avoidance.     2. Muscle strain of left thigh, initial encounter  Rest, heat/ice, tylenol or ibuprofen is needed.  Pt to walk and stretch every couple of hours at his work.    The patient indicates understanding of these issues and agrees to the plan.  The patient is asked to return if still having issues.    I spent 20 minutes preparing to see the patient,reviewing patient's chart,results,history,medical decision making,placing orders,counseling/coordinating care and documenting in the chart.

## 2024-03-18 RX ORDER — PANTOPRAZOLE SODIUM 40 MG/1
40 TABLET, DELAYED RELEASE ORAL
Qty: 90 TABLET | Refills: 1 | Status: SHIPPED | OUTPATIENT
Start: 2024-03-18

## 2024-03-18 NOTE — TELEPHONE ENCOUNTER
Pantoprazole 40 mg  Filled 2-22-24  Qty 30  1 refill  No future appointments.  LOV 03/5/24    Quality 431: Preventive Care And Screening: Unhealthy Alcohol Use - Screening: Patient not identified as an unhealthy alcohol user when screened for unhealthy alcohol use using a systematic screening method Quality 226: Preventive Care And Screening: Tobacco Use: Screening And Cessation Intervention: Patient screened for tobacco use and is an ex/non-smoker Quality 130: Documentation Of Current Medications In The Medical Record: Current Medications Documented Quality 110: Preventive Care And Screening: Influenza Immunization: Influenza Immunization Administered during Influenza season Detail Level: Detailed

## 2024-04-08 ENCOUNTER — OFFICE VISIT (OUTPATIENT)
Dept: INTERNAL MEDICINE CLINIC | Facility: CLINIC | Age: 69
End: 2024-04-08
Payer: COMMERCIAL

## 2024-04-08 VITALS
RESPIRATION RATE: 18 BRPM | DIASTOLIC BLOOD PRESSURE: 82 MMHG | TEMPERATURE: 98 F | HEIGHT: 67 IN | SYSTOLIC BLOOD PRESSURE: 144 MMHG | WEIGHT: 301.63 LBS | BODY MASS INDEX: 47.34 KG/M2 | OXYGEN SATURATION: 96 % | HEART RATE: 76 BPM

## 2024-04-08 DIAGNOSIS — E11.9 DIABETES MELLITUS WITHOUT COMPLICATION (HCC): ICD-10-CM

## 2024-04-08 DIAGNOSIS — E55.9 VITAMIN D DEFICIENCY: ICD-10-CM

## 2024-04-08 DIAGNOSIS — G89.29 CHRONIC PAIN OF LEFT KNEE: ICD-10-CM

## 2024-04-08 DIAGNOSIS — R25.2 MUSCLE CRAMPS: ICD-10-CM

## 2024-04-08 DIAGNOSIS — M25.562 CHRONIC PAIN OF LEFT KNEE: ICD-10-CM

## 2024-04-08 DIAGNOSIS — I10 ESSENTIAL HYPERTENSION: Primary | ICD-10-CM

## 2024-04-08 PROCEDURE — 99214 OFFICE O/P EST MOD 30 MIN: CPT | Performed by: FAMILY MEDICINE

## 2024-04-08 RX ORDER — ERGOCALCIFEROL 1.25 MG/1
50000 CAPSULE ORAL WEEKLY
Qty: 12 CAPSULE | Refills: 0 | Status: SHIPPED | OUTPATIENT
Start: 2024-04-08

## 2024-04-08 RX ORDER — MULTIVIT WITH MINERALS/LUTEIN
1000 TABLET ORAL DAILY
COMMUNITY

## 2024-04-08 RX ORDER — AMLODIPINE BESYLATE 5 MG/1
5 TABLET ORAL NIGHTLY
Qty: 90 TABLET | Refills: 0 | Status: SHIPPED | OUTPATIENT
Start: 2024-04-08

## 2024-04-08 RX ORDER — MULTIVIT-MIN/IRON FUM/FOLIC AC 7.5 MG-4
1 TABLET ORAL DAILY
COMMUNITY

## 2024-04-08 RX ORDER — IRBESARTAN 150 MG/1
150 TABLET ORAL NIGHTLY
Qty: 90 TABLET | Refills: 0 | Status: SHIPPED | OUTPATIENT
Start: 2024-04-08

## 2024-04-08 RX ORDER — GARLIC EXTRACT 500 MG
1 CAPSULE ORAL DAILY
COMMUNITY

## 2024-04-08 NOTE — PROGRESS NOTES
CHIEF COMPLAINT:     Chief Complaint   Patient presents with    Musculoskeletal Problem     R leg tingling, no pain.        HPI:   Mustapha Meyers is a 69 year old male .  Pt c/o muscle cramps in the right lower calf on and off for about 3-4 weeks. Pt states they occur a lot in the morning while he is in bed and is trying to stretch. Pt has not had muscles cramps in the past few days. Pt has been trying to drink more water. Dicussed checking his potassium level. Pt states he is due for labs in a few weeks any way  Pt also gets a stabbing pain in his left thigh on and off. Pt feels that is more from his left knee pain and they way he walks. If the knee pain continues Pt will follow up with his Ortho.    Patient presents for recheck of their hypertension. Pt has been taking medications as instructed but did not take it this morning because he was running late for his appt., no medication side effects, home BP monitoring occ done at home. Pt feels his BP cuff at home reads high so is going to get a new one.  Currently asymptomatic, no chest pains, jaw pains, arm pains. No headaches, dizziness or edema.  No SOB on exertion or rest.  Pt has been following a low fat diet and has not been exercising.    Mustapha Meyers is a 68 year old male who presents for recheck of his diabetes.    Pt tests his blood 1 times daily.  Last Hgba1c: 11/3/23  6.9  Urine Microalbumin:11/3/23 elevated  Last visit with ophthalmologist was - 11/3/23    Pt has been checking his feet on a regular basis. Pt denies any tingling of the feet or legs.       BP Readings from Last 3 Encounters:   04/08/24 144/82   03/05/24 138/84   12/26/23 (!) 155/96       Current Outpatient Medications   Medication Sig Dispense Refill    acidophilus-pectin Oral Cap Take 1 capsule by mouth daily.      Ascorbic Acid (VITAMIN C) 1000 MG Oral Tab Take 1 tablet (1,000 mg total) by mouth daily.      Multiple Vitamins-Minerals (MULTI-VITAMIN/MINERALS) Oral Tab Take 1  tablet by mouth daily. Milk thistle and celery seed      amLODIPine 5 MG Oral Tab Take 1 tablet (5 mg total) by mouth at bedtime. 90 tablet 0    ergocalciferol 1.25 MG (52408 UT) Oral Cap Take 1 capsule (50,000 Units total) by mouth once a week. 12 capsule 0    Irbesartan 150 MG Oral Tab Take 1 tablet (150 mg total) by mouth nightly. 90 tablet 0    pantoprazole (PROTONIX) 40 MG Oral Tab EC Take 1 tablet (40 mg total) by mouth every morning before breakfast. 90 tablet 1    NON FORMULARY Take 3 tablets by mouth daily. Blood flow 7      NON FORMULARY Take 1 each by mouth daily. Ginger blast      carvedilol 25 MG Oral Tab Take 1 tablet (25 mg total) by mouth 2 (two) times daily with meals. 180 tablet 0    metFORMIN  MG Oral Tablet 24 Hr Take 2 tablets (1,000 mg total) by mouth once daily. 180 tablet 0    Blood Glucose Monitoring Suppl (ONETOUCH VERIO IQ SYSTEM) w/Device Does not apply Kit 1 Device by Other route 2 (two) times daily. Use as directed. 1 kit 0    Glucose Blood (ONETOUCH VERIO) In Vitro Strip Check glucose level daily 100 strip 1    OneTouch Delica Lancets 33G Does not apply Misc 1 Lancet by Finger stick route daily. 100 each 1    HYDROcodone-acetaminophen  MG Oral Tab       HYDROcodone-acetaminophen 5-325 MG Oral Tab       COLCHICINE 0.6 MG Oral Tab TAKE 1 TABLET BY MOUTH EVERY DAY 90 tablet 0    Saw Palmetto, Serenoa repens, (SAW PALMETTO OR) Take by mouth daily.      Omega-3 Fatty Acids (FISH OIL OR) Take by mouth daily.      TURMERIC OR Take by mouth every morning.      Meloxicam 15 MG Oral Tab Take 1 tablet (15 mg total) by mouth daily as needed.      FREESTYLE LITE TEST In Vitro Strip TEST DAILY AS DIRECTED 100 strip 0    Blood Glucose Monitoring Suppl (FREESTYLE LITE) Does not apply Device   0    Multiple Vitamin (MULTIVITAMINS) Oral Tab Take 1 tablet by mouth daily.        Past Medical History:   Diagnosis Date    Back problem     Biliary calculus 1985    CKD (chronic kidney disease)      Diabetes (HCC)     Diabetes mellitus (HCC)     Essential hypertension 1992    Gout     High blood pressure     History of gallstones     History of morbid obesity     Lumbar stenosis with neurogenic claudication     Osteoarthritis     Renal disorder       Social History:  Social History     Socioeconomic History    Marital status: Single   Tobacco Use    Smoking status: Never    Smokeless tobacco: Never   Vaping Use    Vaping Use: Never used   Substance and Sexual Activity    Alcohol use: No    Drug use: No   Other Topics Concern    Caffeine Concern Yes     Comment: 1 cup of coffee daily and 1-2 cans of Pepsi daily.     Exercise Yes     Comment: 1-2 days/week    Pt has a pacemaker No    Reaction to local anesthetic No   Social History Narrative    Live alone    [employer]    6 grandchildren        No tob - LLNS    No etoh        No pets        REVIEW OF SYSTEMS:   GENERAL: Denies fever, chills,weight change, decreased appetite  SKIN: Denies rashes, skin wounds or ulcers.  EYES: Denies blurred vision or double vision  HENT: Denies congestion, rhinorrhea, sore throat or ear pain  CHEST: Denies chest pain, or palpitations  LUNGS: Denies shortness of breath, cough, or wheezing  GI: Denies abdominal pain, N/V/C/D.   MUSCULOSKELETAL: see HPI  LYMPH:  Denies lymphadenopathy  NEURO: Denies headaches or lightheadedness      EXAM:   /82 (BP Location: Right arm, Patient Position: Sitting, Cuff Size: large)   Pulse 76   Temp 98.1 °F (36.7 °C) (Temporal)   Resp 18   Ht 5' 7\" (1.702 m)   Wt (!) 301 lb 9.6 oz (136.8 kg)   SpO2 96%   BMI 47.24 kg/m²   GENERAL: well developed, well nourished,in no apparent distress  SKIN: no rashes,no suspicious lesions  HEAD: atraumatic, normocephalic  EYES: conjunctiva clear  NECK: supple, non-tender  LUNGS: clear to auscultation bilaterally, no wheezes or rhonchi. Breathing is non labored.  CARDIO: RRR without murmur  EXTREMITIES: no cyanosis, clubbing or edema  CALF/Right: soft, no  pain, no redness. Gait steady  KNEE/Left: + pain, no swelling, FROM with some pain.  Physical Exam    ASSESSMENT AND PLAN:     Encounter Diagnoses   Name Primary?    Essential hypertension Yes    Muscle cramps     Chronic pain of left knee     Diabetes mellitus without complication (HCC)     Vitamin D deficiency        Orders Placed This Encounter   Procedures    Hemoglobin A1C       Meds & Refills for this Visit:  Requested Prescriptions     Signed Prescriptions Disp Refills    amLODIPine 5 MG Oral Tab 90 tablet 0     Sig: Take 1 tablet (5 mg total) by mouth at bedtime.    ergocalciferol 1.25 MG (12749 UT) Oral Cap 12 capsule 0     Sig: Take 1 capsule (50,000 Units total) by mouth once a week.    Irbesartan 150 MG Oral Tab 90 tablet 0     Sig: Take 1 tablet (150 mg total) by mouth nightly.       Imaging & Consults:  None    PLAN:  1. Essential hypertension  Conservative measures dicussed. Continue medication.  Diet and exercise explained and encouraged.  Fasting labs due.  Home blood pressure monitoring. Pt should measure BP’s two to three times per week. Goal blood pressure at home - < 135/85.   - amLODIPine 5 MG Oral Tab; Take 1 tablet (5 mg total) by mouth at bedtime.  Dispense: 90 tablet; Refill: 0  - Irbesartan 150 MG Oral Tab; Take 1 tablet (150 mg total) by mouth nightly.  Dispense: 90 tablet; Refill: 0    2. Muscle cramps    - drink plenty of fluids  -stretch your legs before bedtime  - use heat/ice, and massage  - eat a diet high in potassium- fruit like Bananas,oranges,melon,baked potatoes,salmon  - avoid stimulants like caffeine, nicotine and decongestants    3. Chronic pain of left knee  Rest, ice application followed by heat. Elevate extremity.  NSAIDs/Tylenol for pain.  Avoid kneeling, squatting, jumping and running for now.   Avoid prolonged standing or walking.   Pt  to see his Ortho for further evaluation    4. Diabetes mellitus without complication (HCC)  Continue present medications. Check sugars  daily or as directed.  Eat a low fat, low carb, low cholesterol diet.   Please see your specialists as recommended.  Keep up with regular eye exams and Check feet daily. Stay on your low salt, diabetic diet.  Stay as active as you can.      - Hemoglobin A1C; Future  - Microalb/Creat Ratio, Random Urine; Future    5. Vitamin D deficiency  Check level.  - ergocalciferol 1.25 MG (38406 UT) Oral Cap; Take 1 capsule (50,000 Units total) by mouth once a week.  Dispense: 12 capsule; Refill: 0    The patient indicates understanding of these issues and agrees to the plan.  The patient is asked to return if not improving. Otherwise Px is due in October.    I spent 30 minutes preparing to see the patient,reviewing patient's chart,results,history,medical decision making,placing orders,counseling/coordinating care and documenting in the chart.

## 2024-05-13 ENCOUNTER — TELEPHONE (OUTPATIENT)
Dept: INTERNAL MEDICINE CLINIC | Facility: CLINIC | Age: 69
End: 2024-05-13

## 2024-05-13 DIAGNOSIS — M10.9 GOUTY ARTHROPATHY: ICD-10-CM

## 2024-05-13 RX ORDER — COLCHICINE 0.6 MG/1
0.6 TABLET ORAL DAILY
Qty: 90 TABLET | Refills: 0 | Status: SHIPPED | OUTPATIENT
Start: 2024-05-13

## 2024-05-13 NOTE — TELEPHONE ENCOUNTER
JUN Sánchez,    Patient is requesting intermittent Family Medical Leave Act due to gout. Patient is requesting 1-5 flare ups per month, each flare up lasting 1-3 days. Do you support?    Thank you,  Fadia

## 2024-05-13 NOTE — TELEPHONE ENCOUNTER
Patient called to ask how he submit his Family Medical Leave Act forms, I provided patient with forms department fax and email. Patient stated he will be submitting forms. Obtained Family Medical Leave Act details:    Type of Leave: intermittent Family Medical Leave Act    Reason for Leave: Gout   Start date of leave: 5/1/24-5/1/25  How much time needed?: 1-5 flare up per month, each flare lasting 1-3 days.   Forms Due Date:  Was Fee and Turnaround info Given?: yes  Patient would like completed forms uploaded to his [employer]

## 2024-05-14 NOTE — TELEPHONE ENCOUNTER
Family Medical Leave Act form received in forms department and logged for processing. No Release of Information - patient sent [employer] message.

## 2024-05-17 NOTE — TELEPHONE ENCOUNTER
Lynne Sánchez,     *The ACKNOWLEDGE button has been moved to the top right ribbon*    Please sign off on form if you agree to: Family Medical Leave Act/Gout - 1-5 flare ups/month, each lasting 1-3 days. (Start 5/1/24 - end 5/1/25)  (place your signature on the first page only)    -From your Inbasket, Highlight the patient and click Chart   -Double click the 5/13/24  Forms Completion telephone encounter  -Scroll down to the Media section   -Click the blue Hyperlink: Family Medical Leave Act  Lynne Sánchez  5/17/24  -Click Acknowledge located in the top right ribbon/menu   -Drag the mouse into the blank space of the document and a + sign will appear. Left click to   electronically sign the document.     Thank you,  Paris HOLMAN

## 2024-05-19 DIAGNOSIS — E11.9 DIABETES MELLITUS WITHOUT COMPLICATION (HCC): ICD-10-CM

## 2024-05-19 RX ORDER — METFORMIN HYDROCHLORIDE 500 MG/1
1000 TABLET, EXTENDED RELEASE ORAL
Qty: 180 TABLET | Refills: 0 | Status: SHIPPED | OUTPATIENT
Start: 2024-05-19

## 2024-05-20 NOTE — TELEPHONE ENCOUNTER
Forms completed and signed.Per patient;'s request upload to mychart. Electronically uploaded to patient's mychart.

## 2024-07-08 ENCOUNTER — OFFICE VISIT (OUTPATIENT)
Dept: INTERNAL MEDICINE CLINIC | Facility: CLINIC | Age: 69
End: 2024-07-08
Payer: COMMERCIAL

## 2024-07-08 VITALS
OXYGEN SATURATION: 98 % | SYSTOLIC BLOOD PRESSURE: 126 MMHG | RESPIRATION RATE: 20 BRPM | BODY MASS INDEX: 46.77 KG/M2 | HEIGHT: 67 IN | DIASTOLIC BLOOD PRESSURE: 72 MMHG | HEART RATE: 74 BPM | WEIGHT: 298 LBS

## 2024-07-08 DIAGNOSIS — M54.50 ACUTE RIGHT-SIDED LOW BACK PAIN WITHOUT SCIATICA: Primary | ICD-10-CM

## 2024-07-08 PROCEDURE — 99213 OFFICE O/P EST LOW 20 MIN: CPT | Performed by: FAMILY MEDICINE

## 2024-07-08 RX ORDER — CYCLOBENZAPRINE HCL 10 MG
10 TABLET ORAL 3 TIMES DAILY PRN
Qty: 30 TABLET | Refills: 0 | Status: SHIPPED | OUTPATIENT
Start: 2024-07-08 | End: 2024-07-28

## 2024-07-08 RX ORDER — METHYLPREDNISOLONE 4 MG/1
TABLET ORAL
Qty: 1 EACH | Refills: 0 | Status: SHIPPED | OUTPATIENT
Start: 2024-07-08

## 2024-07-08 RX ORDER — HYDROCODONE BITARTRATE AND ACETAMINOPHEN 5; 325 MG/1; MG/1
1 TABLET ORAL EVERY 6 HOURS PRN
Qty: 30 TABLET | Refills: 0 | Status: SHIPPED | OUTPATIENT
Start: 2024-07-08

## 2024-07-08 NOTE — PROGRESS NOTES
CHIEF COMPLAINT:     Chief Complaint   Patient presents with    Low Back Pain     Started Sunday, Muscle spasms badly. States started the whole back and lower back. OTC ibuprofen        HPI:   Mustapha Meyers is a 69 year old male who is here for complaints of lower back pain.  Pain is located at right low back, low back. Pain is described as  squeezing  . Severity is moderate. The pain radiates to no radiation of pain. Pain was precipitated by unknown, Pt fell asleep in his office chair, woke up went to bed, and then when he woke up on Sunday with a bad muscle spasm to the point it was hard to move. Pt has been taking some Ibuprofen which takes the edge off . Has had for 2  days. Pain is worsened by bending, twisting, lifting. Gets relief of pain with heat, ice, ibuprofen  . Prior  pain hx: recurrent self limited episodes of low back pain in the past and previous osteoarthritis of lumbar spine and spine surgery.   Denies emily loss of bowel or bladder control.  v  Current Outpatient Medications   Medication Sig Dispense Refill    methylPREDNISolone (MEDROL) 4 MG Oral Tablet Therapy Pack As directed. 1 each 0    cyclobenzaprine 10 MG Oral Tab Take 1 tablet (10 mg total) by mouth 3 (three) times daily as needed. 30 tablet 0    HYDROcodone-acetaminophen (NORCO) 5-325 MG Oral Tab Take 1 tablet by mouth every 6 (six) hours as needed for Pain. 30 tablet 0    colchicine 0.6 MG Oral Tab Take 1 tablet (0.6 mg total) by mouth daily. 90 tablet 0    Ascorbic Acid (VITAMIN C) 1000 MG Oral Tab Take 1 tablet (1,000 mg total) by mouth daily.      Multiple Vitamins-Minerals (MULTI-VITAMIN/MINERALS) Oral Tab Take 1 tablet by mouth daily. Milk thistle and celery seed      amLODIPine 5 MG Oral Tab Take 1 tablet (5 mg total) by mouth at bedtime. 90 tablet 0    Irbesartan 150 MG Oral Tab Take 1 tablet (150 mg total) by mouth nightly. 90 tablet 0    carvedilol 25 MG Oral Tab Take 1 tablet (25 mg total) by mouth 2 (two) times daily with  meals. 180 tablet 0    Blood Glucose Monitoring Suppl (ONETOUCH VERIO IQ SYSTEM) w/Device Does not apply Kit 1 Device by Other route 2 (two) times daily. Use as directed. 1 kit 0    Glucose Blood (ONETOUCH VERIO) In Vitro Strip Check glucose level daily 100 strip 1    OneTouch Delica Lancets 33G Does not apply Misc 1 Lancet by Finger stick route daily. 100 each 1    Saw Palmetto, Serenoa repens, (SAW PALMETTO OR) Take by mouth daily.      Omega-3 Fatty Acids (FISH OIL OR) Take by mouth daily.      TURMERIC OR Take by mouth every morning.      Blood Glucose Monitoring Suppl (FREESTYLE LITE) Does not apply Device   0    Multiple Vitamin (MULTIVITAMINS) Oral Tab Take 1 tablet by mouth daily.      METFORMIN  MG Oral Tablet 24 Hr TAKE 2 TABLETS (1,000 MG TOTAL) BY MOUTH ONCE DAILY. (Patient not taking: Reported on 7/8/2024) 180 tablet 0    acidophilus-pectin Oral Cap Take 1 capsule by mouth daily. (Patient not taking: Reported on 7/8/2024)      ergocalciferol 1.25 MG (37576 UT) Oral Cap Take 1 capsule (50,000 Units total) by mouth once a week. (Patient not taking: Reported on 7/8/2024) 12 capsule 0    pantoprazole (PROTONIX) 40 MG Oral Tab EC Take 1 tablet (40 mg total) by mouth every morning before breakfast. (Patient not taking: Reported on 7/8/2024) 90 tablet 1    NON FORMULARY Take 3 tablets by mouth daily. Blood flow 7 (Patient not taking: Reported on 7/8/2024)      NON FORMULARY Take 1 each by mouth daily. Ginger blast (Patient not taking: Reported on 7/8/2024)      HYDROcodone-acetaminophen  MG Oral Tab  (Patient not taking: Reported on 7/8/2024)      HYDROcodone-acetaminophen 5-325 MG Oral Tab  (Patient not taking: Reported on 7/8/2024)      Meloxicam 15 MG Oral Tab Take 1 tablet (15 mg total) by mouth daily as needed. (Patient not taking: Reported on 7/8/2024)      FREESTYLE LITE TEST In Vitro Strip TEST DAILY AS DIRECTED (Patient not taking: Reported on 7/8/2024) 100 strip 0      Past Medical  History:    Back problem    Biliary calculus    CKD (chronic kidney disease)    Diabetes (HCC)    Diabetes mellitus (HCC)    Essential hypertension    Gout    High blood pressure    History of gallstones    History of morbid obesity    Lumbar stenosis with neurogenic claudication    Osteoarthritis    Renal disorder      Social History:  Social History     Socioeconomic History    Marital status: Single   Tobacco Use    Smoking status: Never    Smokeless tobacco: Never   Vaping Use    Vaping status: Never Used   Substance and Sexual Activity    Alcohol use: No    Drug use: No   Other Topics Concern    Caffeine Concern Yes     Comment: 1 cup of coffee daily and 1-2 cans of Pepsi daily.     Exercise Yes     Comment: 1-2 days/week    Pt has a pacemaker No    Reaction to local anesthetic No   Social History Narrative    Live alone    UA ramp reza    6 grandchildren        No tob - LLNS    No etoh        No pets     Social Determinants of Health      [facility]    Housing Stability        REVIEW OF SYSTEMS:   GENERAL: feels well otherwise  SKIN: denies any unusual skin lesions  LUNGS: denies shortness of breath   CARDIOVASCULAR: denies chest pain or palpitations  GI: denies abdominal pain, N/VC/D.  Denies heartburn  : no dysuria, urgency or flank pain.  MUSCULOSKELETAL: Per HPI.  No other joints are affected  NEURO: No numbness or tingling.  No loss of bowel or bladder control.    EXAM:   /72 (BP Location: Left arm, Patient Position: Sitting, Cuff Size: large)   Pulse 74   Resp 20   Ht 5' 7\" (1.702 m)   Wt 298 lb (135.2 kg)   SpO2 98%   BMI 46.67 kg/m²    GENERAL: well developed, well nourished,in no apparent distress  SKIN: no rashes,no suspicious lesions  NECK: supple,no adenopathy,no bruits  LUNGS: clear to auscultation  CARDIO: RRR without murmur  GI: normoactive bs x4, no masses, HSM or tenderness  EXTREMITIES: no cyanosis, clubbing or  edema  BACK: lower back tense and tight. lumbosacral tenderness, DTR's are 2+ bilaterally, strength is 5+/5, sensation is intact  NEURO: Alert & Oriented x 3.  CN II-XII intact.Gait slow and steady with a walker. Muscle tone and strength normal and symmetric. Motor and sensation grossly normal.      ASSESSMENT:   Mustapha Meyers is a 69 year old male who presents with Findings are consistent with Lumbar Musculoskeletal Strain, Lumbar Muscle Spasm.      PLAN:   Comfort care as listed in patient instructions as well as rest, ice, heat, medication as ordered below, no lifting, pushing or pulling,  stretches. Pt to call his back specialist if the back pain is not improving.  Medication as below.  Requested Prescriptions     Signed Prescriptions Disp Refills    methylPREDNISolone (MEDROL) 4 MG Oral Tablet Therapy Pack 1 each 0     Sig: As directed.    cyclobenzaprine 10 MG Oral Tab 30 tablet 0     Sig: Take 1 tablet (10 mg total) by mouth 3 (three) times daily as needed.    HYDROcodone-acetaminophen (NORCO) 5-325 MG Oral Tab 30 tablet 0     Sig: Take 1 tablet by mouth every 6 (six) hours as needed for Pain.       The patient indicates understanding of these issues and agrees to the plan.  The patient is asked to call  if sx's persist or worsen.

## 2024-07-09 ENCOUNTER — PATIENT MESSAGE (OUTPATIENT)
Dept: INTERNAL MEDICINE CLINIC | Facility: CLINIC | Age: 69
End: 2024-07-09

## 2024-07-09 DIAGNOSIS — Z00.00 LABORATORY EXAMINATION ORDERED AS PART OF A ROUTINE GENERAL MEDICAL EXAMINATION: Primary | ICD-10-CM

## 2024-07-09 NOTE — TELEPHONE ENCOUNTER
Spoke with patient to discuss symptoms. Patient is not experiencing any urinary symptoms. Denies burning with urination, fevers, blood in urine, body aches, abdominal pain. Patient does get chills, but did mention he has a fan and the air conditioner on. He does state he has back pain, mainly on the right which radiates to the left with movement. His whole lower back hurts when he's moving around.     This RN gave patient recommendations per SHENA Jenkins. Patient verbalized understanding and will get a urinalysis and urine culture in the morning. Ordered urinalysis and urine culture per written order.     SM: No he's not having any urinary symptoms or abdominal pain. Pt will get u/a and urine culture completed tomorrow.

## 2024-07-09 NOTE — TELEPHONE ENCOUNTER
Please triage - any urinary symptoms? Abdominal pain?  Can have Pt go to the lab to check a u/a and urine culture if he would like.

## 2024-07-09 NOTE — TELEPHONE ENCOUNTER
From: Mustapha Meyers  To: Lynnejesus Sánchez  Sent: 7/9/2024 3:59 PM CDT  Subject: Infection    As usual , My Brain is always 30 mph behind and always after I leave an appointment, I am wondering if this could be a Kidney Infection ? I also thought about Appendicitis, but I don't have pain in my Belly Button. I know I have to give the Meds time to work, but my main issue was asking about the Infection because of the chills,     Thanks Again

## 2024-07-10 ENCOUNTER — APPOINTMENT (OUTPATIENT)
Dept: GENERAL RADIOLOGY | Age: 69
End: 2024-07-10
Attending: NURSE PRACTITIONER
Payer: COMMERCIAL

## 2024-07-10 ENCOUNTER — HOSPITAL ENCOUNTER (OUTPATIENT)
Age: 69
Discharge: HOME | End: 2024-07-10
Payer: COMMERCIAL

## 2024-07-10 VITALS
SYSTOLIC BLOOD PRESSURE: 165 MMHG | OXYGEN SATURATION: 96 % | TEMPERATURE: 97 F | DIASTOLIC BLOOD PRESSURE: 98 MMHG | RESPIRATION RATE: 18 BRPM | HEART RATE: 91 BPM

## 2024-07-10 DIAGNOSIS — M25.539 WRIST PAIN: Primary | ICD-10-CM

## 2024-07-10 DIAGNOSIS — M54.50 LOW BACK PAIN AT MULTIPLE SITES: ICD-10-CM

## 2024-07-10 DIAGNOSIS — R03.0 ELEVATED BLOOD PRESSURE READING: ICD-10-CM

## 2024-07-10 DIAGNOSIS — M25.641 JOINT STIFFNESS OF HAND, RIGHT: ICD-10-CM

## 2024-07-10 LAB
BILIRUB UR QL STRIP: NEGATIVE
CLARITY UR: CLEAR
COLOR UR: YELLOW
GLUCOSE UR STRIP-MCNC: NEGATIVE MG/DL
KETONES UR STRIP-MCNC: NEGATIVE MG/DL
LEUKOCYTE ESTERASE UR QL STRIP: NEGATIVE
NITRITE UR QL STRIP: NEGATIVE
PH UR STRIP: 6 [PH]
PROT UR STRIP-MCNC: 100 MG/DL
SP GR UR STRIP: 1.02
UROBILINOGEN UR STRIP-ACNC: <2 MG/DL

## 2024-07-10 PROCEDURE — 99213 OFFICE O/P EST LOW 20 MIN: CPT | Performed by: NURSE PRACTITIONER

## 2024-07-10 PROCEDURE — 81002 URINALYSIS NONAUTO W/O SCOPE: CPT | Performed by: NURSE PRACTITIONER

## 2024-07-10 PROCEDURE — 73110 X-RAY EXAM OF WRIST: CPT | Performed by: NURSE PRACTITIONER

## 2024-07-10 PROCEDURE — 73130 X-RAY EXAM OF HAND: CPT | Performed by: NURSE PRACTITIONER

## 2024-07-10 NOTE — DISCHARGE INSTRUCTIONS
Please keep appointment with Dr. Rodríguez tomorrow as discussed  Hydrocodone as prescribed by your primary care provider for pain  Wear wrist brace as discussed  We will contact you if your urine culture is abnormal      Your blood pressure is high today. Please take your blood pressure medication as discussed. Please check your blood pressure at home 3 times per day, and write it down. Do this for 5 days. Follow up with your doctor in 5 days, and bring the blood pressure log with you for review. For now, avoid high sodium foods. Avoid decongestants like Afrin nasal spray or Sudafed. Blood pressure over 180/100 is concerning, if you have 2 consecutive readings like this, please call your primary care provider or go directly to the emergency room. If you develop chest pain, shortness of breath, dizziness, headache or other unusual symptoms, please go to the emergency room.

## 2024-07-10 NOTE — ED INITIAL ASSESSMENT (HPI)
Pt c/o right wrist pain started 2 days ago, denies injury, pt is also requesting urinalysis due intermittent back spasm for few days

## 2024-07-10 NOTE — ED PROVIDER NOTES
Patient Seen in: [facility]      History     Chief Complaint   Patient presents with    Wrist Pain     Stated Complaint: Xray Right Hand,Urinary Problem /Testing    Subjective:   Patient is a 69-year-old male with hypertension, diabetes mellitus type 2, gout today for right hand and wrist pain ongoing for 2 days.  He denies injury or trauma.  He denies numbness or tingling the right upper extremity.  No fever or redness.  Also here for urine dip.  Reports he has had intermittent low back spasms ongoing for several days.  Saw his primary care provider 2 days ago, who started him on hydrocodone and cyclobenzaprine for the back issue.  She ordered outpatient urinalysis and urine culture however patient has not done yet.  He denies urinary symptoms.  Reports he does not have the back pain currently.  When he does, he notices it mostly with movement.  He denies flank pain.  He denies abdominal pain.        Objective:   No pertinent past medical history.            No pertinent past surgical history.              No pertinent social history.            Review of Systems    Positive for stated Chief Complaint: Wrist Pain    Other systems are as noted in HPI.  Constitutional and vital signs reviewed.      All other systems reviewed and negative except as noted above.    Physical Exam     ED Triage Vitals [07/10/24 1554]   BP (!) 185/104   Pulse 91   Resp 18   Temp 97.4 °F (36.3 °C)   Temp src Temporal   SpO2 94 %   O2 Device None (Room air)       Current Vitals:   Vital Signs  BP: (!) 165/98  Pulse: 91  Resp: 18  Temp: 97.4 °F (36.3 °C)  Temp src: Temporal    Oxygen Therapy  SpO2: 96 %  O2 Device: None (Room air)            Physical Exam  Constitutional:       Appearance: Normal appearance.   Cardiovascular:      Rate and Rhythm: Normal rate and regular rhythm.   Pulmonary:      Effort: Pulmonary effort is normal.      Breath sounds: Normal breath sounds.   Abdominal:      General: Abdomen is flat. Bowel  sounds are normal.      Palpations: Abdomen is soft.      Tenderness: There is no abdominal tenderness. There is no right CVA tenderness or left CVA tenderness.   Musculoskeletal:      Right wrist: Bony tenderness and snuff box tenderness present. No swelling. Normal range of motion. Normal pulse.      Right hand: Bony tenderness (and stuffness to 2nd and 3rd PIP. No erythema.) present. Normal range of motion. Normal strength. Normal sensation. Normal capillary refill. Normal pulse.      Thoracic back: Normal.      Lumbar back: Normal.   Neurological:      Mental Status: He is alert.         ED Course     Labs Reviewed   [facility] POCT URINALYSIS DIPSTICK - Abnormal; Notable for the following components:       Result Value    Protein urine 100 (*)     Blood, Urine Trace-Intact (*)     All other components within normal limits   URINE CULTURE, ROUTINE       MDM        Medical Decision Making  Differentials considered: Right wrist fracture versus right wrist sprain versus OA flare versus gout versus other    Based on HPI, exam, right wrist x-ray, high suspicion for scapholunate ligament tear.  Patient is currently wearing wrist brace, I advised he continue wearing this.  Discussed pain relief.  He has an appointment with his provider at [facility] tomorrow morning, which I advised he keep.  He was given films of his right wrist and right hand x-ray.  Urine dip, and urine culture sent per request. Urine dip not consistent with infection and in the absence of urinary symptoms, will await culture and treat accordingly if necessary.  He has no CVAT on exam, no flank or abdominal pain currently, low suspicion for obstructive stone or pyelonephritis. We will contact him if urine culture is abnormal. Patient verbalized understanding and agreeable to plan of care.    Discussed elevated blood pressure with patient today.  Advised to keep a log of blood pressures 3 times per day for the next 5 days, and bring to primary care  provider.  For blood pressure 180/100 or higher, patient is to go to the emergency room, especially if it is associated with headache, chest pain, shortness of breath, dizziness, or lightheadedness.  Discussed the implications of untreated elevated blood pressure.  Patient verbalized understanding      Amount and/or Complexity of Data Reviewed  Labs: ordered. Decision-making details documented in ED Course.     Details: Urine dip  Urine culture   Radiology: ordered and independent interpretation performed. Decision-making details documented in ED Course.     Details: I personally reviewed right hand x-ray: No fracture  I personally reviewed right wrist x-ray: No fracture  Discussion of management or test interpretation with external provider(s): Dr. Travis Reed         Disposition and Plan     Clinical Impression:  1. Wrist pain    2. Joint stiffness of hand, right    3. Low back pain at multiple sites    4. Elevated blood pressure reading         Disposition:  Discharge  7/10/2024  5:34 pm    Follow-up:  No follow-up provider specified.        Medications Prescribed:  Discharge Medication List as of 7/10/2024  5:34 PM

## 2024-07-11 DIAGNOSIS — I10 ESSENTIAL HYPERTENSION: ICD-10-CM

## 2024-07-11 RX ORDER — IRBESARTAN 150 MG/1
150 TABLET ORAL NIGHTLY
Qty: 90 TABLET | Refills: 0 | Status: SHIPPED | OUTPATIENT
Start: 2024-07-11

## 2024-07-15 ENCOUNTER — TELEMEDICINE (OUTPATIENT)
Dept: INTERNAL MEDICINE CLINIC | Facility: CLINIC | Age: 69
End: 2024-07-15
Payer: COMMERCIAL

## 2024-07-15 DIAGNOSIS — M10.9 GOUTY ARTHROPATHY: Primary | ICD-10-CM

## 2024-07-15 NOTE — PROGRESS NOTES
Virtual Video Check-In     This visit is conducted using Telemedicine with live, interactive video and audio.    Mustapha Meyers, who has verified his/her identification by name and , verbally consents to a Virtual/Telephone Check-In visit on 07/15/24.  Patient confirms that he/she is in the [facility] at the time of service.  Patient understands and accepts financial responsibility for any deductible, co-insurance and/or co-pays associated with this service.    TIME: 14 minutes      HPI: The patient presents for a recheck after Urgent Care visit for diagnosis of wrist pain. Was seen 5 days ago.  History: Patient is a 69-year-old male with hypertension, diabetes mellitus type 2, gout today for right hand and wrist pain ongoing for 2 days. He denies injury or trauma. He denies numbness or tingling the right upper extremity. No fever or redness. Also here for urine dip. Reports he has had intermittent low back spasms ongoing for several days. Saw his primary care provider 2 days ago, who started him on hydrocodone and cyclobenzaprine for the back issue. She ordered outpatient urinalysis and urine culture however patient has not done yet. He denies urinary symptoms. Reports he does not have the back pain currently. When he does, he notices it mostly with movement. He denies flank pain. He denies abdominal pain.    The following tests were done: right wrist and hand x-ray. Wrist xray showed a possible scapholunate ligament tear. Hand xray showed degenerative changes. Pt saw the hand specialist and he told Pt there is not tear, it's his gout acting up. Pt also had done a urine culture which was negative.Pt is on the following meds: no new medications.  The patient is not totally feeling better. Pt's right hand is still swollen and tender. Pt finished medrol dose charlene last week, and feels he needs to do another one. Pt does have one at home to take but wanted to check with me first.       ROS:  GENERAL:   Denies fever, chills,weight change, fatigue  SKIN: Denies rashes, skin lesions  EYES: Denies blurred vision or double vision  HENT: Denies congestion,sore throat or ear pain.   CHEST: Denies chest pain, or palpitations  LUNGS: Denies shortness of breath,wheezing or cough  GI: Denies abdominal pain, N/V/C/D.   MUSCULOSKELETAL: see HPI  LYMPH:  Denies lymphadenopathy  NEURO:  Denies headaches and lightheadedness.   PSYCH: denies depression or anxiety    ALLERGIES:  Allergies   Allergen Reactions    Seasonal Runny nose       Current Outpatient Medications   Medication Sig Dispense Refill    IRBESARTAN 150 MG Oral Tab TAKE 1 TABLET BY MOUTH EVERY DAY AT NIGHT 90 tablet 0    methylPREDNISolone (MEDROL) 4 MG Oral Tablet Therapy Pack As directed. 1 each 0    cyclobenzaprine 10 MG Oral Tab Take 1 tablet (10 mg total) by mouth 3 (three) times daily as needed. 30 tablet 0    HYDROcodone-acetaminophen (NORCO) 5-325 MG Oral Tab Take 1 tablet by mouth every 6 (six) hours as needed for Pain. 30 tablet 0    METFORMIN  MG Oral Tablet 24 Hr TAKE 2 TABLETS (1,000 MG TOTAL) BY MOUTH ONCE DAILY. (Patient not taking: Reported on 7/8/2024) 180 tablet 0    colchicine 0.6 MG Oral Tab Take 1 tablet (0.6 mg total) by mouth daily. 90 tablet 0    acidophilus-pectin Oral Cap Take 1 capsule by mouth daily. (Patient not taking: Reported on 7/8/2024)      Ascorbic Acid (VITAMIN C) 1000 MG Oral Tab Take 1 tablet (1,000 mg total) by mouth daily.      Multiple Vitamins-Minerals (MULTI-VITAMIN/MINERALS) Oral Tab Take 1 tablet by mouth daily. Milk thistle and celery seed      amLODIPine 5 MG Oral Tab Take 1 tablet (5 mg total) by mouth at bedtime. 90 tablet 0    ergocalciferol 1.25 MG (17913 UT) Oral Cap Take 1 capsule (50,000 Units total) by mouth once a week. (Patient not taking: Reported on 7/8/2024) 12 capsule 0    pantoprazole (PROTONIX) 40 MG Oral Tab EC Take 1 tablet (40 mg total) by mouth every morning before breakfast. (Patient not  taking: Reported on 7/8/2024) 90 tablet 1    NON FORMULARY Take 3 tablets by mouth daily. Blood flow 7 (Patient not taking: Reported on 7/8/2024)      NON FORMULARY Take 1 each by mouth daily. Ginger blast (Patient not taking: Reported on 7/8/2024)      carvedilol 25 MG Oral Tab Take 1 tablet (25 mg total) by mouth 2 (two) times daily with meals. 180 tablet 0    Blood Glucose Monitoring Suppl (ONETOUCH VERIO IQ SYSTEM) w/Device Does not apply Kit 1 Device by Other route 2 (two) times daily. Use as directed. 1 kit 0    Glucose Blood (ONETOUCH VERIO) In Vitro Strip Check glucose level daily 100 strip 1    OneTouch Delica Lancets 33G Does not apply Misc 1 Lancet by Finger stick route daily. 100 each 1    HYDROcodone-acetaminophen  MG Oral Tab  (Patient not taking: Reported on 7/8/2024)      HYDROcodone-acetaminophen 5-325 MG Oral Tab  (Patient not taking: Reported on 7/8/2024)      Saw Palmetto, Serenoa repens, (SAW PALMETTO OR) Take by mouth daily.      Omega-3 Fatty Acids (FISH OIL OR) Take by mouth daily.      TURMERIC OR Take by mouth every morning.      Meloxicam 15 MG Oral Tab Take 1 tablet (15 mg total) by mouth daily as needed. (Patient not taking: Reported on 7/8/2024)      FREESTYLE LITE TEST In Vitro Strip TEST DAILY AS DIRECTED (Patient not taking: Reported on 7/8/2024) 100 strip 0    Blood Glucose Monitoring Suppl (FREESTYLE LITE) Does not apply Device   0    Multiple Vitamin (MULTIVITAMINS) Oral Tab Take 1 tablet by mouth daily.         Past Medical History:    Back problem    Biliary calculus    CKD (chronic kidney disease)    Diabetes (HCC)    Diabetes mellitus (HCC)    Essential hypertension    Gout    High blood pressure    History of gallstones    History of morbid obesity    Lumbar stenosis with neurogenic claudication    Osteoarthritis    Renal disorder      Past Surgical History:   Procedure Laterality Date    Excis lumbar disk,one level      Hip replacement surgery  2014    both    Other  surgical history  2014    bilateral hip replacement    Total hip replacement        Social History     Socioeconomic History    Marital status: Single   Tobacco Use    Smoking status: Never    Smokeless tobacco: Never   Vaping Use    Vaping status: Never Used   Substance and Sexual Activity    Alcohol use: No    Drug use: No   Other Topics Concern    Caffeine Concern Yes     Comment: 1 cup of coffee daily and 1-2 cans of Pepsi daily.     Exercise Yes     Comment: 1-2 days/week    Pt has a pacemaker No    Reaction to local anesthetic No   Social History Narrative    Live alone    UA ramp reza    6 grandchildren        No tob - LLNS    No etoh        No pets     Social Determinants of Health      [facility]    Housing Stability       Family History   Adopted: Yes   Problem Relation Age of Onset    Heart Attack Father     Heart Attack Mother         PE:  No vital signs were taken for this video visit.  GENERAL: well developed, well nourished, in no acute distress  SKIN: no rashes on visible parts of skin  HEENT: normocephalic, atraumatic, conjunctiva clear  LUNGS: regular breathing rate and effort with no audible respiratory distress  NEURO: A&Ox3  PSYCH: pleasant mood and affect, appropriate judgement and insight  MUSC: Right hand and fingers swollen. Decreased ROM noted.    ASSESSMENT/PLAN:    Encounter Diagnosis   Name Primary?    Gouty arthropathy Yes       No orders of the defined types were placed in this encounter.      Meds & Refills for this Visit:  Requested Prescriptions      No prescriptions requested or ordered in this encounter       Imaging & Consults:  None    Pt to do 2nd Medrol dose charlene and increase colchicine to 0.6 mg one bid.  Pt to f/u with the rheumatologist on 8/22/24 Dr. Duncan at [place].  The patient indicates understanding of these issues and agrees to the plan.  The patient is asked to call if still having issues.    *Please  note that the following visit was completed using two-way, real-time interactive audio and/or video communication.  This has been done in good sonido to provide continuity of care in the best interest of the provider-patient relationship, due to the ongoing public health crisis/national emergency and because of restrictions of visitation.  There are limitations of this visit as physical exam could not be fully performed.  Every conscious effort was taken to allow for sufficient and adequate time.  Included in this visit, time may have been spent reviewing labs, medications, radiology tests and decision-making.  Appropriate medical decision-making and tests are ordered as detailed in the plan of care above.  Coding/billing information is submitted for this visit based on complexity of care and/or time spent for the visit.      Start Time: 1:00 pm  End Time: 1:14 pm    Lynne CHATTERJEE

## 2024-09-11 DIAGNOSIS — M10.9 GOUTY ARTHROPATHY: ICD-10-CM

## 2024-09-11 RX ORDER — COLCHICINE 0.6 MG/1
0.6 TABLET ORAL DAILY
Qty: 90 TABLET | Refills: 0 | Status: SHIPPED | OUTPATIENT
Start: 2024-09-11

## 2024-09-12 ENCOUNTER — OFFICE VISIT (OUTPATIENT)
Dept: INTERNAL MEDICINE CLINIC | Facility: CLINIC | Age: 69
End: 2024-09-12
Payer: COMMERCIAL

## 2024-09-12 VITALS
SYSTOLIC BLOOD PRESSURE: 132 MMHG | DIASTOLIC BLOOD PRESSURE: 78 MMHG | BODY MASS INDEX: 45.57 KG/M2 | WEIGHT: 290.38 LBS | OXYGEN SATURATION: 97 % | RESPIRATION RATE: 18 BRPM | HEART RATE: 70 BPM | HEIGHT: 67 IN | TEMPERATURE: 98 F

## 2024-09-12 DIAGNOSIS — Z12.5 SPECIAL SCREENING FOR MALIGNANT NEOPLASM OF PROSTATE: ICD-10-CM

## 2024-09-12 DIAGNOSIS — I10 ESSENTIAL HYPERTENSION: ICD-10-CM

## 2024-09-12 DIAGNOSIS — R53.83 FATIGUE, UNSPECIFIED TYPE: Primary | ICD-10-CM

## 2024-09-12 DIAGNOSIS — R06.83 SNORING: ICD-10-CM

## 2024-09-12 DIAGNOSIS — Z00.00 LABORATORY EXAMINATION ORDERED AS PART OF A ROUTINE GENERAL MEDICAL EXAMINATION: ICD-10-CM

## 2024-09-12 DIAGNOSIS — E11.9 DIABETES MELLITUS WITHOUT COMPLICATION (HCC): ICD-10-CM

## 2024-09-12 DIAGNOSIS — M1A.0490 CHRONIC GOUT OF HAND, UNSPECIFIED CAUSE, UNSPECIFIED LATERALITY: ICD-10-CM

## 2024-09-12 PROCEDURE — 99214 OFFICE O/P EST MOD 30 MIN: CPT | Performed by: FAMILY MEDICINE

## 2024-09-12 RX ORDER — PANTOPRAZOLE SODIUM 40 MG/1
40 TABLET, DELAYED RELEASE ORAL
Qty: 90 TABLET | Refills: 0 | Status: SHIPPED | OUTPATIENT
Start: 2024-09-12

## 2024-09-12 RX ORDER — CARVEDILOL 25 MG/1
25 TABLET ORAL 2 TIMES DAILY WITH MEALS
Qty: 180 TABLET | Refills: 0 | Status: SHIPPED | OUTPATIENT
Start: 2024-09-12

## 2024-09-12 NOTE — PROGRESS NOTES
CHIEF COMPLAINT:     Chief Complaint   Patient presents with    Fatigue    Gout       HPI:   Mustapha Meyers is a 69 year old male .  The patient complains of fatigue. The patient has had for over 2 months. Patient describes sx’s of being tired all the time, not motivated to do anything. Pt does not even want to clean his apartment.. The patient feels that sleep is appropriate. Pt does admit he likes to stay up and play video games at night. Patient does snore at night. Pt does not know if he has any apnea because he lives by himself. Admit that the level of exercise has been minimal of late.    Pt thinks he had a gout attack last week in his hands. Pt does not always take  his  colchicine twice a day or even once a day. Pt did take it twice a day last week and the gout flare up went away. Pt is seeing a rheumatologist at [place] on 9/25/24. Pt needs his FMLA filled out for his job for his Gout.    Patient presents for recheck of their hypertension. Pt has been taking medications as instructed, no medication side effects, home BP monitoring has not been done. Pt's weight is down 8# since his last visit in July.  Currently asymptomatic, no chest pains, jaw pains, arm pains. No headaches, dizziness or edema.  No SOB on exertion or rest.  Pt has been following a low fat diet and has been exercising-walking some.    Pt also stopped his Metformin over a year ago because he could not handle the loose stools.  Pt due for labs. He will get them done and then we can proceed on what diabetic medication to give him. Pt to f/u in a month for his Px.  Current Outpatient Medications   Medication Sig Dispense Refill    carvedilol 25 MG Oral Tab Take 1 tablet (25 mg total) by mouth 2 (two) times daily with meals. 180 tablet 0    pantoprazole (PROTONIX) 40 MG Oral Tab EC Take 1 tablet (40 mg total) by mouth every morning before breakfast. 90 tablet 0    COLCHICINE 0.6 MG Oral Tab TAKE 1 TABLET BY MOUTH EVERY DAY 90 tablet 0     IRBESARTAN 150 MG Oral Tab TAKE 1 TABLET BY MOUTH EVERY DAY AT NIGHT 90 tablet 0    Ascorbic Acid (VITAMIN C) 1000 MG Oral Tab Take 1 tablet (1,000 mg total) by mouth daily.      Multiple Vitamins-Minerals (MULTI-VITAMIN/MINERALS) Oral Tab Take 1 tablet by mouth daily. EU naturals      NON FORMULARY Take 3 tablets by mouth daily. Blood flow 7      Blood Glucose Monitoring Suppl (ONETOUCH VERIO IQ SYSTEM) w/Device Does not apply Kit 1 Device by Other route 2 (two) times daily. Use as directed. 1 kit 0    Glucose Blood (ONETOUCH VERIO) In Vitro Strip Check glucose level daily 100 strip 1    OneTouch Delica Lancets 33G Does not apply Misc 1 Lancet by Finger stick route daily. 100 each 1    Saw Palmetto, Serenoa repens, (SAW PALMETTO OR) Take by mouth daily.      Meloxicam 15 MG Oral Tab Take 1 tablet (15 mg total) by mouth daily as needed.      FREESTYLE LITE TEST In Vitro Strip TEST DAILY AS DIRECTED 100 strip 0    Blood Glucose Monitoring Suppl (FREESTYLE LITE) Does not apply Device   0    Multiple Vitamin (MULTIVITAMINS) Oral Tab Take 1 tablet by mouth daily.      HYDROcodone-acetaminophen (NORCO) 5-325 MG Oral Tab Take 1 tablet by mouth every 6 (six) hours as needed for Pain. (Patient not taking: Reported on 9/12/2024) 30 tablet 0    METFORMIN  MG Oral Tablet 24 Hr TAKE 2 TABLETS (1,000 MG TOTAL) BY MOUTH ONCE DAILY. (Patient not taking: Reported on 9/12/2024) 180 tablet 0    acidophilus-pectin Oral Cap Take 1 capsule by mouth daily. (Patient not taking: Reported on 7/8/2024)      ergocalciferol 1.25 MG (82606 UT) Oral Cap Take 1 capsule (50,000 Units total) by mouth once a week. (Patient not taking: Reported on 7/8/2024) 12 capsule 0    HYDROcodone-acetaminophen  MG Oral Tab  (Patient not taking: Reported on 7/8/2024)      HYDROcodone-acetaminophen 5-325 MG Oral Tab  (Patient not taking: Reported on 7/8/2024)      Omega-3 Fatty Acids (FISH OIL OR) Take by mouth daily. (Patient not taking: Reported on  9/12/2024)      TURMERIC OR Take by mouth every morning. (Patient not taking: Reported on 9/12/2024)        Past Medical History:    Back problem    Biliary calculus    CKD (chronic kidney disease)    Diabetes (HCC)    Diabetes mellitus (HCC)    Essential hypertension    Gout    High blood pressure    History of gallstones    History of morbid obesity    Lumbar stenosis with neurogenic claudication    Osteoarthritis    Renal disorder      Social History:  Social History     Socioeconomic History    Marital status: Single   Tobacco Use    Smoking status: Never    Smokeless tobacco: Never   Vaping Use    Vaping status: Never Used   Substance and Sexual Activity    Alcohol use: No    Drug use: No   Other Topics Concern    Caffeine Concern Yes     Comment: 1 cup of coffee daily and 1-2 cans of Pepsi daily.     Exercise Yes     Comment: 1-2 days/week    Pt has a pacemaker No    Reaction to local anesthetic No   Social History Narrative    Live alone    UA ramp reza    6 grandchildren        No tob - LLNS    No etoh        No pets     Social Determinants of Health      Received from [facility], [facility]    Housing Stability        REVIEW OF SYSTEMS:   GENERAL: Denies fever, chills,weight change, decreased appetite  SKIN: Denies rashes, skin wounds or ulcers.  EYES: Denies blurred vision or double vision  HENT: Denies congestion, rhinorrhea, sore throat or ear pain  CHEST: Denies chest pain, or palpitations  LUNGS: Denies shortness of breath, cough, or wheezing  GI: Denies abdominal pain, N/V/C/D.   MUSCULOSKELETAL: see HPI  LYMPH:  Denies lymphadenopathy  NEURO: Denies headaches or lightheadedness      EXAM:   /78 (BP Location: Right arm, Patient Position: Sitting, Cuff Size: large)   Pulse 70   Temp 97.5 °F (36.4 °C) (Temporal)   Resp 18   Ht 5' 7\" (1.702 m)   Wt 290 lb 6.4 oz (131.7 kg)   SpO2 97%   BMI 45.48 kg/m²   GENERAL: well developed, well nourished,in no  apparent distress  SKIN: no rashes,no suspicious lesions  HEAD: atraumatic, normocephalic  EYES: conjunctiva clear  NECK: supple, non-tender  LUNGS: clear to auscultation bilaterally, no wheezes or rhonchi. Breathing is non labored.  CARDIO: RRR without murmur  EXTREMITIES: no cyanosis, clubbing or edema  MUSC: joints unremarkable, no obvious deformity.+ tenderness, and some joint swelling in the hands/fingers. FROM. Muscle tone and strength normal and symmetrical. Motor exam grossly normal, sensation grossly normal. Gait normal and steady.        Physical Exam    ASSESSMENT AND PLAN:     Encounter Diagnoses   Name Primary?    Fatigue, unspecified type Yes    Snoring     Essential hypertension     Laboratory examination ordered as part of a routine general medical examination     Diabetes mellitus without complication (HCC)     Special screening for malignant neoplasm of prostate        Orders Placed This Encounter   Procedures    CBC With Differential With Platelet    Comp Metabolic Panel (14)    Lipid Panel    Hemoglobin A1C    PSA Total, Screen    TSH W Reflex To Free T4    Microalb/Creat Ratio, Random Urine       Meds & Refills for this Visit:  Requested Prescriptions     Signed Prescriptions Disp Refills    carvedilol 25 MG Oral Tab 180 tablet 0     Sig: Take 1 tablet (25 mg total) by mouth 2 (two) times daily with meals.    pantoprazole (PROTONIX) 40 MG Oral Tab EC 90 tablet 0     Sig: Take 1 tablet (40 mg total) by mouth every morning before breakfast.       Imaging & Consults:  OP REFERRAL TO DIAGNOSTIC SLEEP STUDY    PLAN:  1. Fatigue, unspecified type  Check sleep study  - Diagnostic Sleep Study-split night PAP implemented if criteria met  - General sleep study; Future    2. Snoring  Check sllep study  - Diagnostic Sleep Study-split night PAP implemented if criteria met    3. Essential hypertension  Conservative measures dicussed. Continue medication.  Diet and exercise explained and encouraged.  Fasting  labs due.  Home blood pressure monitoring. Pt should measure BP’s two to three times per week. Goal blood pressure at home - < 135/85.   - carvedilol 25 MG Oral Tab; Take 1 tablet (25 mg total) by mouth 2 (two) times daily with meals.  Dispense: 180 tablet; Refill: 0    4. Laboratory examination ordered as part of a routine general medical examination  -schedule Px for October  - CBC With Differential With Platelet; Future  - Comp Metabolic Panel (14); Future  - Lipid Panel; Future  - Hemoglobin A1C; Future  - TSH W Reflex To Free T4; Future    5. Diabetes mellitus without complication (HCC)  Check lab. F/u after labs are back  - Microalb/Creat Ratio, Random Urine; Future    6. Special screening for malignant neoplasm of prostate    - PSA Total, Screen; Future    7. Chronic gout of hand, unspecified cause, unspecified laterality  - Pt to f/u with Rheumatology as planned  - FMLA forms completed.    The patient indicates understanding of these issues and agrees to the plan.  The patient is asked to return in one month for Px/DM check.

## 2024-09-25 ENCOUNTER — OFFICE VISIT (OUTPATIENT)
Dept: RHEUMATOLOGY | Facility: CLINIC | Age: 69
End: 2024-09-25
Payer: COMMERCIAL

## 2024-09-25 VITALS
HEART RATE: 75 BPM | DIASTOLIC BLOOD PRESSURE: 92 MMHG | SYSTOLIC BLOOD PRESSURE: 176 MMHG | WEIGHT: 292 LBS | HEIGHT: 67 IN | BODY MASS INDEX: 45.83 KG/M2

## 2024-09-25 DIAGNOSIS — E11.9 DIABETES MELLITUS WITHOUT COMPLICATION (HCC): ICD-10-CM

## 2024-09-25 DIAGNOSIS — M1A.09X0 CHRONIC GOUT OF MULTIPLE SITES, UNSPECIFIED CAUSE: Primary | ICD-10-CM

## 2024-09-25 DIAGNOSIS — Z79.899 ENCOUNTER FOR LONG-TERM (CURRENT) USE OF HIGH-RISK MEDICATION: ICD-10-CM

## 2024-09-25 DIAGNOSIS — N18.9 CHRONIC KIDNEY DISEASE, UNSPECIFIED CKD STAGE: ICD-10-CM

## 2024-09-25 PROCEDURE — 99214 OFFICE O/P EST MOD 30 MIN: CPT | Performed by: INTERNAL MEDICINE

## 2024-09-25 RX ORDER — ALLOPURINOL 100 MG/1
50 TABLET ORAL DAILY
Qty: 90 TABLET | Refills: 2 | Status: SHIPPED | OUTPATIENT
Start: 2024-09-25

## 2024-09-25 NOTE — PROGRESS NOTES
RHEUMATOLOGY CLINIC- NEW PATIENT    Mustapha Meyers is a 69 year old male.    ASSESSMENT/PLAN:       ICD-10-CM    1. Chronic gout of multiple sites, unspecified cause  M1A.09X0 AST (SGOT)     ALT(SGPT)     CBC With Differential With Platelet     Creatinine, Serum     C-Reactive Protein     Sed Rate, Westergren (Automated)     Uric Acid    Non-Tophaceous      2. Encounter for long-term (current) use of high-risk medication  Z79.899 AST (SGOT)     ALT(SGPT)     CBC With Differential With Platelet     Creatinine, Serum     C-Reactive Protein     Sed Rate, Westergren (Automated)     Uric Acid      3. Diabetes mellitus without complication (HCC)  E11.9       4. Chronic kidney disease, unspecified CKD stage  N18.9             DISCUSSION:  Patient presents as a new outpatient referral from his PCP for longstanding, nontophaceous gout.  Last uric acid 5/2023 10.  Goal uric acid less than 6 given 9 tophaceous disease.  It appears he was on allopurinol in the past but was discontinued but patient is unsure why (denies noted side effect).  Has been on colchicine daily since his last flare a few months ago.  Otherwise, symptoms do improve with a Medrol Dosepak as well.  Going forward, would keep in mind comorbidities including: DM and CKD.    Given the chronicity of his disease and frequent flares, discussed with patient uric acid lowering treatment with allopurinol as well as risk/benefits to which he is agreeable.  Will need to titrate slowly given CKD.  Will continue background colchicine in the meantime for prophylaxis (would like to avoid steroids given DM)    PLAN:  - Goal uric acid less than 6 given non-tophaceous disease  -Start allopurinol 50 mg daily.  Will titrate slowly pending response given CKD  - Continue colchicine 0.6 mg daily prophylaxis until goal uric acid reached  - Consult/evaluation communicated with referring physician/provider.    I will MyChart patient on receipt of above results .  Otherwise,  patient to follow-up in about 2 months with repeat labs from prior.    Jaime Duncan,   9/25/2024   4:22 PM    HPI:     9/25/2024 Initial Consult: I had the pleasure of seeing Mustapha Meyers on 9/25/2024 as a new outpatient consultation for gout. The patient was originally referred by SHENA Sánchez.     69 year old male w/ PMH Gout, allergic rhinitis DM, HLD, HTN, CKD, GERDwho presents to clinic today.  Patient has a longstanding history of gout with flares about 3-4 times a year.  Patient noting flares typically with the change of the seasons.  Last flare over a month ago but no current pain.  States he was previously on allopurinol but he is unsure why it was discontinued.  Flares can occur in his toes, feet, knees with flares typically treated with Medrol Dosepak's with good response.  He has been on colchicine daily since his last flare and was previously taking it on a as needed basis.  No unexplained fever, chills, unintentional weight loss, Raynaud's phenomenon, serositis, uveitis/iritis.        Current Medications:  Colchicine 0.6 mg daily       -BID w/ GI Upset    Medication History:  Medrol Dosepak- helpful     Interval History:   See Above    HISTORY:  Past Medical History:    Back problem    Biliary calculus    CKD (chronic kidney disease)    Diabetes (HCC)    Diabetes mellitus (HCC)    Essential hypertension    Gout    High blood pressure    History of gallstones    History of morbid obesity    Lumbar stenosis with neurogenic claudication    Osteoarthritis    Renal disorder      Social Hx Reviewed   Family Hx Reviewed     Medications (Active prior to today's visit):  Current Outpatient Medications   Medication Sig Dispense Refill    Esomeprazole Magnesium (NEXIUM) 40 MG Oral Capsule Delayed Release Take 1 capsule (40 mg total) by mouth daily. 90 capsule 1    carvedilol 25 MG Oral Tab Take 1 tablet (25 mg total) by mouth 2 (two) times daily with meals. 180 tablet 0    COLCHICINE 0.6 MG Oral Tab  TAKE 1 TABLET BY MOUTH EVERY DAY 90 tablet 0    IRBESARTAN 150 MG Oral Tab TAKE 1 TABLET BY MOUTH EVERY DAY AT NIGHT 90 tablet 0    HYDROcodone-acetaminophen (NORCO) 5-325 MG Oral Tab Take 1 tablet by mouth every 6 (six) hours as needed for Pain. (Patient not taking: Reported on 9/12/2024) 30 tablet 0    METFORMIN  MG Oral Tablet 24 Hr TAKE 2 TABLETS (1,000 MG TOTAL) BY MOUTH ONCE DAILY. (Patient not taking: Reported on 9/12/2024) 180 tablet 0    acidophilus-pectin Oral Cap Take 1 capsule by mouth daily. (Patient not taking: Reported on 7/8/2024)      Ascorbic Acid (VITAMIN C) 1000 MG Oral Tab Take 1 tablet (1,000 mg total) by mouth daily.      Multiple Vitamins-Minerals (MULTI-VITAMIN/MINERALS) Oral Tab Take 1 tablet by mouth daily. EU naturals      ergocalciferol 1.25 MG (87272 UT) Oral Cap Take 1 capsule (50,000 Units total) by mouth once a week. (Patient not taking: Reported on 7/8/2024) 12 capsule 0    NON FORMULARY Take 3 tablets by mouth daily. Blood flow 7      Blood Glucose Monitoring Suppl (ONETOUCH VERIO IQ SYSTEM) w/Device Does not apply Kit 1 Device by Other route 2 (two) times daily. Use as directed. 1 kit 0    Glucose Blood (ONETOUCH VERIO) In Vitro Strip Check glucose level daily 100 strip 1    OneTouch Delica Lancets 33G Does not apply Misc 1 Lancet by Finger stick route daily. 100 each 1    HYDROcodone-acetaminophen  MG Oral Tab  (Patient not taking: Reported on 7/8/2024)      HYDROcodone-acetaminophen 5-325 MG Oral Tab  (Patient not taking: Reported on 7/8/2024)      Saw Palmetto, Serenoa repens, (SAW PALMETTO OR) Take by mouth daily.      Omega-3 Fatty Acids (FISH OIL OR) Take by mouth daily. (Patient not taking: Reported on 9/12/2024)      TURMERIC OR Take by mouth every morning. (Patient not taking: Reported on 9/12/2024)      Meloxicam 15 MG Oral Tab Take 1 tablet (15 mg total) by mouth daily as needed.      FREESTYLE LITE TEST In Vitro Strip TEST DAILY AS DIRECTED 100 strip 0     Blood Glucose Monitoring Suppl (FREESTYLE LITE) Does not apply Device   0    Multiple Vitamin (MULTIVITAMINS) Oral Tab Take 1 tablet by mouth daily.         Allergies:  Allergies   Allergen Reactions    Seasonal Runny nose         ROS:   Review of Systems   Constitutional:  Negative for chills and fever.   HENT:  Negative for congestion, hearing loss, mouth sores, nosebleeds and trouble swallowing.    Eyes:  Negative for photophobia, pain, redness and visual disturbance.   Respiratory:  Negative for cough and shortness of breath.    Cardiovascular:  Negative for chest pain, palpitations and leg swelling.   Gastrointestinal:  Negative for abdominal pain, blood in stool, diarrhea and nausea.   Endocrine: Negative for cold intolerance and heat intolerance.   Genitourinary:  Negative for dysuria, frequency and hematuria.   Musculoskeletal:  Positive for arthralgias. Negative for back pain, gait problem, joint swelling, myalgias, neck pain and neck stiffness.   Skin:  Negative for color change and rash.   Neurological:  Negative for dizziness, weakness, numbness and headaches.   Psychiatric/Behavioral:  Negative for confusion and dysphoric mood.         PHYSICAL EXAM:     Constitutional:  Well developed, Well nourished, No acute distress  HENT:  Normocephalic, Atraumatic, Bilateral external ears normal, Oropharynx moist, No oral exudates.  Neck: Normal range of motion, No tenderness, Supple, No stridor.  Eyes:  PERRL, EOMI, Conjunctiva normal, No discharge.  Respiratory:  Normal breath sounds, No respiratory distress, No wheezing.  Cardiovascular:  Normal heart rate, Normal rhythm, No murmurs, No rubs, No gallops.  GI:  Bowel sounds normal, Soft, No tenderness, No masses, No pulsatile masses.  : No CVA tenderness.  Musculoskeletal:  A comprehensive 28 count joint exam was done and was negative for swelling or tenderness except as noted. Inspections for misalignment, asymmetry, crepitation, defects, tenderness, masses,  nodules, effusions, range of motion, and stability in the upper and lower extremities bilaterally are all normal unless noted.           Integument:  Warm, Dry, No erythema, No rash.  Lymphatic:  No lymphadenopathy noted.  Neurologic:  Alert & oriented x 3, Normal motor function, Normal sensory function, No focal deficits noted.  Psychiatric:  Affect normal, Judgment normal, Mood normal.    LABS:     Prior lab work reviewed and notable for:     2023:  CBC without cytopenias or leukocytosis  BMP with BUN 14, CR 1.73 (high)    11/3/2023:  TSH 1.4  CMP with normal AST/ALT, alk phos 161 (high), no gamma gap noted    2023:  Uric acid 10  ESR 25 (high), CRP 0.38 borderline high  RF less than 10 WNL    Imagin/10/2024 right hand/wrist XR:    FINDINGS:  BONES: Widening of the scapholunate interval.  No acute fracture.  First CMC joint osteoarthritis.  No suspicious bone lesion.  Ulnar minus variance.  Small cysts or intraosseous ganglia in the carpus.     OTHER: Negative.               Impression   CONCLUSION:  1. No acute finding.  2. Widened scapholunate interval suggests a scapholunate ligament tear.

## 2024-10-19 DIAGNOSIS — I10 ESSENTIAL HYPERTENSION: ICD-10-CM

## 2024-10-19 RX ORDER — AMLODIPINE BESYLATE 5 MG/1
5 TABLET ORAL NIGHTLY
Qty: 90 TABLET | Refills: 0 | Status: SHIPPED | OUTPATIENT
Start: 2024-10-19

## 2024-10-19 RX ORDER — IRBESARTAN 150 MG/1
150 TABLET ORAL NIGHTLY
Qty: 90 TABLET | Refills: 0 | Status: SHIPPED | OUTPATIENT
Start: 2024-10-19

## 2024-12-12 DIAGNOSIS — E55.9 VITAMIN D DEFICIENCY: ICD-10-CM

## 2024-12-12 RX ORDER — ERGOCALCIFEROL 1.25 MG/1
50000 CAPSULE, LIQUID FILLED ORAL WEEKLY
Qty: 12 CAPSULE | Refills: 0 | OUTPATIENT
Start: 2024-12-12

## 2024-12-23 ENCOUNTER — TELEPHONE (OUTPATIENT)
Dept: ORTHOPEDICS CLINIC | Facility: CLINIC | Age: 69
End: 2024-12-23

## 2024-12-23 NOTE — TELEPHONE ENCOUNTER
Patient is seeing Dr. Cross for left hand middle trigger finger pain. Please advise if imaging is needed.   Future Appointments   Date Time Provider Department Center   12/26/2024 11:15 AM Lynne Sánchez APRN [facility] [place]   12/27/2024 11:30 AM Jaime Duncan DO [facility]   2/5/2025 11:00 AM Sammy Cross MD [facility] [place]

## 2024-12-23 NOTE — TELEPHONE ENCOUNTER
Patient will be seen first. No new imaging is needed for trigger finger. Per 's protocol . Thanks

## 2024-12-30 ENCOUNTER — OFFICE VISIT (OUTPATIENT)
Dept: INTERNAL MEDICINE CLINIC | Facility: CLINIC | Age: 69
End: 2024-12-30
Payer: COMMERCIAL

## 2024-12-30 VITALS
TEMPERATURE: 98 F | SYSTOLIC BLOOD PRESSURE: 136 MMHG | OXYGEN SATURATION: 95 % | HEART RATE: 62 BPM | DIASTOLIC BLOOD PRESSURE: 86 MMHG | HEIGHT: 67 IN | RESPIRATION RATE: 20 BRPM | WEIGHT: 300.19 LBS | BODY MASS INDEX: 47.11 KG/M2

## 2024-12-30 DIAGNOSIS — I10 ESSENTIAL HYPERTENSION: ICD-10-CM

## 2024-12-30 DIAGNOSIS — M25.561 CHRONIC PAIN OF BOTH KNEES: ICD-10-CM

## 2024-12-30 DIAGNOSIS — M79.661 PAIN IN BOTH LOWER LEGS: Primary | ICD-10-CM

## 2024-12-30 DIAGNOSIS — E78.5 HYPERLIPIDEMIA, UNSPECIFIED HYPERLIPIDEMIA TYPE: ICD-10-CM

## 2024-12-30 DIAGNOSIS — E11.9 DIABETES MELLITUS WITHOUT COMPLICATION (HCC): ICD-10-CM

## 2024-12-30 DIAGNOSIS — M79.662 PAIN IN BOTH LOWER LEGS: Primary | ICD-10-CM

## 2024-12-30 DIAGNOSIS — G89.29 CHRONIC PAIN OF BOTH KNEES: ICD-10-CM

## 2024-12-30 DIAGNOSIS — M25.562 CHRONIC PAIN OF BOTH KNEES: ICD-10-CM

## 2024-12-30 PROCEDURE — 99214 OFFICE O/P EST MOD 30 MIN: CPT | Performed by: FAMILY MEDICINE

## 2024-12-30 RX ORDER — IRBESARTAN 150 MG/1
150 TABLET ORAL NIGHTLY
Qty: 90 TABLET | Refills: 0 | Status: SHIPPED | OUTPATIENT
Start: 2024-12-30

## 2024-12-30 RX ORDER — CARVEDILOL 25 MG/1
25 TABLET ORAL 2 TIMES DAILY WITH MEALS
Qty: 180 TABLET | Refills: 0 | Status: SHIPPED | OUTPATIENT
Start: 2024-12-30

## 2024-12-30 RX ORDER — AMLODIPINE BESYLATE 5 MG/1
5 TABLET ORAL NIGHTLY
Qty: 90 TABLET | Refills: 0 | Status: SHIPPED | OUTPATIENT
Start: 2024-12-30

## 2024-12-30 NOTE — PROGRESS NOTES
CHIEF COMPLAINT:     Chief Complaint   Patient presents with    Leg Pain     Cannot remember which leg.     Knee Pain     Saw ortho. States need knee surgery.     Hypertension       HPI:   Mustapha Meyers is a 69 year old male   Patient presents for recheck of  Hypertension and hyperlipidemia. Pt has been taking medications but has only been taking the carvedilol once a day and not twice a day , no medication side effects, home BP monitoring has not been done.  Pt has not been taking his Metformin because he could not tolerate it. It caused diarrhea. Pt wants to know what else he can take . Discussed trying Ozempic and Mounjaro. Pt will check to see if his insurance covers it. If not not consider farxiga.  Currently asymptomatic, no chest pains, jaw pains, arm pains. No headaches, dizziness or edema.  No SOB on exertion or rest.  Patient denies any myalgias or arthralgias on the cholesterol  medication. Pt has been following a low fat diet and has not been exercising due to knee pain.  Current lipid treatment: diet only    Pt did see his Ortho Dr. Kingston for bilateral knee pain. Pt was told he probably needs surgery on both knees but needs to work on losing weight. Pt takes his Mobic when the pain gets bad and it helps.     Pt has also been having some calf cramping and pain on and off. Pt states he is very stiff in the morning until he gets moving/stretching his legs and his Joints improve. But since he does get some cramping in his legs he would like to check a doppler on his legs.    FYI- Pt has an appt with Dr. Cross the hand specialist for his hand pain on 2/5/25. Pt sees his rheumatologist on 1/24/25.    Current Outpatient Medications   Medication Sig Dispense Refill    AMLODIPINE 5 MG Oral Tab TAKE 1 TABLET BY MOUTH EVERYDAY AT BEDTIME 90 tablet 0    IRBESARTAN 150 MG Oral Tab TAKE 1 TABLET BY MOUTH EVERY DAY AT NIGHT 90 tablet 0    carvedilol 25 MG Oral Tab Take 1 tablet (25 mg total) by mouth 2 (two)  times daily with meals. (Patient taking differently: Take 1 tablet (25 mg total) by mouth daily.) 180 tablet 0    COLCHICINE 0.6 MG Oral Tab TAKE 1 TABLET BY MOUTH EVERY DAY 90 tablet 0    Saw Palmetto, Serenoa repens, (SAW PALMETTO OR) Take by mouth daily.      Meloxicam 15 MG Oral Tab Take 1 tablet (15 mg total) by mouth daily as needed.      FREESTYLE LITE TEST In Vitro Strip TEST DAILY AS DIRECTED 100 strip 0    Blood Glucose Monitoring Suppl (FREESTYLE LITE) Does not apply Device   0    Multiple Vitamin (MULTIVITAMINS) Oral Tab Take 1 tablet by mouth daily.      allopurinol 100 MG Oral Tab Take 0.5 tablets (50 mg total) by mouth daily. (Patient not taking: Reported on 12/30/2024) 90 tablet 2    Ascorbic Acid (VITAMIN C) 1000 MG Oral Tab Take 1 tablet (1,000 mg total) by mouth daily. (Patient not taking: Reported on 12/30/2024)      Multiple Vitamins-Minerals (MULTI-VITAMIN/MINERALS) Oral Tab Take 1 tablet by mouth daily. EU naturals (Patient not taking: Reported on 12/30/2024)      NON FORMULARY Take 3 tablets by mouth daily. Blood flow 7 (Patient not taking: Reported on 12/30/2024)        Past Medical History:    Back problem    Biliary calculus    CKD (chronic kidney disease)    Diabetes (HCC)    Diabetes mellitus (HCC)    Essential hypertension    Gout    High blood pressure    History of gallstones    History of morbid obesity    Lumbar stenosis with neurogenic claudication    Osteoarthritis    Renal disorder      Social History:  Social History     Socioeconomic History    Marital status: Single   Tobacco Use    Smoking status: Never    Smokeless tobacco: Never   Vaping Use    Vaping status: Never Used   Substance and Sexual Activity    Alcohol use: No    Drug use: No   Other Topics Concern    Caffeine Concern Yes     Comment: 1 cup of coffee daily and 1-2 cans of Pepsi daily.     Exercise Yes     Comment: 1-2 days/week    Pt has a pacemaker No    Reaction to local anesthetic No   Social History Narrative     Live alone    UA ramp reza    6 grandchildren        No tob - LLNS    No etoh        No pets     Social Drivers of Health      [facility]    Housing Stability        REVIEW OF SYSTEMS:   GENERAL: Denies fever, chills,weight change, decreased appetite  SKIN: Denies rashes, skin wounds or ulcers.  EYES: Denies blurred vision or double vision  HENT: Denies congestion, rhinorrhea, sore throat or ear pain  CHEST: Denies chest pain, or palpitations  LUNGS: Denies shortness of breath, cough, or wheezing  NEURO: Denies headaches or lightheadedness  MUSC: see HPI    EXAM:   /86 (BP Location: Right arm, Cuff Size: large)   Pulse 62   Temp 97.6 °F (36.4 °C) (Temporal)   Resp 20   Ht 5' 7\" (1.702 m)   Wt (!) 300 lb 3.2 oz (136.2 kg)   SpO2 95%   BMI 47.02 kg/m²   GENERAL: well developed, well nourished,in no apparent distress  SKIN: no rashes,no suspicious lesions  HEAD: atraumatic, normocephalic  EYES: conjunctiva clear, EOM intact, PERRLA  HEENT: ears,nose and throat clear  NECK: supple, non-tender  LUNGS: clear to auscultation bilaterally, no wheezes or rhonchi. Breathing is non labored.  CARDIO: RRR without murmur  EXTREMITIES: no cyanosis, clubbing or edema. ( Pt refused foot exam, will do at his Px)  MUSC: + bilateral knee pain in the joint, minimal swelling. Currently no calf pain. ROM stiff. Gait steady.    ASSESSMENT AND PLAN:     Encounter Diagnoses   Name Primary?    Pain in both lower legs Yes    Diabetes mellitus without complication (HCC)     Essential hypertension     Chronic pain of both knees     Hyperlipidemia, unspecified hyperlipidemia type        No orders of the defined types were placed in this encounter.      Meds & Refills for this Visit:  Requested Prescriptions     Signed Prescriptions Disp Refills    amLODIPine 5 MG Oral Tab 90 tablet 0     Sig: Take 1 tablet (5 mg total) by mouth nightly.    carvedilol 25 MG Oral Tab  180 tablet 0     Sig: Take 1 tablet (25 mg total) by mouth 2 (two) times daily with meals.    Irbesartan 150 MG Oral Tab 90 tablet 0     Sig: Take 1 tablet (150 mg total) by mouth nightly.       Imaging & Consults:  US VENOUS INSUFFICIENCY (REFLUX) BILAT LOWER EXT (CPT=93970)    1. Pain in both lower legs  -check doppler. Continue to stretch and take NSAID as needed  - US VENOUS INSUFFICIENCY (REFLUX) BILAT LOWER EXT (CPT=93970); Future    2. Diabetes mellitus without complication (HCC)  - Labs due. Pt to check with insurance to see if Ozempic or Mounjaro is covered. Pt to avoid regular soda and start to limit carbs    3. Essential hypertension  Conservative measures dicussed. Continue medication. Pt to take Carvedilol twice a day.  Diet and exercise explained and encouraged. Avoid salt and caffeine   Home blood pressure monitoring. Pt should measure BP’s two to three times per week. Goal blood pressure at home - < 135/85.     - amLODIPine 5 MG Oral Tab; Take 1 tablet (5 mg total) by mouth nightly.  Dispense: 90 tablet; Refill: 0  - carvedilol 25 MG Oral Tab; Take 1 tablet (25 mg total) by mouth 2 (two) times daily with meals.  Dispense: 180 tablet; Refill: 0  - Irbesartan 150 MG Oral Tab; Take 1 tablet (150 mg total) by mouth nightly.  Dispense: 90 tablet; Refill: 0    4. Chronic pain of both knees  - continue to see Ortho and take Mobic as needed    5. Hyperlipidemia, unspecified hyperlipidemia type  Pt to increase exercise, choose better fats,  increase fiber and avoid processed foods. Lab due, orders are already in the system.      The patient indicates understanding of these issues and agrees to the plan.  The patient is asked to return in 1-2 months for Px..    I spent 30 minutes preparing to see the patient,reviewing patient's chart,results,history,medical decision making,placing orders,counseling/coordinating care and documenting in the chart.

## 2025-01-05 DIAGNOSIS — M10.9 GOUTY ARTHROPATHY: ICD-10-CM

## 2025-01-06 RX ORDER — COLCHICINE 0.6 MG/1
0.6 TABLET ORAL DAILY
Qty: 90 TABLET | Refills: 0 | Status: SHIPPED | OUTPATIENT
Start: 2025-01-06

## 2025-01-08 RX ORDER — METHYLPREDNISOLONE 4 MG/1
TABLET ORAL
Qty: 21 EACH | Refills: 0 | OUTPATIENT
Start: 2025-01-08

## 2025-01-10 NOTE — TELEPHONE ENCOUNTER
Patient read message  No response  Future Appointments   Date Time Provider Department Center   1/11/2025 11:30 AM Lynne Sánchez APRN [facility] [place]   1/24/2025  2:30 PM Jaime Duncan DO [facility]   2/5/2025 11:00 AM Sammy Cross MD [facility] [place]

## 2025-01-11 ENCOUNTER — LAB ENCOUNTER (OUTPATIENT)
Dept: LAB | Age: 70
End: 2025-01-11
Attending: INTERNAL MEDICINE
Payer: COMMERCIAL

## 2025-01-11 ENCOUNTER — OFFICE VISIT (OUTPATIENT)
Dept: INTERNAL MEDICINE CLINIC | Facility: CLINIC | Age: 70
End: 2025-01-11
Payer: COMMERCIAL

## 2025-01-11 VITALS
SYSTOLIC BLOOD PRESSURE: 138 MMHG | TEMPERATURE: 98 F | DIASTOLIC BLOOD PRESSURE: 78 MMHG | OXYGEN SATURATION: 95 % | BODY MASS INDEX: 47.24 KG/M2 | HEIGHT: 67 IN | WEIGHT: 301 LBS | HEART RATE: 60 BPM | RESPIRATION RATE: 18 BRPM

## 2025-01-11 DIAGNOSIS — R10.13 EPIGASTRIC PAIN: Primary | ICD-10-CM

## 2025-01-11 DIAGNOSIS — Z79.899 ENCOUNTER FOR LONG-TERM (CURRENT) USE OF HIGH-RISK MEDICATION: ICD-10-CM

## 2025-01-11 DIAGNOSIS — E11.9 DIABETES MELLITUS WITHOUT COMPLICATION (HCC): ICD-10-CM

## 2025-01-11 DIAGNOSIS — K21.9 GASTROESOPHAGEAL REFLUX DISEASE WITHOUT ESOPHAGITIS: ICD-10-CM

## 2025-01-11 DIAGNOSIS — Z00.00 LABORATORY EXAMINATION ORDERED AS PART OF A ROUTINE GENERAL MEDICAL EXAMINATION: ICD-10-CM

## 2025-01-11 DIAGNOSIS — Z12.5 SPECIAL SCREENING FOR MALIGNANT NEOPLASM OF PROSTATE: ICD-10-CM

## 2025-01-11 DIAGNOSIS — M10.9 GOUTY ARTHROPATHY: ICD-10-CM

## 2025-01-11 DIAGNOSIS — M1A.09X0 CHRONIC GOUT OF MULTIPLE SITES, UNSPECIFIED CAUSE: ICD-10-CM

## 2025-01-11 LAB
ALBUMIN SERPL-MCNC: 3.8 G/DL (ref 3.2–4.8)
ALBUMIN/GLOB SERPL: 1.1 {RATIO} (ref 1–2)
ALP LIVER SERPL-CCNC: 154 U/L
ALT SERPL-CCNC: 33 U/L
ANION GAP SERPL CALC-SCNC: 8 MMOL/L (ref 0–18)
AST SERPL-CCNC: 34 U/L (ref ?–34)
BASOPHILS # BLD AUTO: 0.03 X10(3) UL (ref 0–0.2)
BASOPHILS NFR BLD AUTO: 0.6 %
BILIRUB SERPL-MCNC: 0.8 MG/DL (ref 0.2–1.1)
BUN BLD-MCNC: 18 MG/DL (ref 9–23)
CALCIUM BLD-MCNC: 9.4 MG/DL (ref 8.7–10.4)
CHLORIDE SERPL-SCNC: 105 MMOL/L (ref 98–112)
CHOLEST SERPL-MCNC: 159 MG/DL (ref ?–200)
CO2 SERPL-SCNC: 28 MMOL/L (ref 21–32)
COMPLEXED PSA SERPL-MCNC: 0.52 NG/ML (ref ?–4)
CREAT BLD-MCNC: 1.4 MG/DL
CREAT UR-SCNC: 150.2 MG/DL
CRP SERPL-MCNC: 1.7 MG/DL (ref ?–0.5)
EGFRCR SERPLBLD CKD-EPI 2021: 55 ML/MIN/1.73M2 (ref 60–?)
EOSINOPHIL # BLD AUTO: 0.13 X10(3) UL (ref 0–0.7)
EOSINOPHIL NFR BLD AUTO: 2.4 %
ERYTHROCYTE [DISTWIDTH] IN BLOOD BY AUTOMATED COUNT: 14.1 %
ERYTHROCYTE [SEDIMENTATION RATE] IN BLOOD: 40 MM/HR
EST. AVERAGE GLUCOSE BLD GHB EST-MCNC: 140 MG/DL (ref 68–126)
FASTING PATIENT LIPID ANSWER: YES
FASTING STATUS PATIENT QL REPORTED: YES
GLOBULIN PLAS-MCNC: 3.5 G/DL (ref 2–3.5)
GLUCOSE BLD-MCNC: 128 MG/DL (ref 70–99)
HBA1C MFR BLD: 6.5 % (ref ?–5.7)
HCT VFR BLD AUTO: 39.2 %
HDLC SERPL-MCNC: 36 MG/DL (ref 40–59)
HGB BLD-MCNC: 13.1 G/DL
IMM GRANULOCYTES # BLD AUTO: 0.01 X10(3) UL (ref 0–1)
IMM GRANULOCYTES NFR BLD: 0.2 %
LDLC SERPL CALC-MCNC: 99 MG/DL (ref ?–100)
LYMPHOCYTES # BLD AUTO: 1.45 X10(3) UL (ref 1–4)
LYMPHOCYTES NFR BLD AUTO: 27.1 %
MCH RBC QN AUTO: 30.7 PG (ref 26–34)
MCHC RBC AUTO-ENTMCNC: 33.4 G/DL (ref 31–37)
MCV RBC AUTO: 91.8 FL
MICROALBUMIN UR-MCNC: 81 MG/DL
MICROALBUMIN/CREAT 24H UR-RTO: 539.3 UG/MG (ref ?–30)
MONOCYTES # BLD AUTO: 0.56 X10(3) UL (ref 0.1–1)
MONOCYTES NFR BLD AUTO: 10.4 %
NEUTROPHILS # BLD AUTO: 3.18 X10 (3) UL (ref 1.5–7.7)
NEUTROPHILS # BLD AUTO: 3.18 X10(3) UL (ref 1.5–7.7)
NEUTROPHILS NFR BLD AUTO: 59.3 %
NONHDLC SERPL-MCNC: 123 MG/DL (ref ?–130)
OSMOLALITY SERPL CALC.SUM OF ELEC: 296 MOSM/KG (ref 275–295)
PLATELET # BLD AUTO: 186 10(3)UL (ref 150–450)
POTASSIUM SERPL-SCNC: 4.4 MMOL/L (ref 3.5–5.1)
PROT SERPL-MCNC: 7.3 G/DL (ref 5.7–8.2)
RBC # BLD AUTO: 4.27 X10(6)UL
SODIUM SERPL-SCNC: 141 MMOL/L (ref 136–145)
TRIGL SERPL-MCNC: 131 MG/DL (ref 30–149)
TSI SER-ACNC: 1.35 UIU/ML (ref 0.55–4.78)
URATE SERPL-MCNC: 9 MG/DL
VLDLC SERPL CALC-MCNC: 22 MG/DL (ref 0–30)
WBC # BLD AUTO: 5.4 X10(3) UL (ref 4–11)

## 2025-01-11 PROCEDURE — 82570 ASSAY OF URINE CREATININE: CPT

## 2025-01-11 PROCEDURE — 84550 ASSAY OF BLOOD/URIC ACID: CPT

## 2025-01-11 PROCEDURE — 84443 ASSAY THYROID STIM HORMONE: CPT

## 2025-01-11 PROCEDURE — 85652 RBC SED RATE AUTOMATED: CPT

## 2025-01-11 PROCEDURE — 83036 HEMOGLOBIN GLYCOSYLATED A1C: CPT

## 2025-01-11 PROCEDURE — 82043 UR ALBUMIN QUANTITATIVE: CPT

## 2025-01-11 PROCEDURE — 86140 C-REACTIVE PROTEIN: CPT

## 2025-01-11 PROCEDURE — 85025 COMPLETE CBC W/AUTO DIFF WBC: CPT

## 2025-01-11 PROCEDURE — 99214 OFFICE O/P EST MOD 30 MIN: CPT | Performed by: FAMILY MEDICINE

## 2025-01-11 PROCEDURE — 36415 COLL VENOUS BLD VENIPUNCTURE: CPT

## 2025-01-11 PROCEDURE — 80061 LIPID PANEL: CPT

## 2025-01-11 PROCEDURE — 80053 COMPREHEN METABOLIC PANEL: CPT

## 2025-01-11 RX ORDER — METHYLPREDNISOLONE 4 MG/1
TABLET ORAL
Qty: 1 EACH | Refills: 0 | Status: SHIPPED | OUTPATIENT
Start: 2025-01-11

## 2025-01-11 RX ORDER — PANTOPRAZOLE SODIUM 40 MG/1
40 TABLET, DELAYED RELEASE ORAL
Qty: 90 TABLET | Refills: 0 | Status: SHIPPED | OUTPATIENT
Start: 2025-01-11

## 2025-01-11 NOTE — PROGRESS NOTES
Mustapha Meyers is a 69 year old male who presents for abdominal pain.   Pain is located at Epigastric. Pain is described as burning, sharp. Severity is off and on. Associated symptoms: belching,burping,flatulence. The pain radiates to none.  Has had on and off since the holidays. Pt admits to eating foods he shouldn't over the holiday. Pt has no pain currently. Gets relief of pain with pepcid usually  but he ran out.. Appetite is good. Any weight loss: none. Pt has a known hx of gall stones.   Denies: jaundice, abnormal discoloration of the stool or skin, no anorexia,  vomiting. No pruritis, no abdominal distention.No yellow discoloration of the sclera.No travel, no sick contacts, no suspicious food.      The patient presents with symptoms of an acute gout attack.   Pt complains of swelling, redness and pain in the left wrist/hand since last Saturday. It has improved a little with restarting the colchicine. Taking the antiinflammatory-Mobic also helps.  Has had for 7 days. Pt feels it is because he had a lot of rich foods over the holiday and ate a lot of red meat.  Has tried the following medications: allopurinol and colchicine  Has had several  gout attacks in the past.  Denies any fever or red streaks above the joint.    FYI- Pt has not started the Mounjaro yet. He wanted to feel better before he started it.    Wt Readings from Last 6 Encounters:   01/11/25 (!) 301 lb (136.5 kg)   12/30/24 (!) 300 lb 3.2 oz (136.2 kg)   09/25/24 292 lb (132.5 kg)   09/12/24 290 lb 6.4 oz (131.7 kg)   07/08/24 298 lb (135.2 kg)   04/08/24 (!) 301 lb 9.6 oz (136.8 kg)     Body mass index is 47.14 kg/m².     Cholesterol, Total (mg/dL)   Date Value   11/03/2023 177   07/05/2022 183   12/29/2020 162     CHOLESTEROL, TOTAL (mg/dL)   Date Value   06/18/2013 175   10/01/2012 184   01/09/2012 150     HDL Cholesterol (mg/dL)   Date Value   11/03/2023 47   07/05/2022 47   12/29/2020 41     HDL CHOLESTEROL (mg/dL)   Date Value    06/18/2013 43   10/01/2012 41   01/09/2012 44     LDL Cholesterol (mg/dL)   Date Value   11/03/2023 105 (H)   07/05/2022 111 (H)   12/29/2020 98     LDL-CHOLESTEROL (mg/dL (calc))   Date Value   06/18/2013 107   10/01/2012 111   01/09/2012 89     AST (U/L)   Date Value   11/03/2023 31   09/13/2022 32   07/05/2022 28   10/30/2013 36 (H)   06/18/2013 22   10/01/2012 22     ALT (U/L)   Date Value   11/03/2023 53   09/13/2022 42   07/05/2022 26   10/30/2013 54 (H)   06/18/2013 27   10/01/2012 30      Current Outpatient Medications   Medication Sig Dispense Refill    pantoprazole (PROTONIX) 40 MG Oral Tab EC Take 1 tablet (40 mg total) by mouth every morning before breakfast. 90 tablet 0    methylPREDNISolone (MEDROL) 4 MG Oral Tablet Therapy Pack As directed. 1 each 0    COLCHICINE 0.6 MG Oral Tab TAKE 1 TABLET BY MOUTH EVERY DAY 90 tablet 0    amLODIPine 5 MG Oral Tab Take 1 tablet (5 mg total) by mouth nightly. 90 tablet 0    carvedilol 25 MG Oral Tab Take 1 tablet (25 mg total) by mouth 2 (two) times daily with meals. 180 tablet 0    Irbesartan 150 MG Oral Tab Take 1 tablet (150 mg total) by mouth nightly. 90 tablet 0    allopurinol 100 MG Oral Tab Take 0.5 tablets (50 mg total) by mouth daily. 90 tablet 2    Meloxicam 15 MG Oral Tab Take 1 tablet (15 mg total) by mouth daily as needed.      FREESTYLE LITE TEST In Vitro Strip TEST DAILY AS DIRECTED 100 strip 0    Blood Glucose Monitoring Suppl (FREESTYLE LITE) Does not apply Device   0    Multiple Vitamin (MULTIVITAMINS) Oral Tab Take 1 tablet by mouth daily.      Tirzepatide (MOUNJARO) 2.5 MG/0.5ML Subcutaneous Solution Auto-injector Inject 2.5 mg into the skin once a week. (Patient not taking: Reported on 1/11/2025) 2 mL 0    Ascorbic Acid (VITAMIN C) 1000 MG Oral Tab Take 1 tablet (1,000 mg total) by mouth daily. (Patient not taking: Reported on 1/11/2025)      Multiple Vitamins-Minerals (MULTI-VITAMIN/MINERALS) Oral Tab Take 1 tablet by mouth daily. EU naturals  (Patient not taking: Reported on 1/11/2025)      NON FORMULARY Take 3 tablets by mouth daily. Blood flow 7 (Patient not taking: Reported on 1/11/2025)      Saw Palmetto, Serenoa repens, (SAW PALMETTO OR) Take by mouth daily. (Patient not taking: Reported on 1/11/2025)        Past Medical History:    Back problem    Biliary calculus    CKD (chronic kidney disease)    Diabetes (HCC)    Diabetes mellitus (HCC)    Essential hypertension    Gout    High blood pressure    History of gallstones    History of morbid obesity    Lumbar stenosis with neurogenic claudication    Osteoarthritis    Renal disorder      Past Surgical History:   Procedure Laterality Date    Excis lumbar disk,one level      Hip replacement surgery  2014    both    Other surgical history  2014    bilateral hip replacement    Total hip replacement        Family History   Adopted: Yes   Problem Relation Age of Onset    Heart Attack Father     Heart Attack Mother       Social History:  Social History     Socioeconomic History    Marital status: Single   Tobacco Use    Smoking status: Never    Smokeless tobacco: Never   Vaping Use    Vaping status: Never Used   Substance and Sexual Activity    Alcohol use: No    Drug use: No   Other Topics Concern    Caffeine Concern Yes     Comment: 1 cup of coffee daily and 1-2 cans of Pepsi daily.     Exercise Yes     Comment: 1-2 days/week    Pt has a pacemaker No    Reaction to local anesthetic No   Social History Narrative    Live alone    UA ramp reza    6 grandchildren        No tob - LLNS    No etoh        No pets     Social Drivers of Health      [facility]    Housing Stability         REVIEW OF SYSTEMS:   GENERAL: feels tired, no lethargy, no fever, no chills  SKIN: denies any unusual skin lesions, rash.  HEENT:no abnormal taste  LUNGS: denies shortness of breath with exertion  CARDIOVASCULAR: denies chest pain on exertion  GI: as above. no rectal  bleeding.No vomiting.  :no dysuria.  MUSCULOSKELETAL: see HPI  NEURO: denies headaches      EXAM:   /78 (BP Location: Left arm, Patient Position: Sitting, Cuff Size: large)   Pulse 60   Temp 97.7 °F (36.5 °C) (Temporal)   Resp 18   Ht 5' 7\" (1.702 m)   Wt (!) 301 lb (136.5 kg)   SpO2 95%   BMI 47.14 kg/m²   Body mass index is 47.14 kg/m².   GENERAL: well developed, well nourished,in no apparent distress  SKIN: no rashes,no suspicious lesions  HEENT:clear  EYES:conjunctiva blood shot  NECK: supple,no adenopathy  LUNGS: clear to auscultation  CARDIO: RRR without murmur  GI: good BS's,no masses, HSM. + epigastric  tenderness, Hartman negative, no guarding, no Psoas sign.   EXTREMITIES: no edema  WRIST/HAND Left: mild swelling noted in the wrist and thumb and palm of hand. Hand warm to tough, slightly reddish. FROM but tender to move.  NEURO: Alert & Oriented x 3.  CN II-XII intact. Moving all 4 extremities without difficulty.Gait and station normal.   No facial droop.  No slurred speech.. Muscle tone and strength normal and symmetric. Motor and sensation grossly normal.      ASSESSMENT AND PLAN:   Mustapha Meyers is a 69 year old male who presents for abdominal pain.  Encounter Diagnoses   Name Primary?    Epigastric pain Yes    Gastroesophageal reflux disease without esophagitis     Gouty arthropathy        No orders of the defined types were placed in this encounter.      Meds & Refills for this Visit:  Requested Prescriptions     Signed Prescriptions Disp Refills    pantoprazole (PROTONIX) 40 MG Oral Tab EC 90 tablet 0     Sig: Take 1 tablet (40 mg total) by mouth every morning before breakfast.    methylPREDNISolone (MEDROL) 4 MG Oral Tablet Therapy Pack 1 each 0     Sig: As directed.       Imaging & Consults:  None  1. Epigastric pain  2. Gastroesophageal reflux disease without esophagitis   Pt needs to lose wgt to improve symptoms. Also patient should avoid eating or drinking two to three hours  before bed. The following should be avoided: caffeine containing drinks/foods, tobacco, mints, fried foods or anyother foods noted by the patient to cause heartburn sx's. Pt should elevate the head of the bed four to six inches to help alleviate sx's. Pt will be put on: Protonix.Patient is agreeable and will follow up if not improving. Pt may need to go see GI.  - pantoprazole (PROTONIX) 40 MG Oral Tab EC; Take 1 tablet (40 mg total) by mouth every morning before breakfast.  Dispense: 90 tablet; Refill: 0    3. Gouty arthropathy  Will treat acutely with Medrol dose charlene.   Will check: serum uric acid.  Discussed dietary modifications to decrease the risk.  - methylPREDNISolone (MEDROL) 4 MG Oral Tablet Therapy Pack; As directed.  Dispense: 1 each; Refill: 0    The patient indicates understanding of these issues and agrees to the plan.  Patient to call if not improving or has concerns.    I spent 30 minutes preparing to see the patient,reviewing patient's chart,results,history,medical decision making,placing orders,counseling/coordinating care and documenting in the chart.

## 2025-01-12 DIAGNOSIS — E11.9 DIABETES MELLITUS WITHOUT COMPLICATION (HCC): Primary | ICD-10-CM

## 2025-01-15 ENCOUNTER — TELEPHONE (OUTPATIENT)
Dept: INTERNAL MEDICINE CLINIC | Facility: CLINIC | Age: 70
End: 2025-01-15

## 2025-01-15 DIAGNOSIS — R74.8 ELEVATED ALKALINE PHOSPHATASE LEVEL: Primary | ICD-10-CM

## 2025-01-24 ENCOUNTER — OFFICE VISIT (OUTPATIENT)
Dept: RHEUMATOLOGY | Facility: CLINIC | Age: 70
End: 2025-01-24
Payer: COMMERCIAL

## 2025-01-24 VITALS
SYSTOLIC BLOOD PRESSURE: 173 MMHG | BODY MASS INDEX: 47 KG/M2 | DIASTOLIC BLOOD PRESSURE: 103 MMHG | HEART RATE: 82 BPM | HEIGHT: 67 IN

## 2025-01-24 DIAGNOSIS — E11.9 DIABETES MELLITUS WITHOUT COMPLICATION (HCC): ICD-10-CM

## 2025-01-24 DIAGNOSIS — Z79.899 ENCOUNTER FOR LONG-TERM (CURRENT) USE OF HIGH-RISK MEDICATION: ICD-10-CM

## 2025-01-24 DIAGNOSIS — N18.9 CHRONIC KIDNEY DISEASE, UNSPECIFIED CKD STAGE: ICD-10-CM

## 2025-01-24 DIAGNOSIS — M1A.09X0 CHRONIC GOUT OF MULTIPLE SITES, UNSPECIFIED CAUSE: Primary | ICD-10-CM

## 2025-01-24 PROCEDURE — 99214 OFFICE O/P EST MOD 30 MIN: CPT | Performed by: INTERNAL MEDICINE

## 2025-01-24 RX ORDER — ALLOPURINOL 100 MG/1
100 TABLET ORAL DAILY
Qty: 90 TABLET | Refills: 2 | Status: SHIPPED | OUTPATIENT
Start: 2025-01-24

## 2025-01-24 NOTE — PROGRESS NOTES
RHEUMATOLOGY CLINIC- FOLLOW UP    Mustapha Meyers is a 69 year old male.     ASSESSMENT/PLAN:       ICD-10-CM    1. Chronic gout of multiple sites,Non-Tophaceous  M1A.09X0 allopurinol 100 MG Oral Tab      2. Diabetes mellitus without complication (HCC)  E11.9       3. Chronic kidney disease, unspecified CKD stage  N18.9 allopurinol 100 MG Oral Tab      4. Encounter for long-term (current) use of high-risk medication  Z79.899 allopurinol 100 MG Oral Tab        DISCUSSION:  Patient presents for follow-up of longstanding, nontophaceous gout.   Goal uric acid less than 6 given non- tophaceous disease.  Recent uric acid still elevated so discussed with patient slow titration of allopurinol given CKD.  Additionally, we will continue colchicine daily unpared discussed with patient importance of daily dosing, as he was taking it every other day which may have triggered his recent flare).  Going forward, would keep in mind comorbidities including: DM and CKD.    PLAN:  - Goal uric acid less than 6 given non-tophaceous disease  -Increase allopurinol as follows: Allopurinol 10 mg daily.  Will titrate slowly pending response given CKD  - Continue colchicine 0.6 mg daily prophylaxis until goal uric acid reached  - Consult/evaluation communicated with referring physician/provider.    Patient to follow-up in about 2 months with repeat labs from prior.    Jaime Duncan DO  1/24/2025   3:05 PM      There is a longitudinal care relationship with me, the care plan reflects the ongoing nature of the continuous relationship of care, and the medical record indicates that there is ongoing treatment of a serious/complex medical condition which I am currently managing.  is Applicable.       SUBJECTIVE:      1/24/25 Follow-Up:   Patient concerned about a flare affecting his L wrist about 2 weeks, resolved with steroids by his PCP. Taking colchicine every other day. Continues allopurinol daily.     Current Medications:  Allopurinol 50  mg every day -started 9/2024 at 50 mg every day (CKD)  Colchicine 0.6 mg qod       -BID w/ GI Upset    Medication History:  Medrol Dosepak- helpful     Interval History:   9/25/2024 Initial Consult: I had the pleasure of seeing Mustapha Meyers on 9/25/2024 as a new outpatient consultation for gout. The patient was originally referred by SHENA Sánchez.     69 year old male w/ PMH Gout, allergic rhinitis DM, HLD, HTN, CKD, GERDwho presents to clinic today.  Patient has a longstanding history of gout with flares about 3-4 times a year.  Patient noting flares typically with the change of the seasons.  Last flare over a month ago but no current pain.  States he was previously on allopurinol but he is unsure why it was discontinued.  Flares can occur in his toes, feet, knees with flares typically treated with Medrol Dosepak's with good response.  He has been on colchicine daily since his last flare and was previously taking it on a as needed basis.  No unexplained fever, chills, unintentional weight loss, Raynaud's phenomenon, serositis, uveitis/iritis.      HISTORY:  Past Medical History:    Back problem    Biliary calculus    CKD (chronic kidney disease)    Diabetes (HCC)    Diabetes mellitus (HCC)    Essential hypertension    Gout    High blood pressure    History of gallstones    History of morbid obesity    Lumbar stenosis with neurogenic claudication    Osteoarthritis    Renal disorder      Social Hx Reviewed   Family Hx Reviewed     Medications (Active prior to today's visit):  Current Outpatient Medications   Medication Sig Dispense Refill    pantoprazole (PROTONIX) 40 MG Oral Tab EC Take 1 tablet (40 mg total) by mouth every morning before breakfast. 90 tablet 0    methylPREDNISolone (MEDROL) 4 MG Oral Tablet Therapy Pack As directed. 1 each 0    COLCHICINE 0.6 MG Oral Tab TAKE 1 TABLET BY MOUTH EVERY DAY 90 tablet 0    Tirzepatide (MOUNJARO) 2.5 MG/0.5ML Subcutaneous Solution Auto-injector Inject 2.5 mg  into the skin once a week. (Patient not taking: Reported on 1/11/2025) 2 mL 0    amLODIPine 5 MG Oral Tab Take 1 tablet (5 mg total) by mouth nightly. 90 tablet 0    carvedilol 25 MG Oral Tab Take 1 tablet (25 mg total) by mouth 2 (two) times daily with meals. 180 tablet 0    Irbesartan 150 MG Oral Tab Take 1 tablet (150 mg total) by mouth nightly. 90 tablet 0    allopurinol 100 MG Oral Tab Take 0.5 tablets (50 mg total) by mouth daily. 90 tablet 2    Ascorbic Acid (VITAMIN C) 1000 MG Oral Tab Take 1 tablet (1,000 mg total) by mouth daily. (Patient not taking: Reported on 1/11/2025)      Multiple Vitamins-Minerals (MULTI-VITAMIN/MINERALS) Oral Tab Take 1 tablet by mouth daily. EU naturals (Patient not taking: Reported on 1/11/2025)      NON FORMULARY Take 3 tablets by mouth daily. Blood flow 7 (Patient not taking: Reported on 1/11/2025)      Saw Palmetto, Serenoa repens, (SAW PALMETTO OR) Take by mouth daily. (Patient not taking: Reported on 1/11/2025)      Meloxicam 15 MG Oral Tab Take 1 tablet (15 mg total) by mouth daily as needed.      FREESTYLE LITE TEST In Vitro Strip TEST DAILY AS DIRECTED 100 strip 0    Blood Glucose Monitoring Suppl (FREESTYLE LITE) Does not apply Device   0    Multiple Vitamin (MULTIVITAMINS) Oral Tab Take 1 tablet by mouth daily.         Allergies:  Allergies   Allergen Reactions    Seasonal Runny nose         ROS:   Review of Systems   Constitutional:  Negative for chills and fever.   HENT:  Negative for congestion, hearing loss, mouth sores, nosebleeds and trouble swallowing.    Eyes:  Negative for photophobia, pain, redness and visual disturbance.   Respiratory:  Negative for cough and shortness of breath.    Cardiovascular:  Negative for chest pain, palpitations and leg swelling.   Gastrointestinal:  Negative for abdominal pain, blood in stool, diarrhea and nausea.   Endocrine: Negative for cold intolerance and heat intolerance.   Genitourinary:  Negative for dysuria, frequency and  hematuria.   Musculoskeletal:  Positive for arthralgias. Negative for back pain, gait problem, joint swelling, myalgias, neck pain and neck stiffness.   Skin:  Negative for color change and rash.   Neurological:  Negative for dizziness, weakness, numbness and headaches.   Psychiatric/Behavioral:  Negative for confusion and dysphoric mood.         PHYSICAL EXAM:     Constitutional:  Well developed, Well nourished, No acute distress  HENT:  Normocephalic, Atraumatic, Bilateral external ears normal, Oropharynx moist, No oral exudates.  Neck: Normal range of motion, No tenderness, Supple, No stridor.  Eyes:  PERRL, EOMI, Conjunctiva normal, No discharge.  Respiratory:  Normal breath sounds, No respiratory distress, No wheezing.  Cardiovascular:  Normal heart rate, Normal rhythm, No murmurs, No rubs, No gallops.  GI:  Bowel sounds normal, Soft, No tenderness, No masses, No pulsatile masses.  : No CVA tenderness.  Musculoskeletal:  A comprehensive 28 count joint exam was done and was negative for swelling or tenderness except as noted. Inspections for misalignment, asymmetry, crepitation, defects, tenderness, masses, nodules, effusions, range of motion, and stability in the upper and lower extremities bilaterally are all normal unless noted.           Integument:  Warm, Dry, No erythema, No rash.  Lymphatic:  No lymphadenopathy noted.  Neurologic:  Alert & oriented x 3, Normal motor function, Normal sensory function, No focal deficits noted.  Psychiatric:  Affect normal, Judgment normal, Mood normal.    LABS:     Prior lab work reviewed and notable for:     1/11/2025:  Uric acid 9 on allopurinol 50 mg daily  ESR 40 (high), CRP 1.7 (high)  TSH 1.348 WNL    CMP with BUN 18, CR 1.4 (high), AST 34 borderline, ALT 33 WNL, alk phos 154 (high), no gamma gap  CBC without cytopenias or leukocytosis    11/29/2023:  CBC without cytopenias or leukocytosis  BMP with BUN 14, CR 1.73 (high)    11/3/2023:  TSH 1.4  CMP with normal  AST/ALT, alk phos 161 (high), no gamma gap noted    2023:  Uric acid 10  ESR 25 (high), CRP 0.38 borderline high  RF less than 10 WNL    Imagin/10/2024 right hand/wrist XR:    FINDINGS:  BONES: Widening of the scapholunate interval.  No acute fracture.  First CMC joint osteoarthritis.  No suspicious bone lesion.  Ulnar minus variance.  Small cysts or intraosseous ganglia in the carpus.     OTHER: Negative.               Impression   CONCLUSION:  1. No acute finding.  2. Widened scapholunate interval suggests a scapholunate ligament tear.

## 2025-03-11 ENCOUNTER — LAB ENCOUNTER (OUTPATIENT)
Dept: LAB | Age: 70
End: 2025-03-11
Attending: FAMILY MEDICINE
Payer: COMMERCIAL

## 2025-03-11 DIAGNOSIS — E11.9 DIABETES MELLITUS WITHOUT COMPLICATION (HCC): ICD-10-CM

## 2025-03-11 DIAGNOSIS — Z00.00 LABORATORY EXAMINATION ORDERED AS PART OF A ROUTINE GENERAL MEDICAL EXAMINATION: ICD-10-CM

## 2025-03-11 DIAGNOSIS — M1A.09X0 CHRONIC GOUT OF MULTIPLE SITES, UNSPECIFIED CAUSE: ICD-10-CM

## 2025-03-11 DIAGNOSIS — Z79.899 ENCOUNTER FOR LONG-TERM (CURRENT) USE OF HIGH-RISK MEDICATION: ICD-10-CM

## 2025-03-11 LAB
ALBUMIN SERPL-MCNC: 4.6 G/DL (ref 3.2–4.8)
ALBUMIN/GLOB SERPL: 1.4 {RATIO} (ref 1–2)
ALP LIVER SERPL-CCNC: 184 U/L
ALT SERPL-CCNC: 39 U/L
ANION GAP SERPL CALC-SCNC: 7 MMOL/L (ref 0–18)
AST SERPL-CCNC: 40 U/L (ref ?–34)
BASOPHILS # BLD AUTO: 0.04 X10(3) UL (ref 0–0.2)
BASOPHILS NFR BLD AUTO: 0.6 %
BILIRUB SERPL-MCNC: 0.8 MG/DL (ref 0.2–1.1)
BILIRUB UR QL STRIP.AUTO: NEGATIVE
BUN BLD-MCNC: 22 MG/DL (ref 9–23)
CALCIUM BLD-MCNC: 9.9 MG/DL (ref 8.7–10.6)
CHLORIDE SERPL-SCNC: 105 MMOL/L (ref 98–112)
CLARITY UR REFRACT.AUTO: CLEAR
CO2 SERPL-SCNC: 33 MMOL/L (ref 21–32)
CREAT BLD-MCNC: 1.98 MG/DL
CREAT UR-SCNC: 154.1 MG/DL
CRP SERPL-MCNC: <0.4 MG/DL (ref ?–0.5)
EGFRCR SERPLBLD CKD-EPI 2021: 36 ML/MIN/1.73M2 (ref 60–?)
EOSINOPHIL # BLD AUTO: 0.14 X10(3) UL (ref 0–0.7)
EOSINOPHIL NFR BLD AUTO: 2.1 %
ERYTHROCYTE [DISTWIDTH] IN BLOOD BY AUTOMATED COUNT: 14.6 %
EST. AVERAGE GLUCOSE BLD GHB EST-MCNC: 146 MG/DL (ref 68–126)
FASTING STATUS PATIENT QL REPORTED: YES
GLOBULIN PLAS-MCNC: 3.2 G/DL (ref 2–3.5)
GLUCOSE BLD-MCNC: 123 MG/DL (ref 70–99)
GLUCOSE UR STRIP.AUTO-MCNC: NORMAL MG/DL
HBA1C MFR BLD: 6.7 % (ref ?–5.7)
HCT VFR BLD AUTO: 46.6 %
HGB BLD-MCNC: 14.7 G/DL
IMM GRANULOCYTES # BLD AUTO: 0.02 X10(3) UL (ref 0–1)
IMM GRANULOCYTES NFR BLD: 0.3 %
KETONES UR STRIP.AUTO-MCNC: NEGATIVE MG/DL
LEUKOCYTE ESTERASE UR QL STRIP.AUTO: NEGATIVE
LYMPHOCYTES # BLD AUTO: 2.06 X10(3) UL (ref 1–4)
LYMPHOCYTES NFR BLD AUTO: 31.2 %
MCH RBC QN AUTO: 30.2 PG (ref 26–34)
MCHC RBC AUTO-ENTMCNC: 31.5 G/DL (ref 31–37)
MCV RBC AUTO: 95.7 FL
MICROALBUMIN UR-MCNC: 53.6 MG/DL
MICROALBUMIN/CREAT 24H UR-RTO: 347.8 UG/MG (ref ?–30)
MONOCYTES # BLD AUTO: 0.6 X10(3) UL (ref 0.1–1)
MONOCYTES NFR BLD AUTO: 9.1 %
NEUTROPHILS # BLD AUTO: 3.75 X10 (3) UL (ref 1.5–7.7)
NEUTROPHILS # BLD AUTO: 3.75 X10(3) UL (ref 1.5–7.7)
NEUTROPHILS NFR BLD AUTO: 56.7 %
NITRITE UR QL STRIP.AUTO: NEGATIVE
OSMOLALITY SERPL CALC.SUM OF ELEC: 305 MOSM/KG (ref 275–295)
PH UR STRIP.AUTO: 6 [PH] (ref 5–8)
PLATELET # BLD AUTO: 209 10(3)UL (ref 150–450)
POTASSIUM SERPL-SCNC: 4.9 MMOL/L (ref 3.5–5.1)
PROT SERPL-MCNC: 7.8 G/DL (ref 5.7–8.2)
PROT UR STRIP.AUTO-MCNC: 70 MG/DL
RBC # BLD AUTO: 4.87 X10(6)UL
RBC UR QL AUTO: NEGATIVE
SODIUM SERPL-SCNC: 145 MMOL/L (ref 136–145)
SP GR UR STRIP.AUTO: 1.01 (ref 1–1.03)
UROBILINOGEN UR STRIP.AUTO-MCNC: NORMAL MG/DL
WBC # BLD AUTO: 6.6 X10(3) UL (ref 4–11)

## 2025-03-11 PROCEDURE — 87086 URINE CULTURE/COLONY COUNT: CPT

## 2025-03-11 PROCEDURE — 82570 ASSAY OF URINE CREATININE: CPT

## 2025-03-11 PROCEDURE — 85652 RBC SED RATE AUTOMATED: CPT

## 2025-03-11 PROCEDURE — 85025 COMPLETE CBC W/AUTO DIFF WBC: CPT

## 2025-03-11 PROCEDURE — 80053 COMPREHEN METABOLIC PANEL: CPT

## 2025-03-11 PROCEDURE — 83036 HEMOGLOBIN GLYCOSYLATED A1C: CPT

## 2025-03-11 PROCEDURE — 86140 C-REACTIVE PROTEIN: CPT

## 2025-03-11 PROCEDURE — 82043 UR ALBUMIN QUANTITATIVE: CPT

## 2025-03-11 PROCEDURE — 36415 COLL VENOUS BLD VENIPUNCTURE: CPT

## 2025-03-11 PROCEDURE — 81001 URINALYSIS AUTO W/SCOPE: CPT

## 2025-03-12 LAB — ERYTHROCYTE [SEDIMENTATION RATE] IN BLOOD: 30 MM/HR

## 2025-03-13 ENCOUNTER — LAB ENCOUNTER (OUTPATIENT)
Dept: LAB | Age: 70
End: 2025-03-13
Attending: FAMILY MEDICINE
Payer: COMMERCIAL

## 2025-03-13 ENCOUNTER — OFFICE VISIT (OUTPATIENT)
Dept: INTERNAL MEDICINE CLINIC | Facility: CLINIC | Age: 70
End: 2025-03-13
Payer: COMMERCIAL

## 2025-03-13 VITALS
DIASTOLIC BLOOD PRESSURE: 78 MMHG | HEIGHT: 67 IN | BODY MASS INDEX: 45.83 KG/M2 | RESPIRATION RATE: 18 BRPM | SYSTOLIC BLOOD PRESSURE: 138 MMHG | HEART RATE: 76 BPM | WEIGHT: 292 LBS | OXYGEN SATURATION: 98 %

## 2025-03-13 DIAGNOSIS — R74.8 ELEVATED ALKALINE PHOSPHATASE LEVEL: ICD-10-CM

## 2025-03-13 DIAGNOSIS — N18.32 STAGE 3B CHRONIC KIDNEY DISEASE (HCC): ICD-10-CM

## 2025-03-13 DIAGNOSIS — I10 ESSENTIAL HYPERTENSION: Primary | ICD-10-CM

## 2025-03-13 DIAGNOSIS — L30.9 DERMATITIS: ICD-10-CM

## 2025-03-13 PROCEDURE — 99214 OFFICE O/P EST MOD 30 MIN: CPT | Performed by: FAMILY MEDICINE

## 2025-03-13 PROCEDURE — 36415 COLL VENOUS BLD VENIPUNCTURE: CPT

## 2025-03-13 PROCEDURE — 84080 ASSAY ALKALINE PHOSPHATASES: CPT

## 2025-03-13 PROCEDURE — 84075 ASSAY ALKALINE PHOSPHATASE: CPT

## 2025-03-13 RX ORDER — TRIAMCINOLONE ACETONIDE 1 MG/G
1 CREAM TOPICAL 3 TIMES DAILY
Qty: 45 G | Refills: 0 | Status: SHIPPED | OUTPATIENT
Start: 2025-03-13

## 2025-03-13 NOTE — PROGRESS NOTES
CHIEF COMPLAINT:     Chief Complaint   Patient presents with    Lab Results       HPI:   Mustapha Meyers is a 69 year old male .  Patient presents for recheck of their hypertension. Pt has been taking medications as instructed but was in a rush to get here today, no medication side effects, home BP monitoring has not been done.  Pt here to go over his lab results. Labs discussed with Pt. Pt will get an Alk. Phos Isoenzyme done and Pt will follow up with Nephrology per Dr. Helms's recommendation.  Currently asymptomatic, no chest pains, jaw pains, arm pains. No headaches, dizziness or edema.  No SOB on exertion or rest.  Pt has been trying to  follow a low fat diet and has been exercising- walking some..     Pt also c/o itchy left upper back/shoulder area and left axilla area. Pt does not see a rash just some redness. Pt would to know what to put on it.    Pt has seen his hand specialist Dr. Rodríguez at [place].Dr. Rodríguez feels it is the gout causes most of the issues. Pt will be doing PT/OT for his hands    Mustapha Meyers is a 69 year old male who presents for recheck of his diabetes. Pt has not been on any medications for his diabetes. Pt got the Mounjaro but never started it. Now that his son is living with him for the next 6 months he will start it. Pt was afraid something would happen to him on it  and no one would know.  Last Hgba1c: 6.7 on 3/11/25  Urine Microalbumin: done on 3/11/25 is up -347.8  Last visit with ophthalmologist was within the last 6 months.Pt not sure of the date. Pt signed release to get the records.    Pt has been checking his feet on a regular basis. Pt denies any tingling of the feet.       Wt Readings from Last 6 Encounters:   03/13/25 292 lb (132.5 kg)   01/11/25 (!) 301 lb (136.5 kg)   12/30/24 (!) 300 lb 3.2 oz (136.2 kg)   09/25/24 292 lb (132.5 kg)   09/12/24 290 lb 6.4 oz (131.7 kg)   07/08/24 298 lb (135.2 kg)     Body mass index is 45.73 kg/m².     HEMOGLOBIN A1C (%)   Date Value    04/01/2021 7.6 (A)   04/09/2019 7.8 (A)     HEMOGLOBIN A1c (% of total Hgb)   Date Value   10/30/2013 6.1 (H)   06/18/2013 5.9 (H)   06/18/2013 5.8 (H)     HgbA1C (%)   Date Value   03/11/2025 6.7 (H)   01/11/2025 6.5 (H)   11/03/2023 6.9 (H)     Cholesterol, Total (mg/dL)   Date Value   01/11/2025 159   11/03/2023 177   07/05/2022 183     CHOLESTEROL, TOTAL (mg/dL)   Date Value   06/18/2013 175   10/01/2012 184   01/09/2012 150     HDL Cholesterol (mg/dL)   Date Value   01/11/2025 36 (L)   11/03/2023 47   07/05/2022 47     HDL CHOLESTEROL (mg/dL)   Date Value   06/18/2013 43   10/01/2012 41   01/09/2012 44     LDL Cholesterol (mg/dL)   Date Value   01/11/2025 99   11/03/2023 105 (H)   07/05/2022 111 (H)     LDL-CHOLESTEROL (mg/dL (calc))   Date Value   06/18/2013 107   10/01/2012 111   01/09/2012 89     AST (U/L)   Date Value   03/11/2025 40 (H)   01/11/2025 34 (H)   11/03/2023 31   10/30/2013 36 (H)   06/18/2013 22   10/01/2012 22     ALT (U/L)   Date Value   03/11/2025 39   01/11/2025 33   11/03/2023 53   10/30/2013 54 (H)   06/18/2013 27   10/01/2012 30      Malb/Cre Calc   Date Value Ref Range Status   03/11/2025 347.8 (H) <=30.0 ug/mg Final     Comment:     <30 ug/mg creatinine       Normal     ug/mg creatinine   Microalbuminuria   >300 ug/mg creatinine      Albuminuria       01/11/2025 539.3 (H) <=30.0 ug/mg Final     Comment:     <30 ug/mg creatinine       Normal     ug/mg creatinine   Microalbuminuria   >300 ug/mg creatinine      Albuminuria       11/03/2023 295.6 (H) <=30.0 ug/mg Final     Comment:     <30 ug/mg creatinine       Normal     ug/mg creatinine   Microalbuminuria   >300 ug/mg creatinine      Albuminuria           Current Outpatient Medications   Medication Sig Dispense Refill    triamcinolone 0.1 % External Cream Apply 1 Application topically 3 (three) times daily. 45 g 0    allopurinol 100 MG Oral Tab Take 1 tablet (100 mg total) by mouth daily. 90 tablet 2    COLCHICINE  0.6 MG Oral Tab TAKE 1 TABLET BY MOUTH EVERY DAY 90 tablet 0    amLODIPine 5 MG Oral Tab Take 1 tablet (5 mg total) by mouth nightly. 90 tablet 0    carvedilol 25 MG Oral Tab Take 1 tablet (25 mg total) by mouth 2 (two) times daily with meals. 180 tablet 0    Irbesartan 150 MG Oral Tab Take 1 tablet (150 mg total) by mouth nightly. 90 tablet 0    Multiple Vitamin (MULTIVITAMINS) Oral Tab Take 1 tablet by mouth daily.      Meloxicam 15 MG Oral Tab Take 1 tablet (15 mg total) by mouth daily as needed. (Patient not taking: Reported on 3/13/2025)      FREESTYLE LITE TEST In Vitro Strip TEST DAILY AS DIRECTED (Patient not taking: Reported on 3/13/2025) 100 strip 0    Blood Glucose Monitoring Suppl (FREESTYLE LITE) Does not apply Device  (Patient not taking: Reported on 3/13/2025)  0      Past Medical History:    Back problem    Biliary calculus    CKD (chronic kidney disease)    Diabetes (HCC)    Diabetes mellitus (HCC)    Essential hypertension    Gout    High blood pressure    History of gallstones    History of morbid obesity    Lumbar stenosis with neurogenic claudication    Osteoarthritis    Renal disorder      Social History:  Social History     Socioeconomic History    Marital status: Single   Tobacco Use    Smoking status: Never    Smokeless tobacco: Never   Vaping Use    Vaping status: Never Used   Substance and Sexual Activity    Alcohol use: No    Drug use: No   Other Topics Concern    Caffeine Concern Yes     Comment: 1 cup of coffee daily and 1-2 cans of Pepsi daily.     Exercise Yes     Comment: 1-2 days/week    Pt has a pacemaker No    Reaction to local anesthetic No   Social History Narrative    Live alone    UA ramp reza    6 grandchildren        No tob - LLNS    No etoh        No pets     Social Drivers of Health      [facility]    Housing Stability        REVIEW OF SYSTEMS:   GENERAL HEALTH: feels well otherwise. No fever, chills, or  malaise.   SKIN: see HPI  HEENT: denies ear pain, congestion, or sore throat  CARDIOVASCULAR: denies chest pain or palpitations  RESPIRATORY: denies shortness of breath, cough or wheezing  GI: Negative,  Prior colonoscopy DUE,   NEURO: denies headaches, loss of bowel or bladder control    EXAM:   /78   Pulse 76   Resp 18   Ht 5' 7\" (1.702 m)   Wt 292 lb (132.5 kg)   SpO2 98%   BMI 45.73 kg/m²   GENERAL: well developed, well nourished  SKIN:left upper back, shoulder and axilla- mild dry skin noted,some redness from itching noted , no rash noted  HEAD: atraumatic, normocephalic,   EYES: conjunctiva clear  NECK: supple,no adenopathy  LUNGS: clear to auscultation bilaterally. No wheezing, rales, or rhonchi.    CARDIO: RRR without murmur  EXTREMITIES: no cyanosis, clubbing or edema      ASSESSMENT AND PLAN:     Encounter Diagnoses   Name Primary?    Elevated alkaline phosphatase level     Dermatitis     Essential hypertension Yes    Stage 3b chronic kidney disease (HCC)        Orders Placed This Encounter   Procedures    Alk Phos Isoenzyme [E]       Meds & Refills for this Visit:  Requested Prescriptions     Signed Prescriptions Disp Refills    triamcinolone 0.1 % External Cream 45 g 0     Sig: Apply 1 Application topically 3 (three) times daily.       Imaging & Consults:  None    PLAN:  1. Elevated alkaline phosphatase level  Check lab  - Alk Phos Isoenzyme [E]; Future    2. Dermatitis  - apply steroid cream to the affected areas 2-3 times a day as needed  - triamcinolone 0.1 % External Cream; Apply 1 Application topically 3 (three) times daily.  Dispense: 45 g; Refill: 0    3. Essential hypertension  Conservative measures dicussed. Continue medication. Pt to take it daily and no skip doses!  Diet and exercise explained and encouraged.  Home blood pressure monitoring. Pt should measure BP’s two to three times per week. Goal blood pressure at home - < 135/85.       4. Stage 3b chronic kidney disease (HCC)  -  Pt referred to Nephrology due to labs,diabetes.HTN    The patient indicates understanding of these issues and agrees to the plan.  The patient is asked to return with in 1-3 months for his PX.    I spent 30 minutes preparing to see the patient,reviewing patient's chart,results,history,medical decision making,placing orders,counseling/coordinating care and documenting in the chart.

## 2025-03-14 ENCOUNTER — TELEPHONE (OUTPATIENT)
Dept: INTERNAL MEDICINE CLINIC | Facility: CLINIC | Age: 70
End: 2025-03-14

## 2025-03-18 LAB
ALK PHOSPHATASE: 205 IU/L
BONE FRAC: 29 %
INTESTINAL FRAC: 29 %
LIVER FRAC: 42 %

## 2025-03-25 ENCOUNTER — OFFICE VISIT (OUTPATIENT)
Dept: INTERNAL MEDICINE CLINIC | Facility: CLINIC | Age: 70
End: 2025-03-25
Payer: COMMERCIAL

## 2025-03-25 ENCOUNTER — TELEPHONE (OUTPATIENT)
Dept: INTERNAL MEDICINE CLINIC | Facility: CLINIC | Age: 70
End: 2025-03-25

## 2025-03-25 VITALS
BODY MASS INDEX: 45.83 KG/M2 | DIASTOLIC BLOOD PRESSURE: 90 MMHG | OXYGEN SATURATION: 97 % | HEART RATE: 70 BPM | RESPIRATION RATE: 18 BRPM | HEIGHT: 67 IN | SYSTOLIC BLOOD PRESSURE: 138 MMHG | WEIGHT: 292 LBS

## 2025-03-25 DIAGNOSIS — A08.4 STOMACH FLU: Primary | ICD-10-CM

## 2025-03-25 DIAGNOSIS — R74.8 ELEVATED ALKALINE PHOSPHATASE LEVEL: ICD-10-CM

## 2025-03-25 PROCEDURE — 99214 OFFICE O/P EST MOD 30 MIN: CPT | Performed by: FAMILY MEDICINE

## 2025-03-25 NOTE — PROGRESS NOTES
CHIEF COMPLAINT:     Chief Complaint   Patient presents with    Lab Results    Stomach Pain     Diarrhea, of and on over last few days.        HPI:     Mustapha Meyers is a 70 year old male who presents for complaints of diarrhea for the last few days.  Symptoms have been present for 4  days.   2 bowel movements in last 24 hours.BM's are improving. They are more soft now.    Symptoms are worsened by eating certain foods. Pt had some peanuts and pizza last night and it did not agree with him. Discussed the BRAT/[place] diet with Pt.. Prior abdomial pain hx: none.  Associated symptoms: Has GERD symptoms if drinks coffee.  Appetite is slightly diminished.  Has been drinking fluids with no issues. Denies N&V.  Denies moderate to severe abdominal pain,just some cramping  ADDITIONAL GI SX's: Denies burning in the chest, fullness, belching, dysphagia, odynaphagia. Pt denies: dark stools, occ bloody stools, black stools.     Lab results- Alk Phos Isoenzyme was discussed with Pt. Pt has an appt with GI on 4/11/25 for consult for colonoscopy.Pt also getting his abdominal ultrasound done on 4/8/25.   Current Outpatient Medications   Medication Sig Dispense Refill    triamcinolone 0.1 % External Cream Apply 1 Application topically 3 (three) times daily. 45 g 0    allopurinol 100 MG Oral Tab Take 1 tablet (100 mg total) by mouth daily. 90 tablet 2    COLCHICINE 0.6 MG Oral Tab TAKE 1 TABLET BY MOUTH EVERY DAY 90 tablet 0    amLODIPine 5 MG Oral Tab Take 1 tablet (5 mg total) by mouth nightly. 90 tablet 0    carvedilol 25 MG Oral Tab Take 1 tablet (25 mg total) by mouth 2 (two) times daily with meals. 180 tablet 0    Irbesartan 150 MG Oral Tab Take 1 tablet (150 mg total) by mouth nightly. 90 tablet 0    Meloxicam 15 MG Oral Tab Take 1 tablet (15 mg total) by mouth daily as needed.      FREESTYLE LITE TEST In Vitro Strip TEST DAILY AS DIRECTED 100 strip 0    Blood Glucose Monitoring Suppl (FREESTYLE LITE) Does not apply Device    0    Multiple Vitamin (MULTIVITAMINS) Oral Tab Take 1 tablet by mouth daily.        Past Medical History:    Back problem    Biliary calculus    CKD (chronic kidney disease)    Diabetes (HCC)    Diabetes mellitus (HCC)    Essential hypertension    Gout    High blood pressure    History of gallstones    History of morbid obesity    Lumbar stenosis with neurogenic claudication    Osteoarthritis    Renal disorder      Social History:  Social History     Socioeconomic History    Marital status: Single   Tobacco Use    Smoking status: Never    Smokeless tobacco: Never   Vaping Use    Vaping status: Never Used   Substance and Sexual Activity    Alcohol use: No    Drug use: No   Other Topics Concern    Caffeine Concern Yes     Comment: 1 cup of coffee daily and 1-2 cans of Pepsi daily.     Exercise Yes     Comment: 1-2 days/week    Pt has a pacemaker No    Reaction to local anesthetic No   Social History Narrative    Live alone    UA ramp reza    6 grandchildren        No tob - LLNS    No etoh        No pets     Social Drivers of Health      Received from [facility], [facility]    Housing Stability        REVIEW OF SYSTEMS:   GENERAL: Denies fever, chills,weight change, decreased appetite  SKIN: Denies rashes, skin wounds or ulcers.  EYES: Denies blurred vision or double vision  HENT: Denies congestion, rhinorrhea, sore throat or ear pain  CHEST: Denies chest pain, or palpitations  LUNGS: Denies shortness of breath, cough, or wheezing  GI: Denies abdominal pain, N/V/C/D.   MUSCULOSKELETAL: no arthralgia or swollen joints  LYMPH:  Denies lymphadenopathy  NEURO: Denies headaches or lightheadedness      EXAM:   /90 (BP Location: Left arm, Patient Position: Sitting, Cuff Size: large)   Pulse 70   Resp 18   Ht 5' 7\" (1.702 m)   Wt 292 lb (132.5 kg)   SpO2 97%   BMI 45.73 kg/m²   GENERAL: well developed, well nourished,in no apparent distress  HEAD: atraumatic,  normocephalic  EYES: conjunctiva clear, EOM intact, PERRLA  NECK: supple, non-tender  LUNGS: clear to auscultation bilaterally, no wheezes or rhonchi. Breathing is non labored.  CARDIO: RRR without murmur  GI:  No palpable masses or hepatosplenomegaly.  no tenderness on palpation  EXTREMITIES: no cyanosis, clubbing or edema    Physical Exam    ASSESSMENT AND PLAN:     Encounter Diagnoses   Name Primary?    Stomach flu Yes    Elevated alkaline phosphatase level        No orders of the defined types were placed in this encounter.      Meds & Refills for this Visit:  Requested Prescriptions      No prescriptions requested or ordered in this encounter       Imaging & Consults:  None    PLAN:  1. Stomach flu  Rest.  Drink lots of fluids.  Avoid dairy products while you have the diarrhea.  Avoid roughage like fresh crunchy fruits and vegetables until you start having normal stools.  Start with bland foods like bananas, rice,applesauce, toast, crackers.(BRAT DIET)  Advance your diet as tolerated.   Imodium as needed.  Note given for his job.      2. Elevated alkaline phosphatase level  - lab result discussed. Await abdominal ultrasound results  - GI consult.    The patient indicates understanding of these issues and agrees to the plan.  The patient is asked to call with concerns.      I spent 30 minutes preparing to see the patient,reviewing patient's chart,results,history,medical decision making,placing orders,counseling/coordinating care and documenting in the chart.

## 2025-03-26 ENCOUNTER — OFFICE VISIT (OUTPATIENT)
Age: 70
End: 2025-03-26
Payer: COMMERCIAL

## 2025-03-26 VITALS
WEIGHT: 295 LBS | HEIGHT: 67 IN | SYSTOLIC BLOOD PRESSURE: 140 MMHG | HEART RATE: 63 BPM | DIASTOLIC BLOOD PRESSURE: 80 MMHG | BODY MASS INDEX: 46.3 KG/M2

## 2025-03-26 DIAGNOSIS — M1A.09X0 CHRONIC GOUT OF MULTIPLE SITES, UNSPECIFIED CAUSE: Primary | ICD-10-CM

## 2025-03-26 DIAGNOSIS — M10.9 GOUTY ARTHROPATHY: ICD-10-CM

## 2025-03-26 DIAGNOSIS — Z79.899 ENCOUNTER FOR LONG-TERM (CURRENT) USE OF HIGH-RISK MEDICATION: ICD-10-CM

## 2025-03-26 DIAGNOSIS — E11.9 DIABETES MELLITUS WITHOUT COMPLICATION (HCC): ICD-10-CM

## 2025-03-26 DIAGNOSIS — N18.9 CHRONIC KIDNEY DISEASE, UNSPECIFIED CKD STAGE: ICD-10-CM

## 2025-03-26 PROCEDURE — 99214 OFFICE O/P EST MOD 30 MIN: CPT | Performed by: INTERNAL MEDICINE

## 2025-03-26 RX ORDER — PREDNISONE 5 MG/1
5 TABLET ORAL
Qty: 90 TABLET | Refills: 2 | Status: SHIPPED | OUTPATIENT
Start: 2025-03-26

## 2025-03-26 RX ORDER — ALLOPURINOL 100 MG/1
150 TABLET ORAL DAILY
Qty: 90 TABLET | Refills: 2 | Status: SHIPPED | OUTPATIENT
Start: 2025-03-26

## 2025-03-26 NOTE — PROGRESS NOTES
RHEUMATOLOGY CLINIC- FOLLOW UP    Mustapha Meyers is a 70 year old male.     ASSESSMENT/PLAN:       ICD-10-CM    1. Chronic gout of multiple sites,Non-Tophaceous  M1A.09X0 Uric Acid     ALT(SGPT)     AST (SGOT)     CBC With Differential With Platelet     Creatinine, Serum     allopurinol 100 MG Oral Tab      2. Diabetes mellitus without complication (HCC)  E11.9 Uric Acid     ALT(SGPT)     AST (SGOT)     CBC With Differential With Platelet     Creatinine, Serum      3. Chronic kidney disease, unspecified CKD stage  N18.9 Uric Acid     ALT(SGPT)     AST (SGOT)     CBC With Differential With Platelet     Creatinine, Serum     allopurinol 100 MG Oral Tab      4. Encounter for long-term (current) use of high-risk medication  Z79.899 Uric Acid     ALT(SGPT)     AST (SGOT)     CBC With Differential With Platelet     Creatinine, Serum     allopurinol 100 MG Oral Tab      5. Gouty arthropathy  M10.9           DISCUSSION:  Patient presents for follow-up of longstanding, nontophaceous gout.   Goal uric acid less than 6 given non- tophaceous disease.  Recent labs without uric acid but I suspect it is likely greater than 6, as last level was still around 9 on allopurinol 50 mg daily.  No flares since his last appointment.  Discussed with patient though would not continue colchicine given carvedilol treatment in the setting of his kidney function given risk of side effects.  Therefore, discussed with patient risk/benefits of modifying to low-dose prednisone NSAID to which she is agreeable.    Will continue slow titration of allopurinol given CKD.  Additionally, we will modify therapy from colchicine to low-dose prednisone.  Going forward, would keep in mind comorbidities including: DM, carvedilol treatment, and CKD.    PLAN:  - Goal uric acid less than 6 given non-tophaceous disease  -Increase allopurinol as follows: Allopurinol 150 mg daily.  Will titrate slowly pending response given CKD  - Modify to prednisone 5 mg daily as  prophylaxis until goal uric acid reached.  Would avoid colchicine in the setting of carvedilol and CKD  - Consult/evaluation communicated with referring physician/provider.    Patient will have labs drawn in 1 month and I will MyChart him regarding further titration of medications.  Otherwise, patient to follow-up in 2 months with repeat labs from prior.    Jaime LangfordfaymahamedDO  3/26/2025   12:21 PM      There is a longitudinal care relationship with me, the care plan reflects the ongoing nature of the continuous relationship of care, and the medical record indicates that there is ongoing treatment of a serious/complex medical condition which I am currently managing.  is Applicable.       SUBJECTIVE:      3/26/25 Follow-Up: Patient feeling well since his last appointment and tolerating allopurinol with background colchicine without issue.  No flares since his last appointment.      Current Medications:  Allopurinol 100 mg every day -started 9/2024 at 50 mg every day (CKD)  Colchicine 0.6 mg qod       -BID w/ GI Upset    Medication History:  Medrol Dosepak- helpful     Interval History:   9/25/2024 Initial Consult: I had the pleasure of seeing Mustapha Meyers on 9/25/2024 as a new outpatient consultation for gout. The patient was originally referred by SHENA Sánchez.     69 year old male w/ PMH Gout, allergic rhinitis DM, HLD, HTN, CKD, GERD who presents to clinic today.  Patient has a longstanding history of gout with flares about 3-4 times a year.  Patient noting flares typically with the change of the seasons.  Last flare over a month ago but no current pain.  States he was previously on allopurinol but he is unsure why it was discontinued.  Flares can occur in his toes, feet, knees with flares typically treated with Medrol Dosepak's with good response.  He has been on colchicine daily since his last flare and was previously taking it on a as needed basis.  No unexplained fever, chills, unintentional weight  loss, Raynaud's phenomenon, serositis, uveitis/iritis.      1/24/25 Follow-Up:   Patient concerned about a flare affecting his L wrist about 2 weeks, resolved with steroids by his PCP. Taking colchicine every other day. Continues allopurinol daily.     HISTORY:  Past Medical History:    Back problem    Biliary calculus    CKD (chronic kidney disease)    Diabetes (HCC)    Diabetes mellitus (HCC)    Essential hypertension    Gout    High blood pressure    History of gallstones    History of morbid obesity    Lumbar stenosis with neurogenic claudication    Osteoarthritis    Renal disorder      Social Hx Reviewed   Family Hx Reviewed     Medications (Active prior to today's visit):  Current Outpatient Medications   Medication Sig Dispense Refill    triamcinolone 0.1 % External Cream Apply 1 Application topically 3 (three) times daily. 45 g 0    allopurinol 100 MG Oral Tab Take 1 tablet (100 mg total) by mouth daily. 90 tablet 2    COLCHICINE 0.6 MG Oral Tab TAKE 1 TABLET BY MOUTH EVERY DAY 90 tablet 0    amLODIPine 5 MG Oral Tab Take 1 tablet (5 mg total) by mouth nightly. 90 tablet 0    carvedilol 25 MG Oral Tab Take 1 tablet (25 mg total) by mouth 2 (two) times daily with meals. 180 tablet 0    Irbesartan 150 MG Oral Tab Take 1 tablet (150 mg total) by mouth nightly. 90 tablet 0    Meloxicam 15 MG Oral Tab Take 1 tablet (15 mg total) by mouth daily as needed.      Multiple Vitamin (MULTIVITAMINS) Oral Tab Take 1 tablet by mouth daily.      FREESTYLE LITE TEST In Vitro Strip TEST DAILY AS DIRECTED 100 strip 0    Blood Glucose Monitoring Suppl (FREESTYLE LITE) Does not apply Device   0       Allergies:  Allergies   Allergen Reactions    Seasonal Runny nose         ROS:   Review of Systems   Constitutional:  Negative for chills and fever.   HENT:  Negative for congestion, hearing loss, mouth sores, nosebleeds and trouble swallowing.    Eyes:  Negative for photophobia, pain, redness and visual disturbance.   Respiratory:   Negative for cough and shortness of breath.    Cardiovascular:  Negative for chest pain, palpitations and leg swelling.   Gastrointestinal:  Negative for abdominal pain, blood in stool, diarrhea and nausea.   Endocrine: Negative for cold intolerance and heat intolerance.   Genitourinary:  Negative for dysuria, frequency and hematuria.   Musculoskeletal:  Positive for arthralgias. Negative for back pain, gait problem, joint swelling, myalgias, neck pain and neck stiffness.   Skin:  Negative for color change and rash.   Neurological:  Negative for dizziness, weakness, numbness and headaches.   Psychiatric/Behavioral:  Negative for confusion and dysphoric mood.         PHYSICAL EXAM:     Constitutional:  Well developed, Well nourished, No acute distress  HENT:  Normocephalic, Atraumatic, Bilateral external ears normal, Oropharynx moist, No oral exudates.  Neck: Normal range of motion, No tenderness, Supple, No stridor.  Eyes:  PERRL, EOMI, Conjunctiva normal, No discharge.  Respiratory:  Normal breath sounds, No respiratory distress, No wheezing.  Cardiovascular:  Normal heart rate, Normal rhythm, No murmurs, No rubs, No gallops.  GI:  Bowel sounds normal, Soft, No tenderness, No masses, No pulsatile masses.  : No CVA tenderness.  Musculoskeletal:  A comprehensive 28 count joint exam was done and was negative for swelling or tenderness except as noted. Inspections for misalignment, asymmetry, crepitation, defects, tenderness, masses, nodules, effusions, range of motion, and stability in the upper and lower extremities bilaterally are all normal unless noted.           Integument:  Warm, Dry, No erythema, No rash.  Lymphatic:  No lymphadenopathy noted.  Neurologic:  Alert & oriented x 3, Normal motor function, Normal sensory function, No focal deficits noted.  Psychiatric:  Affect normal, Judgment normal, Mood normal.    LABS:     Prior lab work reviewed and notable for:     3/11/2025:  CMP with BUN 22, CR 1.98  (high), AST 40 (high), ALT WNL, alk phos 184 (high), no gamma gap  CBC without cytopenias or leukocytosis  CRP less than 0.4 WNL, ESR 30 (normal when corrected for age/gender)    2025:  Uric acid 9 on allopurinol 50 mg daily  ESR 40 (high), CRP 1.7 (high)  TSH 1.348 WNL    CMP with BUN 18, CR 1.4 (high), AST 34 borderline, ALT 33 WNL, alk phos 154 (high), no gamma gap  CBC without cytopenias or leukocytosis    2023:  CBC without cytopenias or leukocytosis  BMP with BUN 14, CR 1.73 (high)    11/3/2023:  TSH 1.4  CMP with normal AST/ALT, alk phos 161 (high), no gamma gap noted    2023:  Uric acid 10  ESR 25 (high), CRP 0.38 borderline high  RF less than 10 WNL    Imagin/10/2024 right hand/wrist XR:    FINDINGS:  BONES: Widening of the scapholunate interval.  No acute fracture.  First CMC joint osteoarthritis.  No suspicious bone lesion.  Ulnar minus variance.  Small cysts or intraosseous ganglia in the carpus.     OTHER: Negative.               Impression   CONCLUSION:  1. No acute finding.  2. Widened scapholunate interval suggests a scapholunate ligament tear.

## 2025-04-08 ENCOUNTER — HOSPITAL ENCOUNTER (OUTPATIENT)
Dept: ULTRASOUND IMAGING | Age: 70
Discharge: HOME | End: 2025-04-08
Attending: FAMILY MEDICINE
Payer: COMMERCIAL

## 2025-04-08 DIAGNOSIS — R74.8 ELEVATED ALKALINE PHOSPHATASE LEVEL: ICD-10-CM

## 2025-04-08 PROCEDURE — 76700 US EXAM ABDOM COMPLETE: CPT | Performed by: FAMILY MEDICINE

## 2025-04-14 ENCOUNTER — TELEPHONE (OUTPATIENT)
Dept: INTERNAL MEDICINE CLINIC | Facility: CLINIC | Age: 70
End: 2025-04-14

## 2025-04-14 NOTE — TELEPHONE ENCOUNTER
Spoke with patient regarding results and recommendations per TO and SM. Patient verbalized understanding, will keep 5/1/2025 appointment with GI

## 2025-04-30 DIAGNOSIS — M10.9 GOUTY ARTHROPATHY: ICD-10-CM

## 2025-04-30 RX ORDER — METHYLPREDNISOLONE 4 MG/1
TABLET ORAL
Qty: 21 EACH | Refills: 0 | OUTPATIENT
Start: 2025-04-30

## 2025-05-02 RX ORDER — PREDNISONE 5 MG/1
TABLET ORAL
Qty: 60 TABLET | Refills: 0 | Status: SHIPPED | OUTPATIENT
Start: 2025-05-02 | End: 2025-06-13

## 2025-05-12 ENCOUNTER — TELEMEDICINE (OUTPATIENT)
Dept: INTERNAL MEDICINE CLINIC | Facility: CLINIC | Age: 70
End: 2025-05-12
Payer: COMMERCIAL

## 2025-05-12 DIAGNOSIS — M54.41 ACUTE BILATERAL LOW BACK PAIN WITH BILATERAL SCIATICA: Primary | ICD-10-CM

## 2025-05-12 DIAGNOSIS — M54.42 ACUTE BILATERAL LOW BACK PAIN WITH BILATERAL SCIATICA: Primary | ICD-10-CM

## 2025-05-12 RX ORDER — CYCLOBENZAPRINE HCL 10 MG
10 TABLET ORAL 3 TIMES DAILY PRN
Qty: 30 TABLET | Refills: 0 | Status: SHIPPED | OUTPATIENT
Start: 2025-05-12 | End: 2025-06-01

## 2025-05-12 RX ORDER — PREDNISONE 10 MG/1
TABLET ORAL
Qty: 20 TABLET | Refills: 0 | Status: SHIPPED | OUTPATIENT
Start: 2025-05-12

## 2025-05-12 NOTE — PROGRESS NOTES
Virtual Video Check-In     This visit is conducted using Telemedicine with live, interactive video and audio.    Mustapha Meyers, who has verified his/her identification by name and , verbally consents to a Virtual/Telephone Check-In visit on 25.  Patient confirms that he/she is in the [facility] at the time of service.  Patient understands and accepts financial responsibility for any deductible, co-insurance and/or co-pays associated with this service.    TIME: 20 minutes      HPI: Pt c/o back pain since late Saturday night. Pt states he fell asleep in his SUV while waiting on the . Pt feels he was sitting oddly. By  morning he had pain in his lower back, into the groin and down both legs. Pt states he has a lot of pressure in his lower back and walking is painful. Pt states if he is sitting or lying down he is ok. Pt needs a note for today because he missed work. Pt is off the next few days so hopefully the medications will help hum get  back to work.    ROS:  GENERAL:  Denies fever, chills,weight change, fatigue  SKIN: Denies rashes, skin lesions  EYES: Denies blurred vision or double vision  HENT: Denies congestion,sore throat or ear pain.   CHEST: Denies chest pain, or palpitations  LUNGS: Denies shortness of breath,wheezing or cough  GI: Denies abdominal pain, N/V/C/D.   MUSCULOSKELETAL: see HPI  LYMPH:  Denies lymphadenopathy  NEURO:  Denies headaches and lightheadedness.   PSYCH: denies depression or anxiety    ALLERGIES:  Allergies[1]    Current Medications[2]    Past Medical History[3]   Past Surgical History[4]   Short Social Hx on File[5]    Family History[6]     PE:  No vital signs were taken for this video visit.  GENERAL: well developed, well nourished, in no acute distress  SKIN: no rashes on visible parts of skin  HEENT: normocephalic, atraumatic, conjunctiva clear  LUNGS: regular breathing rate and effort with no audible respiratory distress  NEURO: A&Ox3  PSYCH:  pleasant mood and affect, appropriate judgement and insight  MUSC: Ambulation he is slow, Pt walking with a cane and pauses due to the pain.    ASSESSMENT/PLAN:    Encounter Diagnosis   Name Primary?    Acute bilateral low back pain with bilateral sciatica Yes       No orders of the defined types were placed in this encounter.      Meds & Refills for this Visit:  Requested Prescriptions     Signed Prescriptions Disp Refills    predniSONE 10 MG Oral Tab 20 tablet 0     Sig: Day 1-2 four tabs, day 3-4 three tabs, day 5-6 two tabs,day 7-8 one tab    cyclobenzaprine 10 MG Oral Tab 30 tablet 0     Sig: Take 1 tablet (10 mg total) by mouth 3 (three) times daily as needed.       Imaging & Consults:  None    Rest, heat/ice, stretches. Start prednisone and take with food.  The patient indicates understanding of these issues and agrees to the plan.  The patient is asked to call if not improving. Pt may need to go see his back doctor.    *Please note that the following visit was completed using two-way, real-time interactive audio and/or video communication.  This has been done in good sonido to provide continuity of care in the best interest of the provider-patient relationship, due to the ongoing public health crisis/national emergency and because of restrictions of visitation.  There are limitations of this visit as physical exam could not be fully performed.  Every conscious effort was taken to allow for sufficient and adequate time.  Included in this visit, time may have been spent reviewing labs, medications, radiology tests and decision-making.  Appropriate medical decision-making and tests are ordered as detailed in the plan of care above.  Coding/billing information is submitted for this visit based on complexity of care and/or time spent for the visit.      Start Time: 4:00 pm  End Time: 4:20 pm    Lynne CHATTERJEE         [1]   Allergies  Allergen Reactions    Seasonal Runny nose   [2]   Current Outpatient  Medications   Medication Sig Dispense Refill    predniSONE 5 MG Oral Tab Take 4 tablets (20 mg total) by mouth daily for 3 days, THEN 3 tablets (15 mg total) daily for 3 days, THEN 2 tablets (10 mg total) daily for 3 days, THEN 1 tablet (5 mg total) daily for 3 days, THEN 1 tablet (5 mg total) daily as needed. Take as instructed in the morning with breakfast for 12 days.  Then, after 12 days, only take prednisone up to 1 tablet daily as needed for breakthrough pain. 60 tablet 0    allopurinol 100 MG Oral Tab Take 1.5 tablets (150 mg total) by mouth daily. 90 tablet 2    predniSONE 5 MG Oral Tab Take 1 tablet (5 mg total) by mouth After Breakfast. 90 tablet 2    triamcinolone 0.1 % External Cream Apply 1 Application topically 3 (three) times daily. 45 g 0    amLODIPine 5 MG Oral Tab Take 1 tablet (5 mg total) by mouth nightly. 90 tablet 0    carvedilol 25 MG Oral Tab Take 1 tablet (25 mg total) by mouth 2 (two) times daily with meals. 180 tablet 0    Irbesartan 150 MG Oral Tab Take 1 tablet (150 mg total) by mouth nightly. 90 tablet 0    Meloxicam 15 MG Oral Tab Take 1 tablet (15 mg total) by mouth daily as needed.      FREESTYLE LITE TEST In Vitro Strip TEST DAILY AS DIRECTED 100 strip 0    Blood Glucose Monitoring Suppl (FREESTYLE LITE) Does not apply Device   0    Multiple Vitamin (MULTIVITAMINS) Oral Tab Take 1 tablet by mouth daily.     [3]   Past Medical History:   Back problem    Biliary calculus    CKD (chronic kidney disease)    Diabetes (HCC)    Diabetes mellitus (HCC)    Essential hypertension    Gout    High blood pressure    History of gallstones    History of morbid obesity    Lumbar stenosis with neurogenic claudication    Osteoarthritis    Renal disorder   [4]   Past Surgical History:  Procedure Laterality Date    Excis lumbar disk,one level      Hip replacement surgery  2014    both    Other surgical history  2014    bilateral hip replacement    Total hip replacement     [5]   Social  History  Socioeconomic History    Marital status: Single   Tobacco Use    Smoking status: Never    Smokeless tobacco: Never   Vaping Use    Vaping status: Never Used   Substance and Sexual Activity    Alcohol use: No    Drug use: No   Other Topics Concern    Caffeine Concern Yes     Comment: 1 cup of coffee daily and 1-2 cans of Pepsi daily.     Exercise Yes     Comment: 1-2 days/week    Pt has a pacemaker No    Reaction to local anesthetic No   Social History Narrative    Live alone    UA ramp reza    6 grandchildren        No tob - LLNS    No etoh        No pets     Social Drivers of Health      Received from [facility]    Housing Stability   [6]   Family History  Adopted: Yes   Problem Relation Age of Onset    Heart Attack Father     Heart Attack Mother

## 2025-05-15 ENCOUNTER — TELEPHONE (OUTPATIENT)
Dept: INTERNAL MEDICINE CLINIC | Facility: CLINIC | Age: 70
End: 2025-05-15

## 2025-05-19 NOTE — TELEPHONE ENCOUNTER
The FMLA form is completed for the Gout. If he wants a form filled out for his sciatica it will only be for the episode he just had. If he feels he needs FMLA for the future then he should see his back doctor.

## 2025-05-20 NOTE — TELEPHONE ENCOUNTER
Left detailed message for pt to return call.    Per Alice, Forms for Gout filled out and placed at the  (copies made and placed to scan).    Per Alice forms for Sciatic nerve pain- She can only fill out dates from the past, cannot due future dates for FMLA.  If pt wants future date for FMLA needs to follow up with his specialist for his back.    (I have form)    MCM sent to pt as well.

## 2025-06-09 ENCOUNTER — TELEMEDICINE (OUTPATIENT)
Dept: INTERNAL MEDICINE CLINIC | Facility: CLINIC | Age: 70
End: 2025-06-09
Payer: COMMERCIAL

## 2025-06-09 ENCOUNTER — TELEPHONE (OUTPATIENT)
Dept: INTERNAL MEDICINE CLINIC | Facility: CLINIC | Age: 70
End: 2025-06-09

## 2025-06-09 DIAGNOSIS — M79.89 LOCALIZED SWELLING OF BOTH LOWER EXTREMITIES: Primary | ICD-10-CM

## 2025-06-09 RX ORDER — FUROSEMIDE 20 MG/1
20 TABLET ORAL DAILY
Qty: 14 TABLET | Refills: 0 | Status: SHIPPED | OUTPATIENT
Start: 2025-06-09 | End: 2025-06-23

## 2025-06-09 RX ORDER — PREDNISONE 5 MG/1
TABLET ORAL
Qty: 60 TABLET | Refills: 0 | Status: SHIPPED | OUTPATIENT
Start: 2025-06-09 | End: 2025-07-21

## 2025-06-09 NOTE — TELEPHONE ENCOUNTER
Pt scheduled himself earlier as an in office visit, then switched visit to Video visit. Per Alice, pt will need to be triaged to see where the swelling is/ what exactly is going on.     Appt. @4:15pm

## 2025-06-09 NOTE — TELEPHONE ENCOUNTER
Refill sent for prednisone.  However, could someone reach out to the patient to remind him regarding updated lab work.  Thank you.

## 2025-06-09 NOTE — TELEPHONE ENCOUNTER
Spoke with patient to triage. Patient states that he rescheduled his appointment because his water was off and he wanted to shower prior to his appointment.     Patient was traveling from [place], he drove with family 17 hours to [place] and flew back this morning. Pt states that he is experiencing swelling his left ankle and doesn't think it's gout because there's no pain. Pt states that the right ankle is a little puffy. When patient moves, swelling goes down. Pt denies shortness of breath, but he was nervous on the flight because there was a lot of turbulence. When he was off the plane, he was good. Pt denies cough, pain, but does have stiffness. Pt states that when he presses on his ankle, skin comes back up pretty quick. The top of his foot is swollen. Pt has been urinating more and drinking more water.     SM: OK to keep VV for 4:15PM? Pt just experiencing swelling in left ankle and some in right ankle after traveling.     Future Appointments   Date Time Provider Department Center   6/9/2025  4:15 PM Lynne Sánchez APRN [facility] [place]   6/24/2025 10:30 AM Kathie Luque APRN [facility]   8/1/2025  2:00 PM Mustapha Colbert MD [id number]

## 2025-06-09 NOTE — PROGRESS NOTES
Virtual Video Check-In     This visit is conducted using Telemedicine with live, interactive video and audio.    Mustapha Meyers, who has verified his/her identification by name and , verbally consents to a Virtual/Telephone Check-In visit on 25.  Patient confirms that he/she is in the [facility] at the time of service.  Patient understands and accepts financial responsibility for any deductible, co-insurance and/or co-pays associated with this service.    TIME: 20 minutes      HPI: The patient complains of swelling of the lower legs. Has had for few days. Pt drove 17 hours with his son to [facility], Pt states they only stopped 3 times for about 15 minutes so did not walk much.  Pt states they also ate a lot of pretzels during their dinah trip. Pt states it was real hot and humid in [place] so that did not help either.Pt denies pain.. Patient denies any PATEL or orthopnea. Pt has not tried support hose. Pt has them but has not put them on. Pt has also did not kept his feet elevated.  Discussed trying a diuretic and Pt would like to.      ROS:  GENERAL:  Denies fever, chills,weight change, fatigue  SKIN: Denies rashes, skin lesions  EYES: Denies blurred vision or double vision  HENT: Denies congestion,sore throat or ear pain.   CHEST: Denies chest pain, or palpitations  LUNGS: Denies shortness of breath,wheezing or cough  GI: Denies abdominal pain, N/V/C/D.   MUSCULOSKELETAL: Denies any arthralgia,myalgias or swollen joints  LYMPH:  Denies lymphadenopathy  NEURO:  Denies headaches and lightheadedness.   PSYCH: denies depression or anxiety    ALLERGIES:  Allergies[1]    Current Medications[2]    Past Medical History[3]   Past Surgical History[4]   Short Social Hx on File[5]    Family History[6]     PE:  No vital signs were taken for this video visit.  GENERAL: well developed, well nourished, in no acute distress  SKIN: no rashes on visible parts of skin  HEENT: normocephalic, atraumatic, conjunctiva  clear  LUNGS: regular breathing rate and effort with no audible respiratory distress  NEURO: A&Ox3  PSYCH: pleasant mood and affect, appropriate judgement and insight    ASSESSMENT/PLAN:    Encounter Diagnosis   Name Primary?    Localized swelling of both lower extremities Yes       No orders of the defined types were placed in this encounter.      Meds & Refills for this Visit:  Requested Prescriptions     Signed Prescriptions Disp Refills    furosemide (LASIX) 20 MG Oral Tab 14 tablet 0     Sig: Take 1 tablet (20 mg total) by mouth daily for 14 days.       Imaging & Consults:  None    Elevate feet when resting, wear compression socks  Avoid salt and processed foods.  Start the lasix and take it daily  If any pain develops in either leg to go to the ER to be assessed.  The patient indicates understanding of these issues and agrees to the plan.  The patient is asked to return to the office if still having issues..    *Please note that the following visit was completed using two-way, real-time interactive audio and/or video communication.  This has been done in good sonido to provide continuity of care in the best interest of the provider-patient relationship, due to the ongoing public health crisis/national emergency and because of restrictions of visitation.  There are limitations of this visit as physical exam could not be fully performed.  Every conscious effort was taken to allow for sufficient and adequate time.  Included in this visit, time may have been spent reviewing labs, medications, radiology tests and decision-making.  Appropriate medical decision-making and tests are ordered as detailed in the plan of care above.  Coding/billing information is submitted for this visit based on complexity of care and/or time spent for the visit.      Start Time: 4:15 pm  End Time: 4:35 pm    Lynne CHATTERJEE         [1]   Allergies  Allergen Reactions    Seasonal Runny nose   [2]   Current Outpatient Medications    Medication Sig Dispense Refill    predniSONE 10 MG Oral Tab Day 1-2 four tabs, day 3-4 three tabs, day 5-6 two tabs,day 7-8 one tab 20 tablet 0    predniSONE 5 MG Oral Tab Take 4 tablets (20 mg total) by mouth daily for 3 days, THEN 3 tablets (15 mg total) daily for 3 days, THEN 2 tablets (10 mg total) daily for 3 days, THEN 1 tablet (5 mg total) daily for 3 days, THEN 1 tablet (5 mg total) daily as needed. Take as instructed in the morning with breakfast for 12 days.  Then, after 12 days, only take prednisone up to 1 tablet daily as needed for breakthrough pain. 60 tablet 0    allopurinol 100 MG Oral Tab Take 1.5 tablets (150 mg total) by mouth daily. 90 tablet 2    predniSONE 5 MG Oral Tab Take 1 tablet (5 mg total) by mouth After Breakfast. 90 tablet 2    triamcinolone 0.1 % External Cream Apply 1 Application topically 3 (three) times daily. 45 g 0    amLODIPine 5 MG Oral Tab Take 1 tablet (5 mg total) by mouth nightly. 90 tablet 0    carvedilol 25 MG Oral Tab Take 1 tablet (25 mg total) by mouth 2 (two) times daily with meals. 180 tablet 0    Irbesartan 150 MG Oral Tab Take 1 tablet (150 mg total) by mouth nightly. 90 tablet 0    Meloxicam 15 MG Oral Tab Take 1 tablet (15 mg total) by mouth daily as needed.      FREESTYLE LITE TEST In Vitro Strip TEST DAILY AS DIRECTED 100 strip 0    Blood Glucose Monitoring Suppl (FREESTYLE LITE) Does not apply Device   0    Multiple Vitamin (MULTIVITAMINS) Oral Tab Take 1 tablet by mouth daily.     [3]   Past Medical History:   Back problem    Biliary calculus    CKD (chronic kidney disease)    Diabetes (HCC)    Diabetes mellitus (HCC)    Essential hypertension    Gout    High blood pressure    History of gallstones    History of morbid obesity    Lumbar stenosis with neurogenic claudication    Osteoarthritis    Renal disorder   [4]   Past Surgical History:  Procedure Laterality Date    Excis lumbar disk,one level      Hip replacement surgery  2014    both    Other surgical  history  2014    bilateral hip replacement    Total hip replacement     [5]   Social History  Socioeconomic History    Marital status: Single   Tobacco Use    Smoking status: Never    Smokeless tobacco: Never   Vaping Use    Vaping status: Never Used   Substance and Sexual Activity    Alcohol use: No    Drug use: No   Other Topics Concern    Caffeine Concern Yes     Comment: 1 cup of coffee daily and 1-2 cans of Pepsi daily.     Exercise Yes     Comment: 1-2 days/week    Pt has a pacemaker No    Reaction to local anesthetic No   Social History Narrative    Live alone    UA ramp reza    6 grandchildren        No tob - LLNS    No etoh        No pets     Social Drivers of Health      Received from [facility]    Housing Stability   [6]   Family History  Adopted: Yes   Problem Relation Age of Onset    Heart Attack Father     Heart Attack Mother

## 2025-06-09 NOTE — TELEPHONE ENCOUNTER
Requested Prescriptions     Pending Prescriptions Disp Refills    PREDNISONE 5 MG Oral Tab [Pharmacy Med Name: PREDNISONE 5 MG TABLET] 60 tablet 0     Sig: TAKE 4 TABLETS (20 MG TOTAL) BY MOUTH DAILY FOR 3 DAYS, THEN 3 TABLETS (15 MG TOTAL) DAILY FOR 3 DAYS, THEN 2 TABLETS (10 MG TOTAL) DAILY FOR 3 DAYS, THEN 1 TABLET (5 MG TOTAL) DAILY FOR 3 DAYS, THEN 1 TABLET (5 MG TOTAL) DAILY AS NEEDED. TAKE AS INSTRUCTED IN THE MORNING WITH BREAKFAST FOR 12 DAYS. THEN, AFTER 12 DAYS, ONLY TAKE PREDNISONE UP TO 1 TABLET DAILY AS NEEDED FOR BREAKTHROUGH PAIN.     LOV: 3/26/25  Future Appointments   Date Time Provider Department Center   6/9/2025  4:15 PM Lynne Sánchez APRN [facility] [place]   6/24/2025 10:30 AM Kathie Luque APRN [facility]   8/1/2025  2:00 PM Mustapha Colbert MD [id number]     Labs:   Component      Latest Ref Rng 3/11/2025   WBC      4.0 - 11.0 x10(3) uL 6.6    RBC      3.80 - 5.80 x10(6)uL 4.87    Hemoglobin      13.0 - 17.5 g/dL 14.7    Hematocrit      39.0 - 53.0 % 46.6    Platelet Count      150.0 - 450.0 10(3)uL 209.0    MCV      80.0 - 100.0 fL 95.7    MCH      26.0 - 34.0 pg 30.2    MCHC      31.0 - 37.0 g/dL 31.5    RDW      % 14.6    Prelim Neutrophil Abs      1.50 - 7.70 x10 (3) uL 3.75    Neutrophils Absolute      1.50 - 7.70 x10(3) uL 3.75    Lymphocytes Absolute      1.00 - 4.00 x10(3) uL 2.06    Monocytes Absolute      0.10 - 1.00 x10(3) uL 0.60    Eosinophils Absolute      0.00 - 0.70 x10(3) uL 0.14    Basophils Absolute      0.00 - 0.20 x10(3) uL 0.04    Immature Granulocyte Absolute      0.00 - 1.00 x10(3) uL 0.02    Neutrophils %      % 56.7    Lymphocytes %      % 31.2    Monocytes %      % 9.1    Eosinophils %      % 2.1    Basophils %      % 0.6    Immature Granulocyte %      % 0.3    Glucose      70 - 99 mg/dL 123 (H)    Sodium      136 - 145 mmol/L 145    Potassium      3.5 - 5.1 mmol/L 4.9    Chloride      98 - 112 mmol/L 105    Carbon Dioxide, Total       21.0 - 32.0 mmol/L 33.0 (H)    ANION GAP      0 - 18 mmol/L 7    BUN      9 - 23 mg/dL 22    CREATININE      0.70 - 1.30 mg/dL 1.98 (H)    CALCIUM      8.7 - 10.6 mg/dL 9.9    CALCULATED OSMOLALITY      275 - 295 mOsm/kg 305 (H)    EGFR      >=60 mL/min/1.73m2 36 (L)    AST (SGOT)      <34 U/L 40 (H)    ALT (SGPT)      10 - 49 U/L 39    ALKALINE PHOSPHATASE      45 - 117 U/L 184 (H)    Total Bilirubin      0.2 - 1.1 mg/dL 0.8    PROTEIN, TOTAL      5.7 - 8.2 g/dL 7.8    Albumin      3.2 - 4.8 g/dL 4.6    Globulin      2.0 - 3.5 g/dL 3.2    A/G Ratio      1.0 - 2.0  1.4    Patient Fasting for CMP? 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  Will titrate slowly pending response given CKD  - Modify to prednisone 5 mg daily as prophylaxis until goal uric acid reached.  Would avoid colchicine in the setting of carvedilol and CKD  - Consult/evaluation communicated with referring physician/provider.     Patient will have labs drawn in 1 month and I will MyChart him regarding further titration of medications.  Otherwise, patient to follow-up in 2 months with repeat labs from prior.     Jaime Duncan DO  3/26/2025   12:21 PM

## 2025-06-17 ENCOUNTER — PATIENT MESSAGE (OUTPATIENT)
Dept: INTERNAL MEDICINE CLINIC | Facility: CLINIC | Age: 70
End: 2025-06-17

## 2025-06-17 DIAGNOSIS — M25.572 ACUTE LEFT ANKLE PAIN: Primary | ICD-10-CM

## 2025-06-23 ENCOUNTER — APPOINTMENT (OUTPATIENT)
Dept: ULTRASOUND IMAGING | Age: 70
End: 2025-06-23
Attending: PHYSICIAN ASSISTANT
Payer: COMMERCIAL

## 2025-06-23 ENCOUNTER — HOSPITAL ENCOUNTER (OUTPATIENT)
Age: 70
Discharge: HOME | End: 2025-06-23
Payer: COMMERCIAL

## 2025-06-23 ENCOUNTER — TELEPHONE (OUTPATIENT)
Facility: CLINIC | Age: 70
End: 2025-06-23

## 2025-06-23 VITALS
OXYGEN SATURATION: 96 % | HEART RATE: 92 BPM | SYSTOLIC BLOOD PRESSURE: 159 MMHG | DIASTOLIC BLOOD PRESSURE: 84 MMHG | TEMPERATURE: 99 F | RESPIRATION RATE: 22 BRPM

## 2025-06-23 DIAGNOSIS — M79.672 PAIN OF LEFT HEEL: Primary | ICD-10-CM

## 2025-06-23 DIAGNOSIS — M79.605 LEFT LEG PAIN: Primary | ICD-10-CM

## 2025-06-23 PROCEDURE — 93971 EXTREMITY STUDY: CPT | Performed by: PHYSICIAN ASSISTANT

## 2025-06-23 PROCEDURE — 99213 OFFICE O/P EST LOW 20 MIN: CPT | Performed by: PHYSICIAN ASSISTANT

## 2025-06-23 NOTE — TELEPHONE ENCOUNTER
SHELBI ordered and scheduled per Ortho protocol.   Sent patient message via [employer] to inform them and ask them to arrive 15-20 minutes prior to appointment with

## 2025-06-23 NOTE — ED PROVIDER NOTES
Patient Seen in: [facility]        History  Chief Complaint   Patient presents with    Swelling Edema     Stated Complaint: L Foot Swelling    Subjective:   HPI            Patient is a 62-year-old male who presents to immediate care due to left lower leg edema over the past few weeks.  Patient states symptoms started when he returned home from a car road trip.  Patient was prescribed Lasix and has been compliant over the past few weeks.  Notes improvement in symptoms however has had persistent lower leg edema improves with ambulation.  Denies chest pain shortness of breath palpitations.  Denies history of PE DVT      Objective:     No pertinent past medical history.            No pertinent past surgical history.              No pertinent social history.            Review of Systems    Positive for stated complaint: L Foot Swelling  Other systems are as noted in HPI.  Constitutional and vital signs reviewed.      All other systems reviewed and negative except as noted above.                  Physical Exam    ED Triage Vitals [06/23/25 0804]   /84   Pulse 92   Resp 22   Temp 98.6 °F (37 °C)   Temp src Oral   SpO2 96 %   O2 Device None (Room air)       Current Vitals:   Vital Signs  BP: 159/84  Pulse: 92  Resp: 22  Temp: 98.6 °F (37 °C)  Temp src: Oral    Oxygen Therapy  SpO2: 96 %  O2 Device: None (Room air)            Physical Exam  Vital signs reviewed. Nursing note reviewed.  Constitutional: Well-developed. Well-nourished. In no acute distress  HENT: Mucous membranes moist.   EYES: No scleral icterus or conjunctival injection.  NECK: Full ROM. Supple.   CARDIAC: Normal rate. Normal S1/ S2. 2+ distal pulses. No edema  PULM/CHEST: Clear to auscultation bilaterally. No wheezes  Extremities: Full ROM lower extremities, pitting edema of the left lower leg.  No overlying skin color changes.  2+ dorsalis pedis pulses bilaterally.  No erythema warmth ecchymosis lacerations.  NEURO: Awake, alert,  following commands, moving extremities, answering questions.   SKIN: Warm and dry. No rash or lesions.  PSYCH: Normal judgment. Normal affect.                          MDM     Patient is a 70-year-old male who presents to immediate care due to left lower leg pain and edema over the past 2 weeks.  Patient arrives with stable vitals sitting comfortably.  Physical exam showing moderate pitting edema of left lower extremity with strong distal pulses no overlying ecchymosis erythema or warmth.  Differential diagnosis includes dependent edema, DVT.  less likely acute fracture or dislocation without known injury or fall.  History given by patient.    9:13 AM  Encourage patient to continue outpatient follow-up.  Patient states that he has podiatry follow-up in 1 week.  Patient additionally states he has appointment with vascular.  Encourage close PCP follow-up and to continue previously prescribed Lasix tablets as patient does note that symptoms mildly improve his lower leg edema.  Discussed with patient ED precautions including worsening symptoms, skin color changes, chest pain shortness of breath.  Patient agreeable to plan all questions answered.  Work note given.    Medical Decision Making      Disposition and Plan     Clinical Impression:  1. Left leg pain         Disposition:  Discharge  6/23/2025  9:07 am    Follow-up:  No follow-up provider specified.        Medications Prescribed:  Discharge Medication List as of 6/23/2025  9:07 AM                Supplementary Documentation:

## 2025-06-24 ENCOUNTER — OFFICE VISIT (OUTPATIENT)
Dept: ORTHOPEDICS CLINIC | Facility: CLINIC | Age: 70
End: 2025-06-24
Payer: COMMERCIAL

## 2025-06-24 ENCOUNTER — HOSPITAL ENCOUNTER (OUTPATIENT)
Dept: GENERAL RADIOLOGY | Age: 70
Discharge: HOME | End: 2025-06-24
Attending: PODIATRIST
Payer: COMMERCIAL

## 2025-06-24 VITALS — HEIGHT: 67 IN | BODY MASS INDEX: 46.3 KG/M2 | WEIGHT: 295 LBS

## 2025-06-24 DIAGNOSIS — Z87.39 HISTORY OF GOUT: ICD-10-CM

## 2025-06-24 DIAGNOSIS — E66.813 CLASS 3 SEVERE OBESITY DUE TO EXCESS CALORIES WITH SERIOUS COMORBIDITY AND BODY MASS INDEX (BMI) OF 50.0 TO 59.9 IN ADULT: ICD-10-CM

## 2025-06-24 DIAGNOSIS — E11.9 DIABETES MELLITUS WITHOUT COMPLICATION (HCC): ICD-10-CM

## 2025-06-24 DIAGNOSIS — M79.672 PAIN OF LEFT HEEL: ICD-10-CM

## 2025-06-24 DIAGNOSIS — M79.672 PAIN OF LEFT HEEL: Primary | ICD-10-CM

## 2025-06-24 DIAGNOSIS — M72.2 PLANTAR FASCIITIS: ICD-10-CM

## 2025-06-24 PROCEDURE — 99204 OFFICE O/P NEW MOD 45 MIN: CPT | Performed by: PODIATRIST

## 2025-06-24 PROCEDURE — 73650 X-RAY EXAM OF HEEL: CPT | Performed by: PODIATRIST

## 2025-06-24 RX ORDER — TIRZEPATIDE 2.5 MG/.5ML
2.5 INJECTION, SOLUTION SUBCUTANEOUS WEEKLY
COMMUNITY

## 2025-06-24 NOTE — PROGRESS NOTES
[facility] New Patient Note    CC:   Chief Complaint   Patient presents with    Foot Pain     Left heel pain;   ONSET: a couple of weeks ago   Pain Score: 4--6+       HPI: The patient is a 70 year old male who presents today with complaints of left heel pain of about 2 weeks  Swelling and red of inside of left foot and ankle  Some warm  No injury    Gout?yes hx of gout  Was on 17 hr drive    Sits at work long hours    No DVT-recent doppler  Being treated for swelling LE w lasix    Hx of gout foot ankle knee and wrist        Past Medical History[1]  Past Surgical History[2]  Current Medications[3]  Allergies[4]  Family History[5]  Social History     Occupational History    Not on file   Tobacco Use    Smoking status: Never    Smokeless tobacco: Never   Vaping Use    Vaping status: Never Used   Substance and Sexual Activity    Alcohol use: No    Drug use: No    Sexual activity: Not on file        ROS:  Complete ROS reviewed by me and non-contributory to the chief complaint except as mentioned above.    Physical Exam:    Ht 5' 7\" (1.702 m)   Wt 295 lb (133.8 kg)   BMI 46.20 kg/m²   Exam left LE  No calf pain, no ankle pain  Non ambulatory today-in wheelchair    Pt has pain at plantar fascial insertion area of heel.   pain with side to side compression of calcaneus.  No swelling.  Negative tinel's sign at medial ankle.    Warm, swelling erythema medial heel/arch area  Foot warm well perfused  Sensation intact  Strength testing deferred today due to pain    Imaging:  heel views left  Poor images? Concern for stress fracture.  No heel spur.  Flat talus. personally viewed, independently interpreted and radiology report read.      Assessment/Diagnoses:  Diagnoses and all orders for this visit:    Pain of left heel  -     z [employer]ight MRI ANKLE, LEFT (CPT=73721); Future  -     DME - EXTERNAL     History of gout    Plantar fasciitis    Class 3 severe obesity due to excess calories with serious comorbidity and body  mass index (BMI) of 50.0 to 59.9 in adult    Diabetes mellitus without complication (HCC)        Plan:  I reviewed imaging and exam findings with the patient.    He has a lot of pain with compression of calcaneus  Is not likely, he has no recent injury and actually is pretty sedentary, but we need to check for a stress fracture.  X-rays look a little inconclusive so we will go ahead with an MRI.  An injection first plantar fasciitis would not be a good idea if we are dealing with a fracture  Condition of plantar fascitis (etiology, anatomy) and treatment plan were discussed, including stretching, ice, massage, good shoegear, arch support and consideration of a series of 3 steroid injections.  Physical therapy also discussed.  Consider custom orthotics.      Boot for help w pain  No injection or steroid until MRI findings    Will likely do PT and injection if MRI shows plantar fascitis  Dian Phillips, DPodiatric Surgery  FACFAS  [facility]  [address], [address], [place] [address] [address] [place] [address] [email or web address]  [email or web address]  t: [phone]   f: [phone]              This document was partially prepared using Dragon Medical voice recognition software.            [1]   Past Medical History:   Back problem    Biliary calculus    CKD (chronic kidney disease)    Diabetes (HCC)    Diabetes mellitus (HCC)    Essential hypertension    Gout    High blood pressure    History of gallstones    History of morbid obesity    Lumbar stenosis with neurogenic claudication    Osteoarthritis    Renal disorder   [2]   Past Surgical History:  Procedure Laterality Date    Excis lumbar disk,one level      Hip replacement surgery  2014    both    Other surgical history  2014    bilateral hip replacement    Total hip replacement     [3]   Current Outpatient Medications   Medication Sig Dispense Refill    PREDNISONE 5 MG Oral Tab TAKE 4 TABLETS (20 MG TOTAL) BY MOUTH  DAILY FOR 3 DAYS, THEN 3 TABLETS (15 MG TOTAL) DAILY FOR 3 DAYS, THEN 2 TABLETS (10 MG TOTAL) DAILY FOR 3 DAYS, THEN 1 TABLET (5 MG TOTAL) DAILY FOR 3 DAYS, THEN 1 TABLET (5 MG TOTAL) DAILY AS NEEDED. TAKE AS INSTRUCTED IN THE MORNING WITH BREAKFAST FOR 12 DAYS. THEN, AFTER 12 DAYS, ONLY TAKE PREDNISONE UP TO 1 TABLET DAILY AS NEEDED FOR BREAKTHROUGH PAIN. 60 tablet 0    predniSONE 10 MG Oral Tab Day 1-2 four tabs, day 3-4 three tabs, day 5-6 two tabs,day 7-8 one tab (Patient taking differently: as needed. Day 1-2 four tabs, day 3-4 three tabs, day 5-6 two tabs,day 7-8 one tab) 20 tablet 0    predniSONE 5 MG Oral Tab Take 1 tablet (5 mg total) by mouth After Breakfast. (Patient taking differently: Take 1 tablet (5 mg total) by mouth daily.) 90 tablet 2    triamcinolone 0.1 % External Cream Apply 1 Application topically 3 (three) times daily. 45 g 0    amLODIPine 5 MG Oral Tab Take 1 tablet (5 mg total) by mouth nightly. 90 tablet 0    carvedilol 25 MG Oral Tab Take 1 tablet (25 mg total) by mouth 2 (two) times daily with meals. 180 tablet 0    Irbesartan 150 MG Oral Tab Take 1 tablet (150 mg total) by mouth nightly. 90 tablet 0    Meloxicam 15 MG Oral Tab Take 1 tablet (15 mg total) by mouth daily as needed.      FREESTYLE LITE TEST In Vitro Strip TEST DAILY AS DIRECTED 100 strip 0    Blood Glucose Monitoring Suppl (FREESTYLE LITE) Does not apply Device   0    Tirzepatide (MOUNJARO) 2.5 MG/0.5ML Subcutaneous Solution Auto-injector Inject 2.5 mg into the skin once a week. (Patient not taking: Reported on 6/24/2025)      allopurinol 100 MG Oral Tab Take 1.5 tablets (150 mg total) by mouth daily. (Patient not taking: Reported on 6/24/2025) 90 tablet 2    Multiple Vitamin (MULTIVITAMINS) Oral Tab Take 1 tablet by mouth daily. (Patient not taking: Reported on 6/24/2025)     [4]   Allergies  Allergen Reactions    Seasonal Runny nose   [5]   Family History  Adopted: Yes   Problem Relation Age of Onset    Heart Attack  Father     Heart Attack Mother

## 2025-06-26 ENCOUNTER — TELEMEDICINE (OUTPATIENT)
Dept: INTERNAL MEDICINE CLINIC | Facility: CLINIC | Age: 70
End: 2025-06-26
Payer: COMMERCIAL

## 2025-06-26 DIAGNOSIS — I10 ESSENTIAL HYPERTENSION: ICD-10-CM

## 2025-06-26 DIAGNOSIS — E11.9 DIABETES MELLITUS WITHOUT COMPLICATION (HCC): ICD-10-CM

## 2025-06-26 DIAGNOSIS — M10.9 GOUT OF ANKLE, UNSPECIFIED CAUSE, UNSPECIFIED CHRONICITY, UNSPECIFIED LATERALITY: Primary | ICD-10-CM

## 2025-06-26 PROCEDURE — 98005 SYNCH AUDIO-VIDEO EST LOW 20: CPT | Performed by: FAMILY MEDICINE

## 2025-06-26 RX ORDER — LANCETS 33 GAUGE
1 EACH MISCELLANEOUS DAILY
Qty: 100 EACH | Refills: 2 | Status: SHIPPED | OUTPATIENT
Start: 2025-06-26

## 2025-06-26 RX ORDER — AMLODIPINE BESYLATE 5 MG/1
5 TABLET ORAL NIGHTLY
Qty: 90 TABLET | Refills: 0 | Status: SHIPPED | OUTPATIENT
Start: 2025-06-26

## 2025-06-26 RX ORDER — IRBESARTAN 150 MG/1
150 TABLET ORAL NIGHTLY
Qty: 90 TABLET | Refills: 0 | Status: SHIPPED | OUTPATIENT
Start: 2025-06-26

## 2025-06-26 NOTE — PROGRESS NOTES
Virtual Video Check-In     This visit is conducted using Telemedicine with live, interactive video and audio.    Mustapha Meyers, who has verified his/her identification by name and , verbally consents to a Virtual/Telephone Check-In visit on 25.  Patient confirms that he/she is in the [facility] at the time of service.  Patient understands and accepts financial responsibility for any deductible, co-insurance and/or co-pays associated with this service.    TIME: 23 minutes      HPI: The patient complains of swelling of the left  lower leg. Has had for since -. Pt drove 17 hours with his son to [facility], Pt states they only stopped 3 times for about 15 minutes so did not walk much. The swelling is worse when he is more sedentary The swelling improves if he is walking. Pt was seen in the  and had a doppler done which was negative for DVT. Pt also saw the podiatrist 2 days ago for foot pain-plantar fasciitis.  Podiatrist noted his ankle was tender and a little redden-poss gout.Pt needs go have a MRI to r/o a possible stress fracture. The lasix does help some but Pt's Rheumatologist put him on prednisone yesterday and the swelling has gone down a lot. Pt feels it was probably a gout attack. Pt hopes the swelling will go down so he can go back to work on Saturday.      ROS:  GENERAL:  Denies fever, chills,weight change, fatigue  SKIN: Denies rashes, skin lesions  EYES: Denies blurred vision or double vision  HENT: Denies congestion,sore throat or ear pain.   CHEST: Denies chest pain, or palpitations  LUNGS: Denies shortness of breath,wheezing or cough  GI: Denies abdominal pain, N/V/C/D.   MUSCULOSKELETAL: Denies any arthralgia,myalgias or swollen joints  LYMPH:  Denies lymphadenopathy  NEURO:  Denies headaches and lightheadedness.   PSYCH: denies depression or anxiety    ALLERGIES:  Allergies[1]    Current Medications[2]    Past Medical History[3]   Past Surgical History[4]   Short  Social Hx on File[5]    Family History[6]     PE:  No vital signs were taken for this video visit.  GENERAL: well developed, well nourished, in no acute distress  SKIN: no rashes on visible parts of skin  HEENT: normocephalic, atraumatic, conjunctiva clear  LUNGS: regular breathing rate and effort with no audible respiratory distress  NEURO: A&Ox3  PSYCH: pleasant mood and affect, appropriate judgement and insight    ASSESSMENT/PLAN:    Encounter Diagnoses   Name Primary?    Essential hypertension     Gout of ankle, unspecified cause, unspecified chronicity, unspecified laterality Yes       No orders of the defined types were placed in this encounter.      Meds & Refills for this Visit:  Requested Prescriptions     Signed Prescriptions Disp Refills    amLODIPine 5 MG Oral Tab 90 tablet 0     Sig: Take 1 tablet (5 mg total) by mouth nightly.    Irbesartan 150 MG Oral Tab 90 tablet 0     Sig: Take 1 tablet (150 mg total) by mouth nightly.       Imaging & Consults:  None    1. Essential hypertension  Continue medication.  Diet and exercise explained and encouraged.  Home blood pressure monitoring. Pt should measure BP’s two to three times per week. Goal blood pressure at home - < 135/85.     - amLODIPine 5 MG Oral Tab; Take 1 tablet (5 mg total) by mouth nightly.  Dispense: 90 tablet; Refill: 0  - Irbesartan 150 MG Oral Tab; Take 1 tablet (150 mg total) by mouth nightly.  Dispense: 90 tablet; Refill: 0    2. Gout of ankle, unspecified cause, unspecified chronicity, unspecified laterality  Pt to finish prednisone given to him by his Rheumatologist. Pt to remember to take his allopurinol daily. Avoid trigger foods/drinks.    3. Diabetes mellitus without complication (HCC)  Continue present medications. Check sugars daily or as directed.  Eat a low fat, low carb, low cholesterol diet. Call with sugars less than 70 or greater than 300.  - Lancets (ONETOUCH DELICA PLUS [id number]) Does not apply Misc; 1 Lancet daily. Pt to  check glucose by finger stick daily  Dispense: 100 each; Refill: 2    The patient indicates understanding of these issues and agrees to the plan.  The patient is asked to call with concerns.    *Please note that the following visit was completed using two-way, real-time interactive audio and/or video communication.  This has been done in good sonido to provide continuity of care in the best interest of the provider-patient relationship, due to the ongoing public health crisis/national emergency and because of restrictions of visitation.  There are limitations of this visit as physical exam could not be fully performed.  Every conscious effort was taken to allow for sufficient and adequate time.  Included in this visit, time may have been spent reviewing labs, medications, radiology tests and decision-making.  Appropriate medical decision-making and tests are ordered as detailed in the plan of care above.  Coding/billing information is submitted for this visit based on complexity of care and/or time spent for the visit.      Start Time: 9:45 am  End Time: 10:08 am    Lynne CHATTERJEE         [1]   Allergies  Allergen Reactions    Seasonal Runny nose   [2]   Current Outpatient Medications   Medication Sig Dispense Refill    Tirzepatide (MOUNJARO) 2.5 MG/0.5ML Subcutaneous Solution Auto-injector Inject 2.5 mg into the skin once a week. (Patient not taking: Reported on 6/24/2025)      PREDNISONE 5 MG Oral Tab TAKE 4 TABLETS (20 MG TOTAL) BY MOUTH DAILY FOR 3 DAYS, THEN 3 TABLETS (15 MG TOTAL) DAILY FOR 3 DAYS, THEN 2 TABLETS (10 MG TOTAL) DAILY FOR 3 DAYS, THEN 1 TABLET (5 MG TOTAL) DAILY FOR 3 DAYS, THEN 1 TABLET (5 MG TOTAL) DAILY AS NEEDED. TAKE AS INSTRUCTED IN THE MORNING WITH BREAKFAST FOR 12 DAYS. THEN, AFTER 12 DAYS, ONLY TAKE PREDNISONE UP TO 1 TABLET DAILY AS NEEDED FOR BREAKTHROUGH PAIN. 60 tablet 0    predniSONE 10 MG Oral Tab Day 1-2 four tabs, day 3-4 three tabs, day 5-6 two tabs,day 7-8 one tab (Patient  taking differently: as needed. Day 1-2 four tabs, day 3-4 three tabs, day 5-6 two tabs,day 7-8 one tab) 20 tablet 0    allopurinol 100 MG Oral Tab Take 1.5 tablets (150 mg total) by mouth daily. (Patient not taking: Reported on 6/24/2025) 90 tablet 2    predniSONE 5 MG Oral Tab Take 1 tablet (5 mg total) by mouth After Breakfast. (Patient taking differently: Take 1 tablet (5 mg total) by mouth daily.) 90 tablet 2    triamcinolone 0.1 % External Cream Apply 1 Application topically 3 (three) times daily. 45 g 0    amLODIPine 5 MG Oral Tab Take 1 tablet (5 mg total) by mouth nightly. 90 tablet 0    carvedilol 25 MG Oral Tab Take 1 tablet (25 mg total) by mouth 2 (two) times daily with meals. 180 tablet 0    Irbesartan 150 MG Oral Tab Take 1 tablet (150 mg total) by mouth nightly. 90 tablet 0    Meloxicam 15 MG Oral Tab Take 1 tablet (15 mg total) by mouth daily as needed.      FREESTYLE LITE TEST In Vitro Strip TEST DAILY AS DIRECTED 100 strip 0    Blood Glucose Monitoring Suppl (FREESTYLE LITE) Does not apply Device   0    Multiple Vitamin (MULTIVITAMINS) Oral Tab Take 1 tablet by mouth daily. (Patient not taking: Reported on 6/24/2025)     [3]   Past Medical History:   Back problem    Biliary calculus    CKD (chronic kidney disease)    Diabetes (HCC)    Diabetes mellitus (HCC)    Essential hypertension    Gout    High blood pressure    History of gallstones    History of morbid obesity    Lumbar stenosis with neurogenic claudication    Osteoarthritis    Renal disorder   [4]   Past Surgical History:  Procedure Laterality Date    Excis lumbar disk,one level      Hip replacement surgery  2014    both    Other surgical history  2014    bilateral hip replacement    Total hip replacement     [5]   Social History  Socioeconomic History    Marital status: Single   Tobacco Use    Smoking status: Never    Smokeless tobacco: Never   Vaping Use    Vaping status: Never Used   Substance and Sexual Activity    Alcohol use: No     Drug use: No   Other Topics Concern    Caffeine Concern Yes     Comment: 1 cup of coffee daily and 1-2 cans of Pepsi daily.     Exercise Yes     Comment: 1-2 days/week    Pt has a pacemaker No    Reaction to local anesthetic No   Social History Narrative    Live alone    UA ramp reza    6 grandchildren        No tob - LLNS    No etoh        No pets     Social Drivers of Health      Received from [facility]    Housing Stability   [6]   Family History  Adopted: Yes   Problem Relation Age of Onset    Heart Attack Father     Heart Attack Mother

## 2025-06-30 ENCOUNTER — HOSPITAL ENCOUNTER (OUTPATIENT)
Dept: GENERAL RADIOLOGY | Age: 70
Discharge: HOME | End: 2025-06-30
Attending: FAMILY MEDICINE
Payer: COMMERCIAL

## 2025-06-30 DIAGNOSIS — M25.572 ACUTE LEFT ANKLE PAIN: ICD-10-CM

## 2025-06-30 PROCEDURE — 73610 X-RAY EXAM OF ANKLE: CPT | Performed by: FAMILY MEDICINE

## 2025-07-03 ENCOUNTER — TELEMEDICINE (OUTPATIENT)
Dept: INTERNAL MEDICINE CLINIC | Facility: CLINIC | Age: 70
End: 2025-07-03
Payer: COMMERCIAL

## 2025-07-03 DIAGNOSIS — M10.9 GOUT OF ANKLE, UNSPECIFIED CAUSE, UNSPECIFIED CHRONICITY, UNSPECIFIED LATERALITY: ICD-10-CM

## 2025-07-03 DIAGNOSIS — M72.2 PLANTAR FASCIITIS OF LEFT FOOT: Primary | ICD-10-CM

## 2025-07-03 DIAGNOSIS — M76.62 ACHILLES TENDINITIS OF LEFT LOWER EXTREMITY: ICD-10-CM

## 2025-07-03 NOTE — PROGRESS NOTES
Virtual Video Check-In     This visit is conducted using Telemedicine with live, interactive video and audio.    Mustapha Meyers, who has verified his/her identification by name and , verbally consents to a Virtual/Telephone Check-In visit on 25.  Patient confirms that he/she is in the [facility] at the time of service.  Patient understands and accepts financial responsibility for any deductible, co-insurance and/or co-pays associated with this service.    TIME: 25 minutes      HPI: Patient did have the ankle xray done in [place] on Monday. No result yet. Patient did go see another podiatrist Dr. Wenceslao Cee in [place] for a 2nd opinion. This doctor told him he has plantar fasciitis and achilles tendonitis.  This doctor also felt his gout had flared up. He had a uric acid level done in his office but the results are not back yet.Pt was given a steroid injection and that has helped with the pain. Pt has minimal pain now and the swelling is almost gone.Pt plans on returning to work tomorrow but will need a form filled out. Advised Pt he will need to come in and see me since I am not the one who has been taking care of this issue,other doctors have. Pt will make an appt for Monday.    ROS:  GENERAL:  Denies fever, chills,weight change, fatigue  SKIN: Denies rashes, skin lesions  EYES: Denies blurred vision or double vision  HENT: Denies congestion,sore throat or ear pain.   CHEST: Denies chest pain, or palpitations  LUNGS: Denies shortness of breath,wheezing or cough  GI: Denies abdominal pain, N/V/C/D.   MUSCULOSKELETAL: Denies any arthralgia,myalgias or swollen joints  LYMPH:  Denies lymphadenopathy  NEURO:  Denies headaches and lightheadedness.   PSYCH: denies depression or anxiety    ALLERGIES:  Allergies[1]    Current Medications[2]    Past Medical History[3]   Past Surgical History[4]   Short Social Hx on File[5]    Family History[6]     PE:  No vital signs were taken for this video  visit.  GENERAL: well developed, well nourished, in no acute distress  SKIN: no rashes on visible parts of skin  HEENT: normocephalic, atraumatic, conjunctiva clear  LUNGS: regular breathing rate and effort with no audible respiratory distress  NEURO: A&Ox3  PSYCH: pleasant mood and affect, appropriate judgement and insight    ASSESSMENT/PLAN:    Encounter Diagnoses   Name Primary?    Plantar fasciitis of left foot Yes    Achilles tendinitis of left lower extremity     Gout of ankle, unspecified cause, unspecified chronicity, unspecified laterality        No orders of the defined types were placed in this encounter.      Meds & Refills for this Visit:  Requested Prescriptions      No prescriptions requested or ordered in this encounter       Imaging & Consults:  None    Pt finished the steroids from the rheumatologist but is continuing the Allopurinol. Advised Pt he may take aleve or ibuprofen if needed for pain.  Pt to f/u with Dr. Cee as scheduled.  The patient indicates understanding of these issues and agrees to the plan.  The patient is asked to follow up Monday for an office visit to have forms complete.    *Please note that the following visit was completed using two-way, real-time interactive audio and/or video communication.  This has been done in good sonido to provide continuity of care in the best interest of the provider-patient relationship, due to the ongoing public health crisis/national emergency and because of restrictions of visitation.  There are limitations of this visit as physical exam could not be fully performed.  Every conscious effort was taken to allow for sufficient and adequate time.  Included in this visit, time may have been spent reviewing labs, medications, radiology tests and decision-making.  Appropriate medical decision-making and tests are ordered as detailed in the plan of care above.  Coding/billing information is submitted for this visit based on complexity of care  and/or time spent for the visit.      Start Time: 11:30 am  End Time: 11:55 am    Lynne CHATTERJEE         [1]   Allergies  Allergen Reactions    Seasonal Runny nose   [2]   Current Outpatient Medications   Medication Sig Dispense Refill    amLODIPine 5 MG Oral Tab Take 1 tablet (5 mg total) by mouth nightly. 90 tablet 0    Irbesartan 150 MG Oral Tab Take 1 tablet (150 mg total) by mouth nightly. 90 tablet 0    Lancets (ONETOUCH DELICA PLUS [id number]) Does not apply Misc 1 Lancet daily. Pt to check glucose by finger stick daily 100 each 2    Tirzepatide (MOUNJARO) 2.5 MG/0.5ML Subcutaneous Solution Auto-injector Inject 2.5 mg into the skin once a week. (Patient not taking: Reported on 6/24/2025)      PREDNISONE 5 MG Oral Tab TAKE 4 TABLETS (20 MG TOTAL) BY MOUTH DAILY FOR 3 DAYS, THEN 3 TABLETS (15 MG TOTAL) DAILY FOR 3 DAYS, THEN 2 TABLETS (10 MG TOTAL) DAILY FOR 3 DAYS, THEN 1 TABLET (5 MG TOTAL) DAILY FOR 3 DAYS, THEN 1 TABLET (5 MG TOTAL) DAILY AS NEEDED. TAKE AS INSTRUCTED IN THE MORNING WITH BREAKFAST FOR 12 DAYS. THEN, AFTER 12 DAYS, ONLY TAKE PREDNISONE UP TO 1 TABLET DAILY AS NEEDED FOR BREAKTHROUGH PAIN. 60 tablet 0    predniSONE 10 MG Oral Tab Day 1-2 four tabs, day 3-4 three tabs, day 5-6 two tabs,day 7-8 one tab (Patient taking differently: as needed. Day 1-2 four tabs, day 3-4 three tabs, day 5-6 two tabs,day 7-8 one tab) 20 tablet 0    allopurinol 100 MG Oral Tab Take 1.5 tablets (150 mg total) by mouth daily. (Patient not taking: Reported on 6/24/2025) 90 tablet 2    predniSONE 5 MG Oral Tab Take 1 tablet (5 mg total) by mouth After Breakfast. (Patient taking differently: Take 1 tablet (5 mg total) by mouth daily.) 90 tablet 2    triamcinolone 0.1 % External Cream Apply 1 Application topically 3 (three) times daily. 45 g 0    carvedilol 25 MG Oral Tab Take 1 tablet (25 mg total) by mouth 2 (two) times daily with meals. 180 tablet 0    Meloxicam 15 MG Oral Tab Take 1 tablet (15 mg total) by mouth  daily as needed.      FREESTYLE LITE TEST In Vitro Strip TEST DAILY AS DIRECTED 100 strip 0    Blood Glucose Monitoring Suppl (FREESTYLE LITE) Does not apply Device   0    Multiple Vitamin (MULTIVITAMINS) Oral Tab Take 1 tablet by mouth daily. (Patient not taking: Reported on 6/24/2025)     [3]   Past Medical History:   Back problem    Biliary calculus    CKD (chronic kidney disease)    Diabetes (HCC)    Diabetes mellitus (HCC)    Essential hypertension    Gout    High blood pressure    History of gallstones    History of morbid obesity    Lumbar stenosis with neurogenic claudication    Osteoarthritis    Renal disorder   [4]   Past Surgical History:  Procedure Laterality Date    Excis lumbar disk,one level      Hip replacement surgery  2014    both    Other surgical history  2014    bilateral hip replacement    Total hip replacement     [5]   Social History  Socioeconomic History    Marital status: Single   Tobacco Use    Smoking status: Never    Smokeless tobacco: Never   Vaping Use    Vaping status: Never Used   Substance and Sexual Activity    Alcohol use: No    Drug use: No   Other Topics Concern    Caffeine Concern Yes     Comment: 1 cup of coffee daily and 1-2 cans of Pepsi daily.     Exercise Yes     Comment: 1-2 days/week    Pt has a pacemaker No    Reaction to local anesthetic No   Social History Narrative    Live alone    UA ramp reza    6 grandchildren        No tob - LLNS    No etoh        No pets     Social Drivers of Health      Received from [facility]    Housing Stability   [6]   Family History  Adopted: Yes   Problem Relation Age of Onset    Heart Attack Father     Heart Attack Mother

## 2025-07-07 ENCOUNTER — OFFICE VISIT (OUTPATIENT)
Dept: INTERNAL MEDICINE CLINIC | Facility: CLINIC | Age: 70
End: 2025-07-07
Payer: COMMERCIAL

## 2025-07-07 VITALS
HEIGHT: 67 IN | WEIGHT: 289.38 LBS | RESPIRATION RATE: 18 BRPM | SYSTOLIC BLOOD PRESSURE: 138 MMHG | HEART RATE: 74 BPM | BODY MASS INDEX: 45.42 KG/M2 | DIASTOLIC BLOOD PRESSURE: 82 MMHG | OXYGEN SATURATION: 96 %

## 2025-07-07 DIAGNOSIS — M10.9 GOUT OF ANKLE, UNSPECIFIED CAUSE, UNSPECIFIED CHRONICITY, UNSPECIFIED LATERALITY: ICD-10-CM

## 2025-07-07 DIAGNOSIS — M76.62 ACHILLES TENDINITIS OF LEFT LOWER EXTREMITY: Primary | ICD-10-CM

## 2025-07-07 DIAGNOSIS — M72.2 PLANTAR FASCIITIS OF LEFT FOOT: ICD-10-CM

## 2025-07-07 PROCEDURE — 99213 OFFICE O/P EST LOW 20 MIN: CPT | Performed by: FAMILY MEDICINE

## 2025-07-07 NOTE — PROGRESS NOTES
CHIEF COMPLAINT:     Chief Complaint   Patient presents with    Follow - Up     L foot swelling has gone down. Shows has some inflammation in achilles.        HPI:   Mustapha Meyers is a 70 year old male .  Patient is following up on his left achilles tendonitis/plantar fasciitis.   Pt states the swelling is down in the ankle but it is still a little sore. Pt is limping on the left.  Pt also needs a form filled out for his job since he was off from 6/23/25 to 7/3/25.  Pt has a follow up appointment with the podiatrist on 7/10/25.   Current Medications[1]   Past Medical History[2]   Social History:  Short Social Hx on File[3]     REVIEW OF SYSTEMS:   GENERAL: Denies fever, chills,weight change, decreased appetite  SKIN: Denies rashes, skin wounds or ulcers.  EYES: Denies blurred vision or double vision  HENT: Denies congestion, rhinorrhea, sore throat or ear pain  CHEST: Denies chest pain, or palpitations  LUNGS: Denies shortness of breath, cough, or wheezing  GI: Denies abdominal pain, N/V/C/D.   MUSCULOSKELETAL: see HPI  LYMPH:  Denies lymphadenopathy  NEURO: Denies headaches or lightheadedness      EXAM:   /82 (BP Location: Left arm, Patient Position: Sitting, Cuff Size: large)   Pulse 74   Resp 18   Ht 5' 7\" (1.702 m)   Wt 289 lb 6.4 oz (131.3 kg)   SpO2 96%   BMI 45.33 kg/m²   GENERAL: well developed, well nourished,in no apparent distress  SKIN: no rashes,no suspicious lesions  HEAD: atraumatic, normocephalic  EYES: conjunctiva clear  NECK: supple, non-tender  LUNGS: clear to auscultation bilaterally, no wheezes or rhonchi. Breathing is non labored.  CARDIO: RRR without murmur  EXTREMITIES: no cyanosis, clubbing or +edema left lower leg  MUSC: Left ankle swollen, no discoloration, no tenderness,FROM. Pedal pulse present. Gait limping on the left    Physical Exam    ASSESSMENT AND PLAN:     Encounter Diagnoses   Name Primary?    Achilles tendinitis of left lower extremity Yes    Plantar fasciitis  of left foot     Gout of ankle, unspecified cause, unspecified chronicity, unspecified laterality        No orders of the defined types were placed in this encounter.      Meds & Refills for this Visit:  Requested Prescriptions      No prescriptions requested or ordered in this encounter       Imaging & Consults:  None    PLAN:  Pt to f/u with the podiatrist as scheduled.  Continue tylenol/ibuprofen as needed.  Continue Allopurinol  and f/u with the Rheumatologist as scheduled.  Form completed for his job.  The patient indicates understanding of these issues and agrees to the plan.  The patient is asked to call with any concerns.           [1]   Current Outpatient Medications   Medication Sig Dispense Refill    amLODIPine 5 MG Oral Tab Take 1 tablet (5 mg total) by mouth nightly. 90 tablet 0    Irbesartan 150 MG Oral Tab Take 1 tablet (150 mg total) by mouth nightly. 90 tablet 0    Lancets (ONETOUCH DELICA PLUS [id number]) Does not apply Misc 1 Lancet daily. Pt to check glucose by finger stick daily 100 each 2    allopurinol 100 MG Oral Tab Take 1.5 tablets (150 mg total) by mouth daily. 90 tablet 2    predniSONE 5 MG Oral Tab Take 1 tablet (5 mg total) by mouth After Breakfast. 90 tablet 2    triamcinolone 0.1 % External Cream Apply 1 Application topically 3 (three) times daily. 45 g 0    carvedilol 25 MG Oral Tab Take 1 tablet (25 mg total) by mouth 2 (two) times daily with meals. (Patient taking differently: Take 1 tablet (25 mg total) by mouth daily.) 180 tablet 0    Meloxicam 15 MG Oral Tab Take 1 tablet (15 mg total) by mouth daily as needed.      FREESTYLE LITE TEST In Vitro Strip TEST DAILY AS DIRECTED 100 strip 0    Blood Glucose Monitoring Suppl (FREESTYLE LITE) Does not apply Device   0    Tirzepatide (MOUNJARO) 2.5 MG/0.5ML Subcutaneous Solution Auto-injector Inject 2.5 mg into the skin once a week. (Patient not taking: Reported on 7/7/2025)      Multiple Vitamin (MULTIVITAMINS) Oral Tab Take 1 tablet by  mouth daily. (Patient not taking: Reported on 7/7/2025)     [2]   Past Medical History:   Back problem    Biliary calculus    CKD (chronic kidney disease)    Diabetes (HCC)    Diabetes mellitus (HCC)    Essential hypertension    Gout    High blood pressure    History of gallstones    History of morbid obesity    Lumbar stenosis with neurogenic claudication    Osteoarthritis    Renal disorder   [3]   Social History  Socioeconomic History    Marital status: Single   Tobacco Use    Smoking status: Never    Smokeless tobacco: Never   Vaping Use    Vaping status: Never Used   Substance and Sexual Activity    Alcohol use: No    Drug use: No   Other Topics Concern    Caffeine Concern Yes     Comment: 1 cup of coffee daily and 1-2 cans of Pepsi daily.     Exercise Yes     Comment: 1-2 days/week    Pt has a pacemaker No    Reaction to local anesthetic No   Social History Narrative    Live alone    UA ramp reza    6 grandchildren        No tob - LLNS    No etoh        No pets     Social Drivers of Health      Received from [facility]    Housing Stability

## 2025-07-11 RX ORDER — PREDNISONE 5 MG/1
TABLET ORAL
Qty: 60 TABLET | Refills: 0 | OUTPATIENT
Start: 2025-07-11

## 2025-07-23 ENCOUNTER — TELEPHONE (OUTPATIENT)
Dept: INTERNAL MEDICINE CLINIC | Facility: CLINIC | Age: 70
End: 2025-07-23

## 2025-07-23 LAB — URIC ACID: 7.6

## 2025-07-27 DIAGNOSIS — M10.9 GOUTY ARTHROPATHY: ICD-10-CM

## 2025-07-29 RX ORDER — COLCHICINE 0.6 MG/1
0.6 TABLET ORAL DAILY
Qty: 90 TABLET | Refills: 0 | OUTPATIENT
Start: 2025-07-29

## 2025-08-05 ENCOUNTER — PATIENT MESSAGE (OUTPATIENT)
Dept: INTERNAL MEDICINE CLINIC | Facility: CLINIC | Age: 70
End: 2025-08-05

## 2025-08-12 ENCOUNTER — TELEMEDICINE (OUTPATIENT)
Dept: INTERNAL MEDICINE CLINIC | Facility: CLINIC | Age: 70
End: 2025-08-12

## 2025-08-12 DIAGNOSIS — M25.571 ACUTE RIGHT ANKLE PAIN: ICD-10-CM

## 2025-08-12 DIAGNOSIS — E11.9 DIABETES MELLITUS WITHOUT COMPLICATION (HCC): ICD-10-CM

## 2025-08-12 DIAGNOSIS — K92.1 BLACK STOOL: Primary | ICD-10-CM

## 2025-08-12 DIAGNOSIS — M10.9 GOUTY ARTHROPATHY: ICD-10-CM

## 2025-08-12 DIAGNOSIS — E78.5 HYPERLIPIDEMIA, UNSPECIFIED HYPERLIPIDEMIA TYPE: ICD-10-CM

## 2025-08-12 PROCEDURE — 98004 SYNCH AUDIO-VIDEO EST SF 10: CPT | Performed by: FAMILY MEDICINE

## (undated) DIAGNOSIS — I10 ESSENTIAL HYPERTENSION: ICD-10-CM

## (undated) DIAGNOSIS — M48.062 SPINAL STENOSIS OF LUMBAR REGION WITH NEUROGENIC CLAUDICATION: Primary | ICD-10-CM

## (undated) DEVICE — BANDAID COVERLET 1X3

## (undated) DEVICE — BANDAGE ADH COVERLET 3X1IN

## (undated) DEVICE — GLOVE SURG SENSICARE SZ 6-1/2

## (undated) DEVICE — GLOVE SURG SENSICARE SZ 7

## (undated) DEVICE — Device

## (undated) DEVICE — REMOVER DURAPREP 3M

## (undated) DEVICE — MARKER SURGICAL DUAL TIP

## (undated) DEVICE — GLOVE SURG SENSICARE SZ 7-1/2

## (undated) DEVICE — REMOVER PREP SOLUTION 4OZ

## (undated) DEVICE — SKIN MARKER DUAL TIP WITH RULER CAP AND LABELS: Brand: DEVON

## (undated) DEVICE — AVANOS* TUOHY EPIDURAL NEEDLE: Brand: AVANOS

## (undated) DEVICE — PAIN TRAY: Brand: MEDLINE INDUSTRIES, INC.

## (undated) DEVICE — MARKER SKIN 2 TIP

## (undated) NOTE — LETTER
[place] [address] [facility] [address]  [phone]  Authorization for Imaging Procedure  Date of Procedure:     1. I hereby authorize Dr. Dominic Lockett, my physician and his/her assistants (if applicable), which may include medical students, residents, and/or fellows, to perform the following procedure and administer such anesthesia as may be determined necessary by my physician: XR MYELOGRAM LUMBAR WITH INJECTION  (QST=05186) on Florecita Mancusoer. 2.  I recognize that during the procedure, unforeseen conditions may necessitate additional or different procedures than those listed above. I, therefore, further authorize and request that the above-named physician, assistants, or designees perform such procedures as are, in their judgment, necessary and desirable. 3.  My physician has discussed prior to my procedure the potential benefits, risks and side effects of this procedure; the likelihood of achieving goals; and potential problems that might occur during recuperation. They also discussed reasonable alternatives to the procedure, including risks, benefits, and side effects related to the alternatives and risks related to not receiving this procedure. I have had all my questions answered and I acknowledge that no guarantee has been made as to the result that may be obtained. 4.  Should the need arise during my procedure, which includes change of level of care prior to discharge, I also consent to the administration of blood and/or blood products. Further, I understand that despite careful testing and screening of blood or blood products by collecting agencies, I may still be subject to ill effects as a result of receiving a blood transfusion and/or blood products.  The following are some, but not all, of the potential risks that can occur: fever and allergic reactions, hemolytic reactions, transmission of diseases such as Hepatitis, AIDS and Cytomegalovirus (CMV) and fluid overload. In the event that I wish to have an autologous transfusion of my own blood, or a directed donor transfusion, I will discuss this with my physician. Check only if Refusing Blood or Blood Products  I understand refusal of blood or blood products as deemed necessary by my physician may have serious consequences to my condition to include possible death. I hereby assume responsibility for my refusal and release the hospital, its personnel, and my physicians from any responsibility for the consequences of my refusal.   [  ] Patient Refuses Blood      5. I authorize the use of any specimen, organs, tissues, body parts or foreign objects that may be removed from my body during the procedure for diagnosis, research or teaching purposes and their subsequent disposal by hospital authorities. I also authorize the release of specimen test results and/or written reports to my treating physician on the hospital medical staff or other referring or consulting physicians involved in my care, at the discretion of the Pathologist or my treating physician. 6.  I consent to the photographing or videotaping of the procedures to be performed, including appropriate portions of my body for medical, scientific, or educational purposes, provided my identity is not revealed by the pictures or by descriptive texts accompanying them. If the procedure has been photographed/videotaped, the physician will obtain the original picture, image, videotape or CD. The hospital will not be responsible for storage, release or maintenance of the picture, image, tape or CD.   7.  I consent to the presence of a  or observers in the operating room as deemed necessary by my physician or their designees. 8.  I recognize that in the event my procedure results in extended X-Ray/fluoroscopy time, I may develop a skin reaction. 9.   If I have a Do Not Attempt Resuscitation (DNAR) order in place, that status will be suspended while in the operating room, procedural suite, and during the recovery period unless otherwise explicitly stated by me (or a person authorized to consent on my behalf). The performing physician or my attending physician will determine when the applicable recovery period ends for purposes of reinstating the DNAR order. 10.  I acknowledge that my physician has explained sedation/analgesia administration to me including the risk and benefits I consent to the administration of sedation/analgesia as may be necessary or desirable in the judgment of my physician. I CERTIFY THAT I HAVE READ AND FULLY UNDERSTAND THE ABOVE CONSENT FOR THE PROCEDURE.    Signature of Patient: _____________________________________________________________  Responsible person in case of minor, unconscious: ____________________________________  Relationship to patient:  __________________________________________________________    Signature of Witness: _______________________________Date: _________Time: __________    Patient Name: Radha Turpin : 3/18/1955  Printed: 2022   Medical Record #: [id number]

## (undated) NOTE — LETTER
22          Keyurruthie Leal  :  3/18/1955      To Whom It May Concern: This patient was seen in our office on 22 . Work status:  Remain off work, temporary total disability. Patient will be re evaluated on . May return to work status per above effective 22. If this office may be of further assistance, please do not hesitate to contact us.       Sincerely,        Cruz Angelo MD

## (undated) NOTE — LETTER
03/24/22        [place]      Dear Jim Garcia,    [facility] indicate that you have outstanding lab work and or testing that was ordered for you and has not yet been completed:  Orders Placed This Encounter      CBC With Differential With Platelet      Comp Metabolic Panel (14)      Hemoglobin A1C      Lipid Panel      Microalb/Creat Ratio, Random Urine      TSH W Reflex To Free T4      Vitamin D      PSA Screen      Urinalysis, Routine    To provide you with the best possible care, please complete these orders at your earliest convenience. If you have recently completed these orders please disregard this letter. If you have any questions please call the office at Dept: [phone].      Thank you,       SHENA Romero

## (undated) NOTE — LETTER
07/10/20        [address]      Dear Bere Pruett,    [facility] records indicate that you have outstanding lab work and or testing that was ordered for you and has not yet been completed: Fasting lab work - Omar Serrano

## (undated) NOTE — LETTER
Date: 6/2/2022    Patient Name: Modesto Martinez          To Whom it may concern: This letter has been written at the patient's request. The above patient was seen at the [facility] for treatment of a medical condition- Spinal Stenosis. Patient is to have additional treatment and will be reevaluated. The patient may be able to return to work on 6/30/22 depending on how the treatments work out for the patient.         Sincerely,          SHENA Peterosn

## (undated) NOTE — LETTER
Date: 2/21/2022    Patient Name: Caryl Self          To Whom it may concern: This letter has been written at the patient's request. The above patient was seen at the [facility] for treatment of a medical condition-Sciatica    This patient should be excused from attending work from 2/21/22 through 3/6/22. The patient may return to work on 3/7/22 with the following limitations none.         Sincerely,          SHENA Mahoney

## (undated) NOTE — LETTER
Date & Time: 6/23/2025, 9:06 AM  Patient: Mustapha Meyers  Encounter Provider(s):    Esperanza Walden PA-C       To Whom It May Concern:    Mustapha Meyers was seen and treated in our department on 6/23/2025. He should not return to work until 6/26/2025.    If you have any questions or concerns, please do not hesitate to call.        _____________________________  Physician/APC Signature

## (undated) NOTE — LETTER
12/17/18        [address]      Dear Harpal Baker,    [facility] records indicate that you have outstanding lab work and or testing that was ordered for you and has not yet been completed:  Orders Placed This En

## (undated) NOTE — LETTER
4/3/2018              [address] [place] [address] [address] Argelia Baum             Dear Luke Danielson,    This letter is to inform you that our office has made several attempts to reach you by phone without success.   We were attemptin

## (undated) NOTE — LETTER
AUTHORIZATION FOR SURGICAL OPERATION OR OTHER PROCEDURE    1.  I hereby [facility]. Parmjit Avina, [facility] assigned to my case to perform the following operation and/or procedure at the [facility]:    _________________Cortisone Injec Patient Name:  ______________________________________________________  (please print)      Patient signature:  ___________________________________________________             Relationship to Patient:           []  Parent    Responsible person

## (undated) NOTE — LETTER
Date: 5/31/2022    Patient Name: Freddie Jimenez          To Whom it may concern: This letter has been written at the patient's request. The above patient was seen at the [facility] for treatment of a medical condition. It is my recommendation that this patient return to work, allowing for modification to the work description. This patient should be allowed to utilize an assistive device, such as a cane, and should be allowed breaks as needed if pain is too severe. If these modifications are not feasible, I would respectfully request documentation of what would be required to allow patient to return to work. Additionally, if this is not feasible, patient is expected to have an additional treatment through our clinic and then be evaluated by our colleagues in early July if symptoms are persistent. There is a potential that patient would be able to return to work around June 30, 2022, but unsure of restrictions at this time. If I may be of further assistance, please do not hesitate to contact our office.          Sincerely,          Melva CHATTERJEE-FPA, FNP-BC, CARN-AP  Pain Management [address]  . [address] [place] [address]  Phone: [phone]  Fax: [phone]

## (undated) NOTE — ED AVS SNAPSHOT
Mr. Cristobal Samson   MRN: [id number]    Department:  [facility] Emergency Department   Date of Visit:  2/8/2019           Disclosure     Insurance plans vary and the physician(s) referred by the ER may not be covered by your plan.  Please con CARE PHYSICIAN AT ONCE OR RETURN IMMEDIATELY TO THE EMERGENCY DEPARTMENT. If you have been prescribed any medication(s), please fill your prescription right away and begin taking the medication(s) as directed.   If you believe that any of the medications

## (undated) NOTE — MR AVS SNAPSHOT
[place]  [address] [facility] [address]  [address] [facility] [address] [phone]               Thank you for choosing us for your health care visit with Lisandro Lara NP.   We are glad to serve you and happy to provide you with this sum 1 each by Other route daily. Use as directed. Commonly known as:  ONETOUCH ULTRA BLUE           MetFORMIN HCl 500 MG Tabs   Take 1 tablet (500 mg total) by mouth 2 (two) times daily.    Commonly known as:  GLUCOPHAGE           multivitamin Tabs   Take 1 T Make half your plate fruits and vegetables Highly refined, white starches including white bread, rice and pasta   Eat plenty of protein, keep the fat content low Sugars:  sodas and sports drinks, candies and desserts   Eat plenty of low-fat dairy products

## (undated) NOTE — LETTER
04/15/22      To Whom It May Concern:    Brooke Medina was seen in our office on 4/15/2022. Work status is as follows: Off work until re-evaluated at next appointment in 1 month. If this office may be of further assistance, please do not hesitate to contact us.     Sincerely,        Neal Tovar PA-C

## (undated) NOTE — LETTER
Date: 3/25/2025    Patient Name: Mustapha Meyers          To Whom it may concern:    The above patient was seen at [facility] for treatment of a medical condition.    This patient should be excused from attending work from 3/27/25 through 3/30/25.    The patient may return to work on 3/31/25 with the following limitations none.        Sincerely,            SHENA Agarwal

## (undated) NOTE — LETTER
Date: 5/12/2025    Patient Name: Mustapha Meyers          To Whom it may concern:    The above patient was seen at [facility] for treatment of a medical condition- Sciatica Back Pain.    This patient should be excused from attending work today 5/12/25    The patient may return to work on 5/16/25 with the following limitations none.        Sincerely,          SHENA Agarwal

## (undated) NOTE — LETTER
[facility], [place]  Authorization for Invasive Procedures  1. I hereby authorize Dr. Jesica Pitts , my physician and whomever may be designated as the doctor's assistant, to perform the following operation and/or procedure:  Lumbar myelogram on Brooke Medina at [facility].    2. My physician has explained to me the nature and purpose of the operation or other procedure, possible alternative methods of treatment, the risks involved and the possibility of complications to me. I understand the probable consequences of declining the recommended procedure and the alternative methods of treatment. I acknowledge that no guarantee has been made as to the result that may be obtained. 3. I recognize that during the course of this operation or other procedure, unforeseen conditions may necessitate additional or different procedures than those listed above. I, therefore, further authorize and request that the above-named physician, his/her physician assistants, or designees perform such procedures as are, in his/her professional opinion, necessary and desirable. If I have a Do Not Attempt Resuscitation (DNAR) order in place, that status will be suspended while in the operating room, procedural suite, and during the recovery period unless otherwise explicitly stated by me (or a person authorized to consent on my behalf). The surgeon or my attending physician will determine when the applicable recovery period ends for purposes of reinstating the DNAR order. 4. Should the need arise during my operation or immediate post-operative period; I also consent to the administration of blood and/or blood products.  Further, I understand that despite careful testing and screening of blood and blood products, I may still be subject to ill effects as a result of recieving a blood transfusion an/or blood producst. The following are some, but not all, of the potential risks that can occur: fever and allergic reactions, hemolytic reactions, transmission of disease such as hepatitis, AIDS, cytomegalovirus (CMV), and flluid overload. In the event that I wish to have autologous transfusions of my own blood, or a directed donor transfusion, I will discuss this with my physician. 5. I consent to the photographing of the operations or procedures to be performed for the purposes of advancing medicine, science, and/or education, provided my identity is not revealed. If the procedure has been videotaped, the physician/surgeon will obtain the original videotape. The hospital will not be responsible for storage or maintenance of this tape. 6. I consent to the presence of a  or observer as deemed necessary by my physician or his designee. 7. Any tissues or organs removed in the operation or other procedure may be disposed of by and at the discretion of [facility].    8. I understand that the physician and his/her physician assistants may not be employees or agents of [facility], [facility], [facility], but are independent medical practitioners who have been permitted to use its facilities for the care and treatment of their patients. 9. Patients having a sterilization procedure: I understand that if the procedure is successful the results will be permanent and it will therefore be impossible for me to inseminate, conceive or bear children. I also understand that the procedure is intended to result in sterility, although the result has not been guaranteed. 10. I CERTIFY THAT I HAVE READ AND FULLY UNDERSTAND THE ABOVE CONSENT TO OPERATION and/or OTHER PROCEDURE. 11. I acknowledge that my physician has explained sedation/analgesia administration to me including the risks and benefits. I consent to the administration of sedation/analgesia as may be necessary or desirable in the judgment of my physician.      Signature of Patient:  ________________________________________________ Date: _________Time: _________    Responsible person in case of minor or unconscious: _____________________________Relationship: ____________     Witness Signature: ____________________________________________ Date: __________ Time: ___________    Statement of Physician  My signature below affirms that prior to the time of the procedure, I have explained to the patient and/or his legal representative, the risks and benefits involved in the proposed treatment and any reasonable alternative to the proposed treatment. I have also explained the risks and benefits involved in the refusal of the proposed treatment and have answered the patient's questions. If I have a significant financial interest in this procedure/surgery, I have disclosed this and had a discussion with my patient.     Signature of Physician:   ________________________________________Date: _________Time:_______ Patient Name: Yogi Mathew  : 3/18/1955   Printed: 2022    Medical Record #: [id number]

## (undated) NOTE — MR AVS SNAPSHOT
[place]  [address] [facility]  [address] [facility] [address] [phone]               Thank you for choosing us for your health care visit with Truong Martinez NP.   We are glad to serve you and happy to provide you with this sum Referral Orders      Normal Orders This Visit    DERM - INTERNAL [[id number] CUSTOM]  Order #:  [id number]         **REFERRAL REQUEST**    Your physician has referred you to a specialist.  Your physician or the clinic staff will provide you with the phone num Routine general medical examination at a health care facility    -  Primary    Laboratory examination ordered as part of a routine general medical examination        Need for prophylactic vaccination with combined diphtheria-tetanus-pertussis (DTP) vaccin Creatinine 1.45 0.70-1.30 mg/dL    GFR 60 >=60      Estimated GFR units: mL/min/1.73 square meters   eGFR calculated by the CKD-EPI equation. Calcium, Total 8.9 8.3-10.3 mg/dL      Total Calciums are not corrected for effects of low albumin.  If nee Component Value Standard Range & Units    PSA 0.494 0.010-4.000 ng/mL      INTERPRETIVE INFORMATION: TOTAL PROSTATE SPECIFIC ANTIGEN:   [employer] used since July 11, 2013.  [employer] PSA reagent used prior to July 11, Immunizations Administered in the Office Today     TDAP       MyChart     Visit [employer]inghart  You can access your MyChart to more actively manage your health care and view more details from this visit by going to [email or web address].   If you've recentl

## (undated) NOTE — LETTER
01/20/20        [address]      Dear Peter Kidney,    [facility] records indicate that you have outstanding lab work and or testing that was ordered for you and has not yet been completed: Fasting lab work   *fa

## (undated) NOTE — LETTER
04/30/21        [address]      Dear Delgado Blackwell,    [address] records indicate that you have outstanding lab work and or testing that was ordered for you and has not yet been completed:  Orders Placed This En

## (undated) NOTE — LETTER
Date: 7/8/2024    Patient Name: Mustapha Meyers          To Whom it may concern:    The above patient was seen at [facility] for treatment of a medical condition- lower back pain.    This patient should be excused from attending work/school from 7/9/24 through 7/14/24.    The patient may return to work/school on 7/15/14 with the following limitations none.        Sincerely,        SHENA Agarwal

## (undated) NOTE — LETTER
Date: 5/9/2022    Patient Name: Светлана Aguayo          To Whom it may concern: This letter has been written at the patient's request. The above patient was seen at the [facility] for treatment of a medical condition. This patient should be excused from attending work/school from 5/9/2022 through 5/30/2022. This amount of time is expected to be necessary in order to obtain insurance authorization for treatment, complete treatment, and then follow up for re-evaluation. The patient must follow up in office for re-evaluation prior to determining an appropriate return to work date and whether or not restrictions will be necessary.         Sincerely,            Toña Mcgee APRN-FPA, FNP-BC, CARN-AP  Pain Management [address] [place] [address]  Phone: [phone]  Fax: [phone]

## (undated) NOTE — LETTER
04/07/20        [address]      Dear Case Mosley,    [facility] records indicate that you have outstanding lab work and or testing that was ordered for you and has not yet been completed:  Orders Placed This En

## (undated) NOTE — LETTER
Date: 9/23/2020    Patient Name: Lizbeth Fox          To Whom it may concern: The above patient was seen at the [facility] for treatment of a medical condition- HTN and elbow pain.     This patient should be excused from attending wo

## (undated) NOTE — LETTER
2/4/2022              [place]ie [place]ila        [address] [facility]          CERTIFIED         Dear Lazarus Amaro records indicate that the tests ordered for you by Gladis Mancia MD  have not been done.   If you have, in

## (undated) NOTE — LETTER
Date: 3/25/2025    Patient Name: Mustapha Meyers          To Whom it may concern:    Mustapha is under my care for a medical condition and will be doing some further testing. Patient is advised not to travel until the testing is completed.    If you have any questions please feel free to call the office.      Sincerely,        SHENA Agarwal

## (undated) NOTE — LETTER
Date & Time: 2/6/2019, 10:55 AM  Patient: Kyle Lopes  Encounter Provider(s):    Elgin Stone MD       To Whom It May Concern:    Roma Serrano was seen and treated in our department on 2/6/2019.  He should not return to work until 2/9/19

## (undated) NOTE — LETTER
Date: 9/12/2024    Patient Name: Mustapha Meyers          To Whom it may concern:    The above patient was seen at [facility] for follow up on his medical conditions. Mustapha has an appointment for lab work on 9/23/24 and on 9/25/24 he is to see the Rheumatologist for his gout and mobility issues. Mustapha has been waiting for over 9 months to see this specialist.    Please excuse the patient from jury duty    If there are any questions please call the office.        Sincerely,        SHENA Agarwal

## (undated) NOTE — LETTER
2/4/2022              [facility]        [address] [facility]                  CERTIFIED         Dear Ángel Knows records indicate that the tests ordered for you by Kyra Strickland MD  have not been done.   If you

## (undated) NOTE — LETTER
20    Patient: Bird Sewell  : 3/18/1955 Visit date: 2020    Dear  Dr. Cl Boyd, SHENA,    Thank you for referring Bird Sewell to my practice. Please find my assessment and plan below.              Assessment   Pneumobilia  (primary en

## (undated) NOTE — LETTER
08/26/19        [address]      Dear Gosia Saab,    [address] [facility] records indicate that you have outstanding lab work and or testing that was ordered for you and has not yet been completed: Fasting lab work   *fa

## (undated) NOTE — LETTER
Date: 6/24/2025    Patient Name: Mustapha Meyers          To Whom it may concern:    This letter has been written at the patient's request. The above patient was seen at [facility] for treatment of a medical condition.    This patient should be excused from attending work from 06/24/25.    The patient may return to work on 06/25/25 with the following limitations: must wear cam orthopaedic boot in place of a normal shoe until further notice and pending MRI results at which time he will be reassessed and a new letter will be provided.        Sincerely,    Dian Phillips DPM

## (undated) NOTE — LETTER
10/28/20        [address]      Dear Eleanor Armas,    [address] [facility] records indicate that you have outstanding lab work and or testing that was ordered for you and has not yet been completed:  Fasting Lab Work   To

## (undated) NOTE — LETTER
01/07/20        [address]      Dear Harpal Baker,    [address] [facility] records indicate that you have outstanding lab work and or testing that was ordered for you and has not yet been completed: Fasting lab work   *fa